# Patient Record
Sex: FEMALE | Race: WHITE | Employment: OTHER | URBAN - METROPOLITAN AREA
[De-identification: names, ages, dates, MRNs, and addresses within clinical notes are randomized per-mention and may not be internally consistent; named-entity substitution may affect disease eponyms.]

---

## 2017-02-06 ENCOUNTER — TELEPHONE (OUTPATIENT)
Dept: PHYSICAL THERAPY | Age: 72
End: 2017-02-06

## 2017-02-21 ENCOUNTER — OFFICE VISIT (OUTPATIENT)
Dept: INTERNAL MEDICINE CLINIC | Age: 72
End: 2017-02-21

## 2017-02-21 VITALS
HEART RATE: 68 BPM | HEIGHT: 62 IN | DIASTOLIC BLOOD PRESSURE: 64 MMHG | BODY MASS INDEX: 25.51 KG/M2 | SYSTOLIC BLOOD PRESSURE: 116 MMHG | WEIGHT: 138.6 LBS | RESPIRATION RATE: 14 BRPM

## 2017-02-21 DIAGNOSIS — E55.9 VITAMIN D DEFICIENCY: ICD-10-CM

## 2017-02-21 DIAGNOSIS — E78.2 MIXED HYPERLIPIDEMIA: ICD-10-CM

## 2017-02-21 DIAGNOSIS — N18.30 CHRONIC KIDNEY DISEASE, STAGE III (MODERATE) (HCC): ICD-10-CM

## 2017-02-21 DIAGNOSIS — R00.2 PALPITATIONS: ICD-10-CM

## 2017-02-21 DIAGNOSIS — M79.7 FIBROMYALGIA: ICD-10-CM

## 2017-02-21 DIAGNOSIS — I10 ESSENTIAL HYPERTENSION: ICD-10-CM

## 2017-02-21 DIAGNOSIS — I89.0 LYMPHEDEMA: ICD-10-CM

## 2017-02-21 DIAGNOSIS — I10 ESSENTIAL HYPERTENSION: Primary | ICD-10-CM

## 2017-02-21 LAB
A/G RATIO: 2.1 (ref 1.1–2.2)
ALBUMIN SERPL-MCNC: 4.4 G/DL (ref 3.4–5)
ALP BLD-CCNC: 86 U/L (ref 40–129)
ALT SERPL-CCNC: 19 U/L (ref 10–40)
ANION GAP SERPL CALCULATED.3IONS-SCNC: 16 MMOL/L (ref 3–16)
AST SERPL-CCNC: 23 U/L (ref 15–37)
BASOPHILS ABSOLUTE: 0.1 K/UL (ref 0–0.2)
BASOPHILS RELATIVE PERCENT: 0.7 %
BILIRUB SERPL-MCNC: 0.4 MG/DL (ref 0–1)
BUN BLDV-MCNC: 20 MG/DL (ref 7–20)
CALCIUM SERPL-MCNC: 9.2 MG/DL (ref 8.3–10.6)
CHLORIDE BLD-SCNC: 97 MMOL/L (ref 99–110)
CHOLESTEROL, TOTAL: 252 MG/DL (ref 0–199)
CO2: 28 MMOL/L (ref 21–32)
CREAT SERPL-MCNC: 1.5 MG/DL (ref 0.6–1.2)
EOSINOPHILS ABSOLUTE: 0.1 K/UL (ref 0–0.6)
EOSINOPHILS RELATIVE PERCENT: 1.1 %
GFR AFRICAN AMERICAN: 41
GFR NON-AFRICAN AMERICAN: 34
GLOBULIN: 2.1 G/DL
GLUCOSE BLD-MCNC: 137 MG/DL (ref 70–99)
HCT VFR BLD CALC: 41.4 % (ref 36–48)
HDLC SERPL-MCNC: 29 MG/DL (ref 40–60)
HEMOGLOBIN: 13.1 G/DL (ref 12–16)
LDL CHOLESTEROL CALCULATED: ABNORMAL MG/DL
LDL CHOLESTEROL DIRECT: 163 MG/DL
LYMPHOCYTES ABSOLUTE: 1.7 K/UL (ref 1–5.1)
LYMPHOCYTES RELATIVE PERCENT: 16.4 %
MCH RBC QN AUTO: 28.9 PG (ref 26–34)
MCHC RBC AUTO-ENTMCNC: 31.6 G/DL (ref 31–36)
MCV RBC AUTO: 91.5 FL (ref 80–100)
MONOCYTES ABSOLUTE: 0.7 K/UL (ref 0–1.3)
MONOCYTES RELATIVE PERCENT: 6.2 %
NEUTROPHILS ABSOLUTE: 8 K/UL (ref 1.7–7.7)
NEUTROPHILS RELATIVE PERCENT: 75.6 %
PDW BLD-RTO: 17.3 % (ref 12.4–15.4)
PLATELET # BLD: 200 K/UL (ref 135–450)
PMV BLD AUTO: 8.7 FL (ref 5–10.5)
POTASSIUM SERPL-SCNC: 4.3 MMOL/L (ref 3.5–5.1)
RBC # BLD: 4.52 M/UL (ref 4–5.2)
SODIUM BLD-SCNC: 141 MMOL/L (ref 136–145)
TOTAL PROTEIN: 6.5 G/DL (ref 6.4–8.2)
TRIGL SERPL-MCNC: 431 MG/DL (ref 0–150)
VLDLC SERPL CALC-MCNC: ABNORMAL MG/DL
WBC # BLD: 10.6 K/UL (ref 4–11)

## 2017-02-21 PROCEDURE — 99214 OFFICE O/P EST MOD 30 MIN: CPT | Performed by: INTERNAL MEDICINE

## 2017-02-21 PROCEDURE — 3288F FALL RISK ASSESSMENT DOCD: CPT | Performed by: INTERNAL MEDICINE

## 2017-02-22 LAB — VITAMIN D 25-HYDROXY: 63.9 NG/ML

## 2017-03-10 ENCOUNTER — TELEPHONE (OUTPATIENT)
Dept: INTERNAL MEDICINE CLINIC | Age: 72
End: 2017-03-10

## 2017-03-10 RX ORDER — CLOTRIMAZOLE AND BETAMETHASONE DIPROPIONATE 10; .64 MG/G; MG/G
CREAM TOPICAL
Qty: 15 G | Refills: 0 | Status: SHIPPED | OUTPATIENT
Start: 2017-03-10 | End: 2017-03-21

## 2017-06-12 ENCOUNTER — OFFICE VISIT (OUTPATIENT)
Dept: INTERNAL MEDICINE CLINIC | Age: 72
End: 2017-06-12

## 2017-06-12 VITALS
HEART RATE: 64 BPM | DIASTOLIC BLOOD PRESSURE: 80 MMHG | WEIGHT: 136.4 LBS | BODY MASS INDEX: 24.95 KG/M2 | RESPIRATION RATE: 16 BRPM | OXYGEN SATURATION: 98 % | SYSTOLIC BLOOD PRESSURE: 120 MMHG

## 2017-06-12 DIAGNOSIS — M75.81 ROTATOR CUFF TENDONITIS, RIGHT: ICD-10-CM

## 2017-06-12 DIAGNOSIS — M25.511 ACUTE PAIN OF RIGHT SHOULDER: Primary | ICD-10-CM

## 2017-06-12 PROCEDURE — G8510 SCR DEP NEG, NO PLAN REQD: HCPCS | Performed by: INTERNAL MEDICINE

## 2017-06-12 PROCEDURE — 99213 OFFICE O/P EST LOW 20 MIN: CPT | Performed by: INTERNAL MEDICINE

## 2017-06-12 PROCEDURE — 20610 DRAIN/INJ JOINT/BURSA W/O US: CPT | Performed by: INTERNAL MEDICINE

## 2017-06-12 RX ORDER — CLOBETASOL PROPIONATE 0.5 MG/G
CREAM TOPICAL
Qty: 60 G | Refills: 3 | Status: SHIPPED | OUTPATIENT
Start: 2017-06-12

## 2017-06-12 RX ORDER — METHYLPREDNISOLONE ACETATE 80 MG/ML
80 INJECTION, SUSPENSION INTRA-ARTICULAR; INTRALESIONAL; INTRAMUSCULAR; SOFT TISSUE ONCE
Status: COMPLETED | OUTPATIENT
Start: 2017-06-12 | End: 2017-06-12

## 2017-06-12 RX ADMIN — METHYLPREDNISOLONE ACETATE 80 MG: 80 INJECTION, SUSPENSION INTRA-ARTICULAR; INTRALESIONAL; INTRAMUSCULAR; SOFT TISSUE at 15:01

## 2017-06-12 ASSESSMENT — PATIENT HEALTH QUESTIONNAIRE - PHQ9
SUM OF ALL RESPONSES TO PHQ QUESTIONS 1-9: 0
SUM OF ALL RESPONSES TO PHQ9 QUESTIONS 1 & 2: 0
2. FEELING DOWN, DEPRESSED OR HOPELESS: 0
1. LITTLE INTEREST OR PLEASURE IN DOING THINGS: 0

## 2017-08-07 ENCOUNTER — OFFICE VISIT (OUTPATIENT)
Dept: INTERNAL MEDICINE CLINIC | Age: 72
End: 2017-08-07

## 2017-08-07 VITALS
DIASTOLIC BLOOD PRESSURE: 78 MMHG | BODY MASS INDEX: 24.84 KG/M2 | OXYGEN SATURATION: 98 % | HEART RATE: 80 BPM | WEIGHT: 135.8 LBS | RESPIRATION RATE: 16 BRPM | SYSTOLIC BLOOD PRESSURE: 120 MMHG

## 2017-08-07 DIAGNOSIS — R53.83 FATIGUE, UNSPECIFIED TYPE: ICD-10-CM

## 2017-08-07 DIAGNOSIS — M79.7 FIBROMYALGIA: ICD-10-CM

## 2017-08-07 DIAGNOSIS — M79.10 MYALGIA: Primary | ICD-10-CM

## 2017-08-07 LAB
A/G RATIO: 1.7 (ref 1.1–2.2)
ALBUMIN SERPL-MCNC: 4.2 G/DL (ref 3.4–5)
ALP BLD-CCNC: 104 U/L (ref 40–129)
ALT SERPL-CCNC: 12 U/L (ref 10–40)
ANION GAP SERPL CALCULATED.3IONS-SCNC: 14 MMOL/L (ref 3–16)
AST SERPL-CCNC: 14 U/L (ref 15–37)
BASOPHILS ABSOLUTE: 0.1 K/UL (ref 0–0.2)
BASOPHILS RELATIVE PERCENT: 0.8 %
BILIRUB SERPL-MCNC: 0.3 MG/DL (ref 0–1)
BUN BLDV-MCNC: 25 MG/DL (ref 7–20)
C-REACTIVE PROTEIN: 10.2 MG/L (ref 0–5.1)
CALCIUM SERPL-MCNC: 9.6 MG/DL (ref 8.3–10.6)
CHLORIDE BLD-SCNC: 97 MMOL/L (ref 99–110)
CO2: 31 MMOL/L (ref 21–32)
CREAT SERPL-MCNC: 1.6 MG/DL (ref 0.6–1.2)
EOSINOPHILS ABSOLUTE: 0.2 K/UL (ref 0–0.6)
EOSINOPHILS RELATIVE PERCENT: 1.4 %
GFR AFRICAN AMERICAN: 38
GFR NON-AFRICAN AMERICAN: 31
GLOBULIN: 2.5 G/DL
GLUCOSE BLD-MCNC: 115 MG/DL (ref 70–99)
HCT VFR BLD CALC: 42.2 % (ref 36–48)
HEMOGLOBIN: 13.6 G/DL (ref 12–16)
LYMPHOCYTES ABSOLUTE: 1.8 K/UL (ref 1–5.1)
LYMPHOCYTES RELATIVE PERCENT: 16.2 %
MCH RBC QN AUTO: 28.8 PG (ref 26–34)
MCHC RBC AUTO-ENTMCNC: 32.2 G/DL (ref 31–36)
MCV RBC AUTO: 89.6 FL (ref 80–100)
MONOCYTES ABSOLUTE: 0.6 K/UL (ref 0–1.3)
MONOCYTES RELATIVE PERCENT: 5.6 %
NEUTROPHILS ABSOLUTE: 8.5 K/UL (ref 1.7–7.7)
NEUTROPHILS RELATIVE PERCENT: 76 %
PDW BLD-RTO: 17.7 % (ref 12.4–15.4)
PLATELET # BLD: 238 K/UL (ref 135–450)
PMV BLD AUTO: 9 FL (ref 5–10.5)
POTASSIUM SERPL-SCNC: 4.7 MMOL/L (ref 3.5–5.1)
RBC # BLD: 4.71 M/UL (ref 4–5.2)
SODIUM BLD-SCNC: 142 MMOL/L (ref 136–145)
TOTAL CK: 49 U/L (ref 26–192)
TOTAL PROTEIN: 6.7 G/DL (ref 6.4–8.2)
WBC # BLD: 11.2 K/UL (ref 4–11)

## 2017-08-07 PROCEDURE — 99213 OFFICE O/P EST LOW 20 MIN: CPT | Performed by: INTERNAL MEDICINE

## 2017-08-07 PROCEDURE — 36415 COLL VENOUS BLD VENIPUNCTURE: CPT | Performed by: INTERNAL MEDICINE

## 2017-08-08 LAB
SEDIMENTATION RATE, ERYTHROCYTE: 28 MM/HR (ref 0–30)
TSH REFLEX: 3.52 UIU/ML (ref 0.27–4.2)

## 2017-08-22 ENCOUNTER — OFFICE VISIT (OUTPATIENT)
Dept: ORTHOPEDIC SURGERY | Age: 72
End: 2017-08-22

## 2017-08-22 VITALS — WEIGHT: 135 LBS | BODY MASS INDEX: 24.84 KG/M2 | HEIGHT: 62 IN | RESPIRATION RATE: 16 BRPM

## 2017-08-22 DIAGNOSIS — M75.121 COMPLETE TEAR OF RIGHT ROTATOR CUFF: Primary | ICD-10-CM

## 2017-08-22 DIAGNOSIS — R52 PAIN: ICD-10-CM

## 2017-08-22 PROCEDURE — 99204 OFFICE O/P NEW MOD 45 MIN: CPT | Performed by: ORTHOPAEDIC SURGERY

## 2017-08-22 ASSESSMENT — ENCOUNTER SYMPTOMS
RESPIRATORY NEGATIVE: 1
EYES NEGATIVE: 1
GASTROINTESTINAL NEGATIVE: 1

## 2017-09-13 RX ORDER — ATENOLOL 50 MG/1
25 TABLET ORAL DAILY
Qty: 15 TABLET | Refills: 0 | Status: SHIPPED | OUTPATIENT
Start: 2017-09-13 | End: 2017-10-24 | Stop reason: SDUPTHER

## 2017-09-13 RX ORDER — DIGOXIN 125 MCG
125 TABLET ORAL DAILY
Qty: 30 TABLET | Refills: 0 | Status: SHIPPED | OUTPATIENT
Start: 2017-09-13 | End: 2017-10-24 | Stop reason: SDUPTHER

## 2017-10-03 ENCOUNTER — OFFICE VISIT (OUTPATIENT)
Dept: ORTHOPEDIC SURGERY | Age: 72
End: 2017-10-03

## 2017-10-03 ENCOUNTER — TELEPHONE (OUTPATIENT)
Dept: ORTHOPEDIC SURGERY | Age: 72
End: 2017-10-03

## 2017-10-03 VITALS — BODY MASS INDEX: 24.84 KG/M2 | RESPIRATION RATE: 16 BRPM | WEIGHT: 135 LBS | HEIGHT: 62 IN

## 2017-10-03 DIAGNOSIS — M75.121 COMPLETE TEAR OF RIGHT ROTATOR CUFF: Primary | ICD-10-CM

## 2017-10-03 PROCEDURE — 99213 OFFICE O/P EST LOW 20 MIN: CPT | Performed by: ORTHOPAEDIC SURGERY

## 2017-10-03 RX ORDER — ALPRAZOLAM 0.5 MG/1
1.5 TABLET ORAL 3 TIMES DAILY
COMMUNITY
Start: 2017-09-19

## 2017-10-03 ASSESSMENT — ENCOUNTER SYMPTOMS
RESPIRATORY NEGATIVE: 1
GASTROINTESTINAL NEGATIVE: 1
EYES NEGATIVE: 1

## 2017-10-03 NOTE — PROGRESS NOTES
Scribe Authentication Statement  Sarwat Price, scribed portions of this documentation for and in the presence of Dr. Erik Mckeon on 10/3/17 at 1:56 PM.    Provider Jeannie Johnson M.D., personally performed the services described in this documentation and they were scribed in my presence by the above listed scribe. The documentation is both accurate and complete. Review of Systems   Constitutional: Negative. HENT: Negative. Eyes: Negative. Respiratory: Negative. Cardiovascular: Negative. Gastrointestinal: Negative. Genitourinary: Negative. Musculoskeletal: Negative. Skin: Negative. Neurological: Negative. Endo/Heme/Allergies: Negative. Psychiatric/Behavioral: Negative. HPI:  Ceci Vyas is a 75 yo female who is here to follow up on her right shoulder pain and MRI results. She is still having pain and decreased ROM. Review of previous history:    HPI:  Ceci Vyas is a 74y.o. year old female who complains of right shoulder (and occasional left) pain and crepitus sensation. Patient states she went to sleep a couple months ago and awoke with really bad shoulder pain and couldn't sleep. Pain seems to radiate down the arm to the elbow. Right shoulder pain > left. The Bilateral shoulder pain assessment is:   Intensity: 7/10   Location:        Global bilateral shoulders   Description:    aching    The symptoms started 2 months ago. Cause of injury was  No specific cause. Previous treatment for this episode has included Injection steroid right shoulder (06/12/17 Dr. Johnney Barthel) with mild improvement  And tylenol. The progression of symptoms, overall course: gradually worsening.      Prior to this episode, the history is: negative for prior surgery, trauma, arthritis or disorders    Referred by:  Dr. Oscar Patel for the evaluation of bilateral shoulder pain         Past Medical History:   Diagnosis Date    Anemia     Angioedema 4/7/2014    Anxiety 4/7/2014    APC (atrial premature contractions) 2/2012    Infrequent APC's-Dr Orestes Rios Arrhythmia 4/7/2014    Brain aneurysm      referred to [facility] Dr Stephani Ruiz    Chronic kidney disease     CKD (chronic kidney disease)     Dr Felecia Langford Coronary artery disease involving native coronary artery of native heart without angina pectoris 9/13/2016    Depression 4/7/2014    Depression with anxiety     Dr. Tank Smallwood managing xanax and cymbalta   [facility] Fibromyalgia     [facility]    GE reflux 2006    H/O cardiovascular stress test 2-3-12    2-3-12, EF 70 % normal study    H/O echocardiogram 2-3-12    2-3-12 EF 55%, normal test    Hemorrhoids     + colonoscopy 2011 Dr. Rosangela Jeffrey History of 24 hour EKG monitoring 2-7-12    48 hour holter, predonimant rhythm is SInus rhythum, Max  and min HR 64, infrequent PVC's and APC's.     Hx of cardiovascular stress test 6/12/2015    lexiscan-mild ischemia apical,EF70%    Hyperlipidemia     Hypertension     IBS (irritable bowel syndrome) 7/7/2013    Dr. Nichol Hackett, IBS diet, immodium prn (2013)    [facility] Internal hemorrhoids 2011    Dr. Lakisha Horta Interstitial cystitis     Dr. Bala Ngo with cath    MVP (mitral valve prolapse)     Palpitations     Following with  Dr. Valentin Epstein, and on digoxin    Pap smear for cervical cancer screening     Dr. Yasmin Rizo    [facility] Pulmonary nodule     9/14 CT chest 8mm nodule, no change, recheck 9/15    PVC (premature ventricular contraction) 2/2012    Infrequent PVC's-Dr. Trista Vincent    Right inguinal pain     referred from Dr. Redding to Dr. Malissa Riggins then PT ( no hernia)    Skin cancer of nose     Dr. Aimee Sandifer,  previously see Dr. Anup Lopez deficiency        Past Surgical History:   Procedure Laterality Date    ABDOMINAL EXPLORATION SURGERY  9/13    endometriosis    BLADDER SURGERY  2000, 2008    Dr. Bala Ngo- dilatation    Jonathan Vance, 1995    benign cysts    COLONOSCOPY  2011    Dr. Simeon Briones [facility] in [place];Grade 1 internal hemorrhoids    HYSTERECTOMY, TOTAL ABDOMINAL  1/14    endometriosis       Family History   Problem Relation Age of Onset    Mental Illness Son      age 32- on disability    Hypertension Mother     Diabetes Father     Heart Attack Father     Hypertension Father     Stroke Father     High Blood Pressure Father     Heart Surgery Brother     Hypertension Brother     Heart Disease Brother     Cancer Maternal Aunt     Cancer Maternal Uncle     Cancer Maternal Aunt        Social History     Social History    Marital status:      Spouse name: N/A    Number of children: N/A    Years of education: N/A     Social History Main Topics    Smoking status: Never Smoker    Smokeless tobacco: Never Used    Alcohol use No    Drug use: No    Sexual activity: No     Other Topics Concern    None     Social History Narrative        Exercise - none    Retired college counselor       Current Outpatient Prescriptions   Medication Sig Dispense Refill    ALPRAZolam (XANAX) 0.5 MG tablet       atenolol (TENORMIN) 50 MG tablet Take 0.5 tablets by mouth daily 15 tablet 0    digoxin (LANOXIN) 125 MCG tablet Take 1 tablet by mouth daily 30 tablet 0    L-Methylfolate-P97-M8-J7 (L-METHYL-MC) 6-1-50-5 MG TABS Take 1 tablet by mouth daily      clobetasol (TEMOVATE) 0.05 % cream Apply twice daily 60 g 3    triamterene-hydrochlorothiazide (MAXZIDE-25) 37.5-25 MG per tablet Take 1 tablet by mouth every other day 90 tablet 3    FOLIC ACID PO Take 1 tablet by mouth daily      digoxin (LANOXIN) 125 MCG tablet Take 1 tablet by mouth daily Alternate 1 and 1/2 tab 135 tablet 3    atenolol (TENORMIN) 50 MG tablet Take 0.5 tablets by mouth daily Dose decrease as of 4/15/2013 45 tablet 3    Cholecalciferol (VITAMIN D) 2000 UNITS CAPS capsule Take 2,000 Units by mouth daily 90 capsule 3    nortriptyline (PAMELOR) 10 MG/5ML solution Take 2 ml by mouth nightly (clarification as of 8/2/2012)- please give 30 day supply with 3 refills 1 Bottle 5    gabapentin (NEURONTIN) 250 MG/5ML solution Take 4 mL by mouth daily. (Patient taking differently: Take 3 mL by mouth daily.) 450 mL 3    vitamin B-12 (CYANOCOBALAMIN) 1000 MCG tablet Take 1,000 mcg by mouth daily.  Multiple Vitamin (MULTIVITAMIN PO) Take 1 tablet by mouth daily.  duloxetine (CYMBALTA) 60 MG capsule Take 1 capsule by mouth daily. Managed by Dr. Clara Almonte alprazolam (XANAX XR) 0.5 MG XR tablet Take 0.5 mg by mouth 4 times daily 1/2 tab (0.25mg)      ASPIRIN LOW DOSE PO Take 1 tablet by mouth daily. No current facility-administered medications for this visit. Allergies   Allergen Reactions    Zetia [Ezetimibe] Palpitations and Other (See Comments)     Heart palpitations    Statins Other (See Comments)     Muscles aches    Demerol Rash    Pcn [Penicillins] Rash    Sulfa Antibiotics Rash       Review of Systems:  See above      Physical Exam:   Resp 16  Ht 5' 2\" (1.575 m)  Wt 135 lb (61.2 kg)  BMI 24.69 kg/m2   She is in no acute distress, is awake, alert, oriented X 3, has appropriate mood and affect, and demonstrates normal gait and station. Right shoulder exam:  Skin:  Clear with no erythema, there is no significant joint effusion  Deformity:  none  Atrophy:  none  Tenderness:  none  Active ROM:   FE:170    IR side: T12           ER side: 30   ER abduction: 80   IR abduction:  +10  Passive ROM is the same as active  ROM in the left shoulder is the same  Strength: FE:5-/5     ER:5-/5 IR:5/5      Elbow flexion:  5/5  (Left shoulder with 5/5 strength)  Neer:  moderately positive  Delgado:  moderately positive  Yergason:  mildly positive      The cervical spine does not have tenderness to palpation and there is not a Spurling's sign.   Unless otherwise noted above there are no skin lesions, there is normal soft touch sensibility and 5/5 motor function in the C5-T1 distribution in the arm. There are bilateral +2 radial pulses and good capillary refill in the hands. Imaging review:  reviewed (2 views of the right shoulder, 2V of scapula) and show  Small osteophyte of the inferior glenoid, no fracture    reviewed  2 views of the left shoulder and 2 left scapula show no fracture, dislocation, swelling or degenerative changes noted                 Impression:   Right shoulder rotator cuff tear    Plan:  Natural history and expected course discussed. Questions answered.   Medical clearance  Full PAT  Surgery tentative for November 20th at [facility]  Return to the office after clearances received

## 2017-10-03 NOTE — MR AVS SNAPSHOT
L-Methylfolate-Z74-P8-Y3 (L-METHYL-MC) 6-1-50-5 MG TABS Take 1 tablet by mouth daily    clobetasol (TEMOVATE) 0.05 % cream Apply twice daily    triamterene-hydrochlorothiazide (MAXZIDE-25) 37.5-25 MG per tablet Take 1 tablet by mouth every other day    FOLIC ACID PO Take 1 tablet by mouth daily    atenolol (TENORMIN) 50 MG tablet Take 0.5 tablets by mouth daily Dose decrease as of 4/15/2013    Cholecalciferol (VITAMIN D) 2000 UNITS CAPS capsule Take 2,000 Units by mouth daily    nortriptyline (PAMELOR) 10 MG/5ML solution Take 2 ml by mouth nightly (clarification as of 8/2/2012)- please give 30 day supply with 3 refills    gabapentin (NEURONTIN) 250 MG/5ML solution Take 4 mL by mouth daily. vitamin B-12 (CYANOCOBALAMIN) 1000 MCG tablet Take 1,000 mcg by mouth daily. Multiple Vitamin (MULTIVITAMIN PO) Take 1 tablet by mouth daily. duloxetine (CYMBALTA) 60 MG capsule Take 1 capsule by mouth daily. Managed by Dr. Sho Stone    alprazolam (XANAX XR) 0.5 MG XR tablet Take 0.5 mg by mouth 4 times daily 1/2 tab (0.25mg)    ASPIRIN LOW DOSE PO Take 1 tablet by mouth daily.     digoxin (LANOXIN) 125 MCG tablet Take 1 tablet by mouth daily Alternate 1 and 1/2 tab      Allergies              Zetia [Ezetimibe] Palpitations, Other (See Comments)    Heart palpitations    Statins Other (See Comments)    Muscles aches    Demerol Rash    Pcn [Penicillins] Rash    Sulfa Antibiotics Rash         Additional Information        Basic Information     Date Of Birth Sex Race Ethnicity Preferred Language    1945 Female White Non-/Non  English      Problem List as of 10/3/2017  Date Reviewed: 8/18/2017                HTN (hypertension)    Palpitations    Fibromyalgia    Anemia    Depression with anxiety (Chronic)    Rash    Fatigue    Insomnia    Vitamin D deficiency    Lymphedema    Chronic kidney disease, stage III (moderate)    Essential hypertension Coronary artery disease involving native coronary artery of native heart without angina pectoris    Anxiety    Mixed hyperlipidemia    CKD (chronic kidney disease) (Chronic)    Arrhythmia    Depression    Anxiety    Interstitial cystitis    Angioedema    IBS (irritable bowel syndrome)      Immunizations as of 10/3/2017     Name Date    Influenza, High Dose 10/3/2016, 11/19/2015    Pneumococcal 13-valent Conjugate ([id number]) 11/19/2015    Pneumococcal Polysaccharide ([id number]) 12/20/2011      Preventive Care        Date Due    Tetanus Combination Vaccine (1 - Tdap) 7/1/1962    Zoster Vaccine 7/1/2003    Yearly Flu Vaccine (1) 9/1/2017    Mammograms are recommended every 2 years for low/average risk patients aged 48 - 69, and every year for high risk patients per updated national guidelines. However these guidelines can be individualized by your provider. 12/17/2017    Colonoscopy 12/7/2021    Cholesterol Screening 2/21/2022            MyChart Signup           Our records indicate that you have declined MyChart signup.

## 2017-10-03 NOTE — PATIENT INSTRUCTIONS
Plan:  Natural history and expected course discussed. Questions answered.   Medical clearance  Full PAT  Surgery tentative for November 20th at [facility]  Return to the office after clearances received

## 2017-10-19 NOTE — TELEPHONE ENCOUNTER
Patient has not been in the office in >1 year and requests refills of Dig and Atenolol. She has 4 surgeries scheduled and is unable to come in to the office. Advised that I will discuss with Dr. Mary Vaca 10/24/17 ad advise. She voiced understanding.

## 2017-10-25 RX ORDER — DIGOXIN 125 MCG
125 TABLET ORAL DAILY
Qty: 90 TABLET | Refills: 0 | Status: SHIPPED | OUTPATIENT
Start: 2017-10-25 | End: 2018-03-06 | Stop reason: SDUPTHER

## 2017-10-25 RX ORDER — ATENOLOL 50 MG/1
25 TABLET ORAL DAILY
Qty: 45 TABLET | Refills: 0 | Status: SHIPPED | OUTPATIENT
Start: 2017-10-25 | End: 2017-11-13

## 2017-10-31 NOTE — TELEPHONE ENCOUNTER
Informed patient of the date change for suregry Patient informed me that she has kidney disease as well and that her Nephrologist wants to be informed of any surgeries. So medical risk assessment form was sent to Dr. Corazon Dwyer for clearance.

## 2017-11-01 ENCOUNTER — HOSPITAL ENCOUNTER (OUTPATIENT)
Dept: OTHER | Age: 72
Discharge: HOME | End: 2017-11-01
Attending: INTERNAL MEDICINE | Admitting: INTERNAL MEDICINE

## 2017-11-02 ENCOUNTER — TELEPHONE (OUTPATIENT)
Dept: ORTHOPEDIC SURGERY | Age: 72
End: 2017-11-02

## 2017-11-09 ENCOUNTER — OFFICE VISIT (OUTPATIENT)
Dept: ORTHOPEDIC SURGERY | Age: 72
End: 2017-11-09

## 2017-11-09 ENCOUNTER — TELEPHONE (OUTPATIENT)
Dept: ORTHOPEDIC SURGERY | Age: 72
End: 2017-11-09

## 2017-11-09 VITALS — WEIGHT: 135 LBS | HEIGHT: 62 IN | RESPIRATION RATE: 16 BRPM | BODY MASS INDEX: 24.84 KG/M2

## 2017-11-09 DIAGNOSIS — M75.121 COMPLETE TEAR OF RIGHT ROTATOR CUFF: ICD-10-CM

## 2017-11-09 DIAGNOSIS — Z01.818 PREOP EXAMINATION: Primary | ICD-10-CM

## 2017-11-09 PROCEDURE — 99213 OFFICE O/P EST LOW 20 MIN: CPT | Performed by: ORTHOPAEDIC SURGERY

## 2017-11-09 RX ORDER — OFLOXACIN 3 MG/ML
SOLUTION/ DROPS OPHTHALMIC
COMMUNITY
Start: 2017-10-16 | End: 2017-11-13

## 2017-11-09 RX ORDER — PREDNISOLONE ACETATE 10 MG/ML
SUSPENSION/ DROPS OPHTHALMIC
COMMUNITY
Start: 2017-10-16 | End: 2017-11-13

## 2017-11-09 RX ORDER — KETOCONAZOLE 20 MG/G
CREAM TOPICAL
COMMUNITY
Start: 2017-10-18 | End: 2017-11-13

## 2017-11-09 RX ORDER — FLUCONAZOLE 200 MG/1
TABLET ORAL
COMMUNITY
Start: 2017-10-16 | End: 2017-11-13

## 2017-11-09 ASSESSMENT — ENCOUNTER SYMPTOMS
GASTROINTESTINAL NEGATIVE: 1
EYES NEGATIVE: 1
RESPIRATORY NEGATIVE: 1

## 2017-11-09 NOTE — PROGRESS NOTES
arthritis or disorders    Referred by:  Dr. Teddy Ordonez for the evaluation of bilateral shoulder pain         Past Medical History:   Diagnosis Date    Anemia     Angioedema 4/7/2014    Anxiety 4/7/2014    APC (atrial premature contractions) 2/2012    Infrequent APC's-Dr Cyrus Jones Arrhythmia 4/7/2014    Brain aneurysm      referred to [facility] Chronic kidney disease     CKD (chronic kidney disease)     Dr Geri Bocanegra Coronary artery disease involving native coronary artery of native heart without angina pectoris 9/13/2016    Depression 4/7/2014    Depression with anxiety     Dr. Dilip Wood managing xanax and cymbalta   [facility] Du[facility] [address]    GE reflux 2006    H/O cardiovascular stress test 2-3-12    2-3-12, EF 70 % normal study    H/O echocardiogram 2-3-12    2-3-12 EF 55%, normal test    Hemorrhoids     + colonoscopy 2011 Dr. Raul Sarmiento History of 24 hour EKG monitoring 2-7-12    48 hour holter, predonimant rhythm is SInus rhythum, Max  and min HR 64, infrequent PVC's and APC's.     Hx of cardiovascular stress test 6/12/2015    lexiscan-mild ischemia apical,EF70%    Hyperlipidemia     Hypertension     IBS (irritable bowel syndrome) 7/7/2013    Dr. Qi See, IBS diet, immodium prn (2013)    [facility] Internal hemorrhoids 2011    Dr. Malgorzata Cross Interstitial cystitis     Dr. Alex Cancino with cath    MVP (mitral valve prolapse)     Palpitations     Following with  Dr. Mata Ek, and on digoxin    Pap smear for cervical cancer screening     Dr. Bobbetta Ngo [facility] Pulmonary nodule     9/14 CT chest 8mm nodule, no change, recheck 9/15    PVC (premature ventricular contraction) 2/2012    Infrequent PVC's-Dr. Lou Rees    Right inguinal pain     referred from Dr. Nathalie Armenta to Dr. Carolina Workman then PT ( no hernia)    Skin cancer of nose     Dr. Renu Marques,  previously see Dr. Hugo gallo        Past Surgical History:   Procedure Laterality Date  ABDOMINAL EXPLORATION SURGERY  9/13    endometriosis    BLADDER SURGERY  2000, 2008    Dr. Leo Lockhart- dilatation    Karan Weaver    benign cysts   Candie Leary  2011    Dr. Stacey Garland - [facility] in [place];Grade 1 internal hemorrhoids    HYSTERECTOMY, TOTAL ABDOMINAL  1/14    endometriosis       Family History   Problem Relation Age of Onset    Mental Illness Son      age 32- on disability    Hypertension Mother     Diabetes Father     Heart Attack Father     Hypertension Father     Stroke Father     High Blood Pressure Father     Heart Surgery Brother     Hypertension Brother     Heart Disease Brother     Cancer Maternal Aunt     Cancer Maternal Uncle     Cancer Maternal Aunt        Social History     Social History    Marital status:      Spouse name: N/A    Number of children: N/A    Years of education: N/A     Social History Main Topics    Smoking status: Never Smoker    Smokeless tobacco: Never Used    Alcohol use No    Drug use: No    Sexual activity: No     Other Topics Concern    None     Social History Narrative        Exercise - none    Retired college counselor       Current Outpatient Prescriptions   Medication Sig Dispense Refill    fluconazole (DIFLUCAN) 200 MG tablet       ketoconazole (NIZORAL) 2 % cream       ofloxacin (OCUFLOX) 0.3 % solution       prednisoLONE acetate (PRED FORTE) 1 % ophthalmic suspension       atenolol (TENORMIN) 50 MG tablet Take 0.5 tablets by mouth daily 45 tablet 0    digoxin (LANOXIN) 125 MCG tablet Take 1 tablet by mouth daily 90 tablet 0    ALPRAZolam (XANAX) 0.5 MG tablet       L-Methylfolate-R47-A4-R4 (L-METHYL-MC) 6-1-50-5 MG TABS Take 1 tablet by mouth daily      clobetasol (TEMOVATE) 0.05 % cream Apply twice daily 60 g 3    triamterene-hydrochlorothiazide (MAXZIDE-25) 37.5-25 MG per tablet Take 1 tablet by mouth every other day 90 tablet 3    FOLIC ACID PO Take 1 tablet by mouth daily      digoxin Elbow flexion:  5/5  Neer:  moderately positive  Delgado:  moderately positive  Yergason:  mildly positive      The cervical spine does not have tenderness to palpation and there is not a Spurling's sign. Unless otherwise noted above there are no skin lesions, there is normal soft touch sensibility and 5/5 motor function in the C5-T1 distribution in the arm. There are bilateral +2 radial pulses and good capillary refill in the hands. Imaging review:  reviewed (2 views of the right shoulder, 2V of scapula) and show  Small osteophyte of the inferior glenoid, no fracture    reviewed  2 views of the left shoulder and 2 left scapula show no fracture, dislocation, swelling or degenerative changes noted                  Impression:   Right shoulder rotator cuff tear (high grade partial thickness)    Plan:  Natural history and expected course discussed. Questions answered. She would like to proceed with surgical treatment   Medical clearance reviewed  Clearance sent to Dr. Des Beckman  No med list given  Surgical date to change, patient wants to be admitted for pain control/social issues  Patient and surgery scheduler to work out a date    After discussing continued non-operative vs operative treatment, I recommended right shoulder arthroscopic surgery. She desires to proceed. The planned procedure/risks/benefits/alternatives were discussed. Plan is for right shoulder diagnostic arthroscopy then treatment per findings (most likely rotator cuff repair), partially open/open procedure if needed. The risks include, but are not limited to, damage to blood vessels and nerves, infection, persistent pain and/or symptoms, stiffness, blood clots, need for further surgery, anesthesia complications and other. She understands the above and desires to proceed and all questions were answered.

## 2017-11-09 NOTE — ANESTHESIA PRE-OP
endometriosis    BLADDER SURGERY  2000, 2008    Dr. Lindy Spence- brenda Ash    benign cysts   Rommel Perez  2011    Dr. Erinn Escalante - [facility] in [place];Grade 1 internal hemorrhoids    HYSTERECTOMY, TOTAL ABDOMINAL  1/14    endometriosis        Current Medications:   Not in a hospital admission. Allergies: Allergies   Allergen Reactions    Zetia [Ezetimibe] Palpitations and Other (See Comments)     Heart palpitations    Statins Other (See Comments)     Muscles aches    Demerol Rash    Pcn [Penicillins] Rash    Sulfa Antibiotics Rash             Social History:  Social History     Social History    Marital status:      Spouse name: N/A    Number of children: N/A    Years of education: N/A     Occupational History    Not on file. Social History Main Topics    Smoking status: Never Smoker    Smokeless tobacco: Never Used    Alcohol use No    Drug use: No    Sexual activity: No     Other Topics Concern    Not on file     Social History Narrative        Exercise - none    Retired college counselor        Recent Vitals:  Vitals:    11/13/17 1107   BP: 127/69   Pulse: 72   Resp: 16   Temp: 97.2 °F (36.2 °C)   SpO2: 99%     Wt Readings from Last 3 Encounters:   11/09/17 135 lb (61.2 kg)   10/03/17 135 lb (61.2 kg)   08/22/17 135 lb (61.2 kg)      Ht Readings from Last 3 Encounters:   11/09/17 5' 2\" (1.575 m)   10/03/17 5' 2\" (1.575 m)   08/22/17 5' 2\" (1.575 m)     There is no height or weight on file to calculate BMI.     Wt Readings from Last 3 Encounters:   11/09/17 135 lb (61.2 kg)   10/03/17 135 lb (61.2 kg)   08/22/17 135 lb (61.2 kg)       Present on Admission:  **None**       Anesthesia Alerts:  No      Malignant Hyperthermia:  no    History of Sleep Apnea:   No    REVIEW OF SYSTEMS:      EYES: wears glasses    HEENT: neg    RESPIRATORY: pulmonary nodule, 2014, monitored   Smoker: NO  Able to lie flat: yes     CARDIOVASCULAR: HTN,HLD, CAD, VHD, hx PVC and palpitations, placed on digoxin and beta blocker, resolved. [address] [facility] 2015 for + stress  The left main coronary artery has no significant disease. Left anterior descending artery has  Mild proximal disease. Circumflex artery has no significant disease. The right coronary artery is a dominant vessel with no significant disease  Left ventriculogram shows ejection fraction of 55%. Normal wall motion. Impression:  MILD  LAD  DISEASE    GASTROINTESTINAL:  Reflux, IBS, hemorrhoids. Followed by Dr. Caity Delaney. GENITOURINARY: CKD  Stage III. Elevated BUN and CR baseline 1.4-1.6. Followed by Dr James Parson. Interstitial cystitis, bladder dilatation 2000, 2008 per Dr. Junior Mon INTEGUMENT:neg    BREAST/GYN:  Exp lap lysis of adhesion, s/p hysterectomy, 2014. HEMATOLOGIC/LYMPHATIC: anemia, resolved. Vit D def. ENDOCRINE:  Neg     MUSCULOSKELETAL: right rotator tear    NEUROLOGICAL:  Fibromyalgia, stefan aneursym     BEHAVIOR/PSYCH:  Depression, anxiety. Alert and oriented x 4. Questions answered. Understanding verbalized    PHYSICAL EXAM:  Airway Exam:  Head/Neck movement: full  Thyromental Distance: >3 FB  History of difficult intubation:  None  Mallampati Classification:  Class II  Teeth: upper partial. Missing lower molars.        LUNGS:  No increased work of breathing, good air exchange, clear to auscultation bilaterally, no crackles or wheezing  CARDIOVASCULAR:  Normal apical impulse, regular rate and rhythm, normal S1 and S2, no S3 or S4, and no murmur noted    Testing Results:    EKG:  Sinus rhythm with marked sinus arrhythmia   Nonspecific T wave abnormality   LABS:      CBC  Lab Results   Component Value Date/Time    WBC 8.6 11/02/2017 10:06 AM    HGB 13.3 11/02/2017 10:06 AM    HCT 41.0 11/02/2017 10:06 AM     11/02/2017 10:06 AM     RENAL  Lab Results   Component Value Date/Time     11/13/2017 08:00 AM    K 4.0 11/13/2017 08:00 AM    CL 97 (L) 11/13/2017 08:00 AM    CO2 33 (H) 11/13/2017

## 2017-11-13 ENCOUNTER — TELEPHONE (OUTPATIENT)
Dept: CARDIOLOGY CLINIC | Age: 72
End: 2017-11-13

## 2017-11-13 ENCOUNTER — HOSPITAL ENCOUNTER (OUTPATIENT)
Dept: PREADMISSION TESTING | Age: 72
Discharge: HOME | End: 2017-11-13
Attending: ORTHOPAEDIC SURGERY | Admitting: ORTHOPAEDIC SURGERY

## 2017-11-13 VITALS
SYSTOLIC BLOOD PRESSURE: 127 MMHG | WEIGHT: 136.5 LBS | BODY MASS INDEX: 25.77 KG/M2 | HEIGHT: 61 IN | HEART RATE: 72 BPM | RESPIRATION RATE: 16 BRPM | OXYGEN SATURATION: 99 % | DIASTOLIC BLOOD PRESSURE: 69 MMHG | TEMPERATURE: 97.2 F

## 2017-11-13 LAB
ANION GAP SERPL CALCULATED.3IONS-SCNC: 11 MMOL/L (ref 4–16)
BUN BLDV-MCNC: 20 MG/DL (ref 6–23)
CALCIUM SERPL-MCNC: 9.4 MG/DL (ref 8.3–10.6)
CHLORIDE BLD-SCNC: 97 MMOL/L (ref 99–110)
CO2: 33 MMOL/L (ref 21–32)
CREAT SERPL-MCNC: 1.5 MG/DL (ref 0.6–1.1)
EKG ATRIAL RATE: 65 BPM
EKG DIAGNOSIS: NORMAL
EKG P AXIS: 52 DEGREES
EKG P-R INTERVAL: 152 MS
EKG Q-T INTERVAL: 382 MS
EKG QRS DURATION: 74 MS
EKG QTC CALCULATION (BAZETT): 397 MS
EKG R AXIS: 31 DEGREES
EKG T AXIS: -45 DEGREES
EKG VENTRICULAR RATE: 65 BPM
GFR AFRICAN AMERICAN: 41 ML/MIN/1.73M2
GFR NON-AFRICAN AMERICAN: 34 ML/MIN/1.73M2
GLUCOSE BLD-MCNC: 80 MG/DL (ref 70–140)
POTASSIUM SERPL-SCNC: 4 MMOL/L (ref 3.5–5.1)
SODIUM BLD-SCNC: 141 MMOL/L (ref 135–145)

## 2017-11-13 RX ORDER — ASPIRIN 81 MG/1
81 TABLET ORAL EVERY MORNING
COMMUNITY

## 2017-11-15 ENCOUNTER — TELEPHONE (OUTPATIENT)
Dept: ORTHOPEDIC SURGERY | Age: 72
End: 2017-11-15

## 2017-11-15 ENCOUNTER — OFFICE VISIT (OUTPATIENT)
Dept: CARDIOLOGY CLINIC | Age: 72
End: 2017-11-15

## 2017-11-15 VITALS
BODY MASS INDEX: 24.84 KG/M2 | DIASTOLIC BLOOD PRESSURE: 64 MMHG | HEART RATE: 76 BPM | WEIGHT: 135 LBS | HEIGHT: 62 IN | SYSTOLIC BLOOD PRESSURE: 118 MMHG

## 2017-11-15 DIAGNOSIS — Z01.818 PREOP EXAMINATION: ICD-10-CM

## 2017-11-15 DIAGNOSIS — M75.121 COMPLETE TEAR OF RIGHT ROTATOR CUFF: Primary | ICD-10-CM

## 2017-11-15 DIAGNOSIS — I10 ESSENTIAL HYPERTENSION: ICD-10-CM

## 2017-11-15 DIAGNOSIS — R00.2 PALPITATIONS: Primary | ICD-10-CM

## 2017-11-15 PROCEDURE — 99213 OFFICE O/P EST LOW 20 MIN: CPT | Performed by: INTERNAL MEDICINE

## 2017-11-15 NOTE — PROGRESS NOTES
every evening Managed by Dr. Jj Urban       No current facility-administered medications for this visit. Allergies: Demerol; Pcn [penicillins]; Statins; Sulfa antibiotics; Vicodin [hydrocodone-acetaminophen]; and Zetia [ezetimibe]  Past Medical History:   Diagnosis Date    Anemia     Angioedema 4/7/2014    APC (atrial premature contractions) 2/2012    Infrequent APC's-Dr Jose Woods Arrhythmia 4/7/2014    Brain aneurysm      [facility] to [facility] Dr Rogerio Pérez    CKD (chronic kidney disease)     Sees Dr. Gloria Pisano Depression with anxiety 2014    Dr. Jj Urban managing xanax and cymbalta    Difficulty swallowing     \"Large Pills\"   [address] [place]    GE reflux 2006    GERD (gastroesophageal reflux disease)     H/O cardiovascular stress test 2-3-12    2-3-12, EF 70 % normal study    H/O echocardiogram 2-3-12    2-3-12 EF 55%, normal test    Hemorrhoids     + colonoscopy 2011 Dr. Yobany Morejon History of 24 hour EKG monitoring 2-7-12    48 hour holter, predonimant rhythm is SInus rhythum, Max  and min HR 64, infrequent PVC's and APC's.     [place] (hard of hearing)     Bilateral Hearing Aids    Hx of cardiovascular stress test 6/12/2015    lexiscan-mild ischemia apical,EF70%    Hyperlipidemia     Hypertension     IBS (irritable bowel syndrome) 7/7/2013    Dr. Cira Morgan, IBS diet, immodium prn (2013)    [facility] Internal hemorrhoids 2011    Dr. Elie Durant Interstitial cystitis     Dr. Isidra Lemus with cath    MVP (mitral valve prolapse)     Palpitations     Following with  Dr. Peoples Im, and on digoxin    Pap smear for cervical cancer screening     Dr. Mikki Melchor    [facility] Pulmonary nodule     9/14 CT chest 8mm nodule, no change, recheck 9/15    PVC (premature ventricular contraction) 2/2012    Infrequent PVC's-Dr. Xin Ramírez    Right inguinal pain     referred from Dr. Cristina Hutson to Dr. Dorathy Epley then PT ( no hernia)    Shortness of breath on exertion     Skin cancer of nose In Past    Dr. Asiya Alfaro,  previously see Dr. Gomez Signs Teeth missing     Upper And Lower    Vitamin D deficiency     Wears glasses     Wears partial dentures     Upper     Past Surgical History:   Procedure Laterality Date    BLADDER SURGERY  2000, 2008    Dr. Rosendo Wright- dilatation    Debara Neighbor  2011    Dr. Ruth Manrique [facility] in [place];Grade 1 internal hemorrhoids    DENTAL SURGERY      Teeth Extracted In Past    ENDOSCOPY, COLON, DIAGNOSTIC  In Past    EYE SURGERY Left 11/2017    Cataract With Lens Implant    HYSTERECTOMY, TOTAL ABDOMINAL  1/14    SKIN CANCER EXCISION  In Past    Nose    TONSILLECTOMY  1960's     Family History   Problem Relation Age of Onset    Mental Illness Son     Arthritis Mother     Hearing Loss Mother     Heart Disease Mother      \"Aneurysm In [place]"    Stroke Father     Heart Attack Father     Heart Disease Father      Heart Attack    Diabetes Father     Other Brother      \"Colon Polyps\"    Heart Disease Brother      \"Heart Surgery\"     Social History   Substance Use Topics    Smoking status: Never Smoker    Smokeless tobacco: Never Used    Alcohol use No          Review of systems:  HEENT: Neg  Card:neg   GI;Neg  : Neg  Neuro: Neg  Psych: Neg  Derm: Neg  MS; Neg  All: Documented  Constitutional: Neg    Objective:      Physical Exam:  /64   Pulse 76   Ht 5' 1.5\" (1.562 m)   Wt 135 lb (61.2 kg)   BMI 25.09 kg/m²   Wt Readings from Last 3 Encounters:   11/15/17 135 lb (61.2 kg)   11/13/17 136 lb 8 oz (61.9 kg)   11/09/17 135 lb (61.2 kg)     Body mass index is 25.09 kg/m². GENERAL - Alert, oriented, pleasant, in no apparent distress. Head unremarkable  Eyes  Not injected conjunctiva  ENT  normal mucosa  Neck - Supple. No jugular venous distention noted. No carotid bruits. Cardiovascular  Normal S1 and S2 without obvious murmur or gallop.     Extremities - No cyanosis, clubbing, or significant

## 2017-11-15 NOTE — TELEPHONE ENCOUNTER
Patient called to report she found some one from [employer] who will stay with her as long as she needs after surgery. Pt states she is going to see Kristopher Agarwal today at 3pm for Cardiac clearance.

## 2017-11-17 ENCOUNTER — HOSPITAL ENCOUNTER (OUTPATIENT)
Dept: SURGERY | Age: 72
Discharge: HOME | End: 2017-11-17
Attending: ORTHOPAEDIC SURGERY | Admitting: ORTHOPAEDIC SURGERY

## 2017-11-17 ENCOUNTER — TELEPHONE (OUTPATIENT)
Dept: ORTHOPEDIC SURGERY | Age: 72
End: 2017-11-17

## 2017-11-17 VITALS
SYSTOLIC BLOOD PRESSURE: 109 MMHG | DIASTOLIC BLOOD PRESSURE: 55 MMHG | WEIGHT: 135.6 LBS | OXYGEN SATURATION: 99 % | HEIGHT: 62 IN | RESPIRATION RATE: 18 BRPM | TEMPERATURE: 97.5 F | BODY MASS INDEX: 24.95 KG/M2 | HEART RATE: 54 BPM

## 2017-11-17 PROCEDURE — 29824 SHO ARTHRS SRG DSTL CLAVICLC: CPT | Performed by: ORTHOPAEDIC SURGERY

## 2017-11-17 PROCEDURE — 29827 SHO ARTHRS SRG RT8TR CUF RPR: CPT | Performed by: ORTHOPAEDIC SURGERY

## 2017-11-17 PROCEDURE — 29826 SHO ARTHRS SRG DECOMPRESSION: CPT | Performed by: ORTHOPAEDIC SURGERY

## 2017-11-17 PROCEDURE — 29823 SHO ARTHRS SRG XTNSV DBRDMT: CPT | Performed by: ORTHOPAEDIC SURGERY

## 2017-11-17 RX ORDER — OXYCODONE HYDROCHLORIDE AND ACETAMINOPHEN 5; 325 MG/1; MG/1
1 TABLET ORAL
Status: ACTIVE | OUTPATIENT
Start: 2017-11-17 | End: 2017-11-17

## 2017-11-17 RX ORDER — ONDANSETRON 2 MG/ML
4 INJECTION INTRAMUSCULAR; INTRAVENOUS
Status: ACTIVE | OUTPATIENT
Start: 2017-11-17 | End: 2017-11-17

## 2017-11-17 RX ORDER — OXYCODONE HYDROCHLORIDE AND ACETAMINOPHEN 5; 325 MG/1; MG/1
1 TABLET ORAL EVERY 4 HOURS PRN
Qty: 35 TABLET | Refills: 0 | Status: ON HOLD | OUTPATIENT
Start: 2017-11-17 | End: 2017-11-22 | Stop reason: HOSPADM

## 2017-11-17 RX ORDER — SODIUM CHLORIDE 0.9 % (FLUSH) 0.9 %
10 SYRINGE (ML) INJECTION EVERY 12 HOURS SCHEDULED
Status: DISCONTINUED | OUTPATIENT
Start: 2017-11-17 | End: 2017-11-18 | Stop reason: HOSPADM

## 2017-11-17 RX ORDER — SODIUM CHLORIDE 0.9 % (FLUSH) 0.9 %
10 SYRINGE (ML) INJECTION PRN
Status: DISCONTINUED | OUTPATIENT
Start: 2017-11-17 | End: 2017-11-18 | Stop reason: HOSPADM

## 2017-11-17 RX ORDER — FENTANYL CITRATE 50 UG/ML
25 INJECTION, SOLUTION INTRAMUSCULAR; INTRAVENOUS EVERY 5 MIN PRN
Status: DISCONTINUED | OUTPATIENT
Start: 2017-11-17 | End: 2017-11-18 | Stop reason: HOSPADM

## 2017-11-17 RX ORDER — SODIUM CHLORIDE, SODIUM LACTATE, POTASSIUM CHLORIDE, CALCIUM CHLORIDE 600; 310; 30; 20 MG/100ML; MG/100ML; MG/100ML; MG/100ML
INJECTION, SOLUTION INTRAVENOUS CONTINUOUS
Status: DISCONTINUED | OUTPATIENT
Start: 2017-11-17 | End: 2017-11-18 | Stop reason: HOSPADM

## 2017-11-17 RX ORDER — ONDANSETRON 2 MG/ML
4 INJECTION INTRAMUSCULAR; INTRAVENOUS EVERY 6 HOURS PRN
Status: DISCONTINUED | OUTPATIENT
Start: 2017-11-17 | End: 2017-11-18 | Stop reason: HOSPADM

## 2017-11-17 RX ORDER — ONDANSETRON 4 MG/1
4 TABLET, FILM COATED ORAL EVERY 8 HOURS PRN
Qty: 5 TABLET | Refills: 0 | Status: ON HOLD | OUTPATIENT
Start: 2017-11-17 | End: 2017-11-22 | Stop reason: HOSPADM

## 2017-11-17 RX ORDER — OXYCODONE HYDROCHLORIDE AND ACETAMINOPHEN 5; 325 MG/1; MG/1
1 TABLET ORAL EVERY 4 HOURS PRN
Status: DISCONTINUED | OUTPATIENT
Start: 2017-11-17 | End: 2017-11-18 | Stop reason: HOSPADM

## 2017-11-17 RX ORDER — MORPHINE SULFATE 2 MG/ML
2 INJECTION, SOLUTION INTRAMUSCULAR; INTRAVENOUS EVERY 5 MIN PRN
Status: DISCONTINUED | OUTPATIENT
Start: 2017-11-17 | End: 2017-11-18 | Stop reason: HOSPADM

## 2017-11-17 RX ORDER — OXYCODONE HYDROCHLORIDE AND ACETAMINOPHEN 5; 325 MG/1; MG/1
2 TABLET ORAL EVERY 4 HOURS PRN
Status: DISCONTINUED | OUTPATIENT
Start: 2017-11-17 | End: 2017-11-18 | Stop reason: HOSPADM

## 2017-11-17 RX ORDER — DEXAMETHASONE SODIUM PHOSPHATE 4 MG/ML
4 INJECTION, SOLUTION INTRA-ARTICULAR; INTRALESIONAL; INTRAMUSCULAR; INTRAVENOUS; SOFT TISSUE
Status: ACTIVE | OUTPATIENT
Start: 2017-11-17 | End: 2017-11-17

## 2017-11-17 RX ORDER — LABETALOL HYDROCHLORIDE 5 MG/ML
5 INJECTION, SOLUTION INTRAVENOUS EVERY 10 MIN PRN
Status: DISCONTINUED | OUTPATIENT
Start: 2017-11-17 | End: 2017-11-18 | Stop reason: HOSPADM

## 2017-11-17 RX ADMIN — SODIUM CHLORIDE, SODIUM LACTATE, POTASSIUM CHLORIDE, CALCIUM CHLORIDE: 600; 310; 30; 20 INJECTION, SOLUTION INTRAVENOUS at 11:06

## 2017-11-17 ASSESSMENT — ENCOUNTER SYMPTOMS: SHORTNESS OF BREATH: 1

## 2017-11-17 ASSESSMENT — PAIN SCALES - GENERAL
PAINLEVEL_OUTOF10: 0

## 2017-11-17 ASSESSMENT — PAIN DESCRIPTION - PAIN TYPE: TYPE: ACUTE PAIN

## 2017-11-17 ASSESSMENT — PAIN - FUNCTIONAL ASSESSMENT: PAIN_FUNCTIONAL_ASSESSMENT: 0-10

## 2017-11-17 NOTE — PROGRESS NOTES
1501 Patient transferred from OR to [facility] via cart, she is resting quietly and denies needs at this time. Patient's dressing is dry and intact, ice machine placed on right shoulder, capillary refill is less than two seconds and radial pulse is palpable. 1505 Patient denies pain. 1515 Patient given ice chips. Patient has some movement in fingers in right hand. [facility]. 1600 Patient is sitting up drinking a shara mist and eating shayne crackers. 1645 Patient up to bathroom via wheelchair to void. 1700 Patient voided clear yellow urine. Helped patient dressed. [id number] Patient is complaining of being sob , lungs are clear and diminished,02 sat 100 on room air. Dressing is dry and intact vital signs are stable. 1720 Patient states \"she wasn't sob, but felt like she had a hard time taking a breath. Explained to patient due to nerve block that sometimes  You get that feeling. \"4836  Patient states she is feeling better. Patient's son is going to sta with her tonight. 1740 Patient discharged to home, her son will drive her. Patient and her son given home instructions, ice machine and patient's son took prescription for zofran and percocet.

## 2017-11-17 NOTE — BRIEF OP NOTE
Brief Postoperative Note    Valery Ventura  YOB: 1945  [id number]    Pre-operative Diagnosis: right shoulder rotator cuff tear, AC joint arthrosis    Post-operative Diagnosis: right shoulder rotator cuff tear, AC joint arthrosis, partial biceps tendon tear    Procedure: right shoulder arthroscopic rotator cuff repair, sub-acromial decompression, distal clavicle excision, extensive debridement (to include biceps tenotomy)    Anesthesia: General and Nerve Block    Surgeons/Assistants: Kiko    Estimated Blood Loss: less than 50     Complications: None    Specimens: Was Not Obtained    Findings: see op note    Electronically signed by Thea Cadet MD on 11/17/2017 at 3:01 PM

## 2017-11-17 NOTE — ANESTHESIA POST-OP
Anesthesia Post-op Note    Patient: Ping Newman  MRN: [id number]  YOB: 1945  Date of evaluation: 11/17/2017  Time:  3:18 PM     Procedure(s) Performed:     Last Vitals: BP (!) 118/94   Pulse 61   Temp 97.3 °F (36.3 °C) (Temporal)   Resp 16   Ht 5' 1.5\" (1.562 m)   Wt 135 lb 9.6 oz (61.5 kg)   SpO2 97%   BMI 25.21 kg/m²     [place]an Phase I:      Norman Phase II:      Anesthesia Post Evaluation    Final anesthesia type: general and regional  Patient location during evaluation: PACU  Patient participation: complete - patient participated  Level of consciousness: awake and alert  Nausea & Vomiting: no nausea and no vomiting  Complications: no  Cardiovascular status: hemodynamically stable  Respiratory status: acceptable  Hydration status: euvolemic        Vianey Dan MD  3:18 PM

## 2017-11-17 NOTE — TELEPHONE ENCOUNTER
[facility] cert CPT code 52488 for DOS 11/17/17.  Per automated system no pre cert is required Ref# [id number]

## 2017-11-17 NOTE — ANESTHESIA PRE-OP
Department of Anesthesiology  Preprocedure Note       Name:  Mortimer Many   Age:  74 y.o.  :  1945                                          MRN:  [id number]         Date:  2017      Surgeon:    Procedure:    Medications prior to admission:   Prior to Admission medications    Medication Sig Start Date End Date Taking? Authorizing Provider   aspirin 81 MG EC tablet Take 81 mg by mouth every morning Over The Counter    Historical Provider, MD   Cyanocobalamin (VITAMIN B-12 PO) Take by mouth every morning Over The Counter    Historical Provider, MD   digoxin (LANOXIN) 125 MCG tablet Take 1 tablet by mouth daily 10/25/17   Tiffanie Mills MD   ALPRAZolam [facility]) 0.5 MG tablet Take 0.5 mg by mouth 4 times daily  \"I Take Half Tablet 3 Times A Day, I Take 1.5 Tablets At Night\". 17   Historical Provider, MD   clobetasol (TEMOVATE) 0.05 % cream Apply twice daily 17   Johnny Tomlinson MD   triamterene-hydrochlorothiazide [facility]) 37.5-25 MG per tablet Take 1 tablet by mouth every other day 3/21/17   Maggie Damico MD   digoxin (LANOXIN) 125 MCG tablet Take 1 tablet by mouth daily Alternate 1 and  tab 9/28/16 11/15/17  Tiffanie Mills MD   atenolol (TENORMIN) 50 MG tablet Take 0.5 tablets by mouth daily Dose decrease as of 4/15/2013  Patient taking differently: Take 50 mg by mouth daily \"I Take Half Tablet Every Morning\" 16   Tiffanie Mills MD   Cholecalciferol (VITAMIN D) 2000 UNITS CAPS capsule Take 2,000 Units by mouth daily 16   Johnny Tomlinson MD   nortriptyline (PAMELOR) 10 MG/5ML solution Take 2 ml by mouth nightly (clarification as of 2012)- please give 30 day supply with 3 refills 4/15/13   Alverto Amaya MD   gabapentin (NEURONTIN) 250 MG/5ML solution Take 4 mL by mouth daily. Patient taking differently: nightly Take 3 mL by mouth daily.  12   Alverto Amaya MD   Multiple Vitamin (MULTIVITAMIN PO) Take 1 tablet by mouth every Muscle Aches And Pains\"    Sulfa Antibiotics Rash    Vicodin [Hydrocodone-Acetaminophen]      \"Dizziness\"    Zetia [Ezetimibe] Palpitations     \"Heart Palpitations\"       Problem List:    Patient Active Problem List   Diagnosis Code    Depression with anxiety F41.8    Vitamin D deficiency E55.9    Anemia D64.9    Insomnia G47.00    Fibromyalgia M79.7    Fatigue R53.83    Rash R21    HTN (hypertension) I10    Palpitations R00.2    IBS (irritable bowel syndrome) K58.9    Arrhythmia I49.9    Depression F32.9    Anxiety F41.9    Interstitial cystitis N30.10    Angioedema T78. 3XXA    Chest pain R07.9    Mixed hyperlipidemia E78.2    CKD (chronic kidney disease) N18.9    Chronic kidney disease, stage III (moderate) N18.3    Essential hypertension I10    Coronary artery disease involving native coronary artery of native heart without angina pectoris I25.10    Anxiety F41.9    Lymphedema I89.0       Past Medical History:        Diagnosis Date    Anemia     Angioedema 4/7/2014    APC (atrial premature contractions) 2/2012    Infrequent APC's-Dr Юлия Whyte    Arrhythmia 4/7/2014    Brain aneurysm      [facility]erred to [facility] Dr Tera Kline    CKD (chronic kidney disease)     Sees Dr. Grijalva Busing Depression with anxiety 2014    Dr. Thomos Rinne managing xanax and cymbalta    Difficulty swallowing     \"Large Pills\"   [address] [place]    GE reflux 2006    GERD (gastroesophageal reflux disease)     H/O cardiovascular stress test 2-3-12    2-3-12, EF 70 % normal study    H/O echocardiogram 2-3-12    2-3-12 EF 55%, normal test    Hemorrhoids     + colonoscopy 2011 Dr. Collins Ohs History of 24 hour EKG monitoring 2-7-12    48 hour holter, predonimant rhythm is SInus rhythum, Max  and min HR 64, infrequent PVC's and APC's.     [place] (hard of hearing)     Bilateral Hearing Aids    Hx of cardiovascular stress test 6/12/2015    lexiscan-mild

## 2017-11-17 NOTE — H&P
Sam Lies Depression with anxiety 2014    Dr. Prince Lackey managing xanax and cymbalta    Difficulty swallowing     \"Large Pills\"   [address] [place]    GE reflux 2006    GERD (gastroesophageal reflux disease)     H/O cardiovascular stress test 2-3-12    2-3-12, EF 70 % normal study    H/O echocardiogram 2-3-12    2-3-12 EF 55%, normal test    Hemorrhoids     + colonoscopy 2011 Dr. Jon Laughlin History of 24 hour EKG monitoring 2-7-12    48 hour holter, predonimant rhythm is SInus rhythum, Max  and min HR 64, infrequent PVC's and APC's.     [place] (hard of hearing)     Bilateral Hearing Aids    Hx of cardiovascular stress test 6/12/2015    lexiscan-mild ischemia apical,EF70%    Hyperlipidemia     Hypertension     IBS (irritable bowel syndrome) 7/7/2013    Dr. Sofi Hare, IBS diet, immodium prn (2013)    [facility] Internal hemorrhoids 2011    Dr. Jono Allison Interstitial cystitis     Dr. Heron Lance with cath    MVP (mitral valve prolapse)     Palpitations     Following with  Dr. Waqas Shannon, and on digoxin    Pap smear for cervical cancer screening     Dr. Daniela Cronin     Pulmonary nodule     9/14 CT chest 8mm nodule, no change, recheck 9/15    PVC (premature ventricular contraction) 2/2012    Infrequent PVC's-Dr. Marva Lance    Right inguinal pain     referred from Dr. Claudean Fries to Dr. Zoey Mcfarlane then PT ( no hernia)    Shortness of breath on exertion     Skin cancer of nose In Past    Dr. Catracho Jiang,  previously see Dr. Lenin Mckeon Teeth missing     Upper And Lower    Vitamin D deficiency     Wears glasses     Wears partial dentures     Upper       Past Surgical History:   Procedure Laterality Date    BLADDER SURGERY  2000, 2008    Dr. Heron Lance- dilatation    Breonna Fay  2011    Dr. Brando Santoro [facility] in [place];Grade 1 internal hemorrhoids    DENTAL SURGERY      Teeth Extracted In Past    ENDOSCOPY, COLON, DIAGNOSTIC  In Past    EYE SURGERY Left 11/2017    Cataract With Lens Implant    HYSTERECTOMY, TOTAL ABDOMINAL  1/14    SKIN CANCER EXCISION  In Past    Nose    TONSILLECTOMY  26's       Family History   Problem Relation Age of Onset    Mental Illness Son     Arthritis Mother     Hearing Loss Mother     Heart Disease Mother      \"Aneurysm In [place]"    Stroke Father     Heart Attack Father     Heart Disease Father      Heart Attack    Diabetes Father     Other Brother      \"Colon Polyps\"    Heart Disease Brother      \"Heart Surgery\"       Social History     Social History    Marital status:      Spouse name: N/A    Number of children: N/A    Years of education: N/A     Social History Main Topics    Smoking status: Never Smoker    Smokeless tobacco: Never Used    Alcohol use No    Drug use: No    Sexual activity: No     Other Topics Concern    Not on file     Social History Narrative        Exercise - none    Retired college counselor       Current Outpatient Prescriptions   Medication Sig Dispense Refill    aspirin 81 MG EC tablet Take 81 mg by mouth every morning Over The Counter      Cyanocobalamin (VITAMIN B-12 PO) Take by mouth every morning Over The Counter      digoxin (LANOXIN) 125 MCG tablet Take 1 tablet by mouth daily 90 tablet 0    ALPRAZolam (XANAX) 0.5 MG tablet Take 0.5 mg by mouth 4 times daily  \"I Take Half Tablet 3 Times A Day, I Take 1.5 Tablets At Night\".       clobetasol (TEMOVATE) 0.05 % cream Apply twice daily 60 g 3    triamterene-hydrochlorothiazide (MAXZIDE-25) 37.5-25 MG per tablet Take 1 tablet by mouth every other day 90 tablet 3    digoxin (LANOXIN) 125 MCG tablet Take 1 tablet by mouth daily Alternate 1 and 1/2 tab 135 tablet 3    atenolol (TENORMIN) 50 MG tablet Take 0.5 tablets by mouth daily Dose decrease as of 4/15/2013 (Patient taking differently: Take 50 mg by mouth daily \"I Take Half Tablet Every Morning\") 45 tablet 3    Cholecalciferol (VITAMIN D) 2000 UNITS CAPS review:  reviewed (2 views of the right shoulder, 2V of scapula) and show  Small osteophyte of the inferior glenoid, no fracture    reviewed  2 views of the left shoulder and 2 left scapula show no fracture, dislocation, swelling or degenerative changes noted                  Impression:   Right shoulder rotator cuff tear (high grade partial thickness)    Plan:  Natural history and expected course discussed. Questions answered. She would like to proceed with surgical treatment   Medical clearance reviewed  Clearance sent to Dr. Steph Valencia  No med list given  Surgical date to change, patient wants to be admitted for pain control/social issues  Patient and surgery scheduler to work out a date    After discussing continued non-operative vs operative treatment, I recommended right shoulder arthroscopic surgery. She desires to proceed. The planned procedure/risks/benefits/alternatives were discussed. Plan is for right shoulder diagnostic arthroscopy then treatment per findings (most likely rotator cuff repair), partially open/open procedure if needed. The risks include, but are not limited to, damage to blood vessels and nerves, infection, persistent pain and/or symptoms, stiffness, blood clots, need for further surgery, anesthesia complications and other. She understands the above and desires to proceed and all questions were answered.     Addendum on 16 Nov:  Patient did arrange for assistance at home, will proceed with outpatient surgery

## 2017-11-18 NOTE — OP NOTE
portal.    The rotator cuff was then visualized from the bursal side. It could be  seen that this was actually a full-thickness tear. The marking stitch was  removed. There was some frayed tissue at the insertion of the rotator  cuff. This was lightly debrided with the shaver and the bone was lightly  decorticated. The edge of the rotator cuff tendon tear was freshened up  with the shaver and then the anchor was placed. This was done with spinal  needle localization followed by a small stab incision followed by the punch  followed placing the anchor at the center of the insertion of the tear. This was a double-loaded anchor. Both pairs of suture were used and passed  with a scorpion and tied in horizontal mattress fashion with alternating  half hitches. All 4 suture tails were then loaded up onto a SwiveLock and  the  hole was punched followed by placing the SwiveLock after  tensioning the sutures just lateral to the medial anchor. This nicely brought  the edge of the cuff down for a double row repair and with internal and  external rotation of the arm, there was excellent apposition of the cuff to  bone and a left off. All the instruments were then removed. The portal  sites were closed with nylon suture, followed by Xeroform and a sterile  dressing. The arm was placed into a sling with a plan of placing this into  abduction pad sling in the recovery. She was extubated, sent to the  recovery room in good condition. No complications.     Handy Lazo MD    D: 11/17/2017 15:26:10       T: 11/17/2017 20:25:04     [id number]  Job#: [id number]     Doc#: [id number]

## 2017-11-19 PROBLEM — R41.82 ALTERED MENTAL STATUS: Status: ACTIVE | Noted: 2017-11-19

## 2017-11-20 ENCOUNTER — TELEPHONE (OUTPATIENT)
Dept: ORTHOPEDIC SURGERY | Age: 72
End: 2017-11-20

## 2017-11-20 ENCOUNTER — HOSPITAL ENCOUNTER (OUTPATIENT)
Dept: SURGERY | Age: 72
Discharge: HOME | End: 2017-11-17
Attending: ORTHOPAEDIC SURGERY | Admitting: ORTHOPAEDIC SURGERY

## 2017-11-20 NOTE — TELEPHONE ENCOUNTER
Patients son Called to let us know that ranjith is currently admitted in the hosp. They think she is having some type of reaction to the medication and anesthesia. Her son would like to know what  It is that she should do about post op PT. They are trying to get her into [facility] so she is not by herself.  Please review and advise

## 2017-11-28 LAB
ALBUMIN SERPL-MCNC: 3.9 GM/DL (ref 3.4–5)
ALP BLD-CCNC: 78 IU/L (ref 40–128)
ALT SERPL-CCNC: 17 U/L (ref 10–40)
ANION GAP SERPL CALCULATED.3IONS-SCNC: 14 MMOL/L (ref 4–16)
AST SERPL-CCNC: 14 IU/L (ref 15–37)
BILIRUB SERPL-MCNC: 0.3 MG/DL (ref 0–1)
BUN BLDV-MCNC: 26 MG/DL (ref 6–23)
CALCIUM SERPL-MCNC: 8.8 MG/DL (ref 8.3–10.6)
CHLORIDE BLD-SCNC: 100 MMOL/L (ref 99–110)
CO2: 30 MMOL/L (ref 21–32)
CREAT SERPL-MCNC: 1.6 MG/DL (ref 0.6–1.1)
ESTIMATED AVERAGE GLUCOSE: 131 MG/DL
GFR AFRICAN AMERICAN: 38 ML/MIN/1.73M2
GFR NON-AFRICAN AMERICAN: 32 ML/MIN/1.73M2
GLUCOSE BLD-MCNC: 120 MG/DL (ref 70–99)
HBA1C MFR BLD: 6.2 % (ref 4.2–6.3)
HCT VFR BLD CALC: 40.3 % (ref 37–47)
HEMOGLOBIN: 12.3 GM/DL (ref 12.5–16)
MCH RBC QN AUTO: 28.6 PG (ref 27–31)
MCHC RBC AUTO-ENTMCNC: 30.5 % (ref 32–36)
MCV RBC AUTO: 93.7 FL (ref 78–100)
PDW BLD-RTO: 15.7 % (ref 11.7–14.9)
PLATELET # BLD: 210 K/CU MM (ref 140–440)
PMV BLD AUTO: 11.5 FL (ref 7.5–11.1)
POTASSIUM SERPL-SCNC: 4.1 MMOL/L (ref 3.5–5.1)
RBC # BLD: 4.3 M/CU MM (ref 4.2–5.4)
SODIUM BLD-SCNC: 144 MMOL/L (ref 135–145)
TOTAL PROTEIN: 5.8 GM/DL (ref 6.4–8.2)
WBC # BLD: 10.6 K/CU MM (ref 4–10.5)

## 2017-12-01 ENCOUNTER — OFFICE VISIT (OUTPATIENT)
Dept: ORTHOPEDIC SURGERY | Age: 72
End: 2017-12-01

## 2017-12-01 VITALS — BODY MASS INDEX: 27.75 KG/M2 | HEIGHT: 61 IN | RESPIRATION RATE: 16 BRPM | WEIGHT: 147 LBS

## 2017-12-01 DIAGNOSIS — M75.121 COMPLETE TEAR OF RIGHT ROTATOR CUFF: Primary | ICD-10-CM

## 2017-12-01 PROCEDURE — 99024 POSTOP FOLLOW-UP VISIT: CPT | Performed by: PHYSICIAN ASSISTANT

## 2018-01-16 ENCOUNTER — TELEPHONE (OUTPATIENT)
Dept: ORTHOPEDIC SURGERY | Age: 73
End: 2018-01-16

## 2018-01-23 NOTE — TELEPHONE ENCOUNTER
Called oswaldo ames msg stating they should be doing RTC protocol asked if they had been and status of patient and sling, to call back with any questions or info needed

## 2018-01-25 ENCOUNTER — OFFICE VISIT (OUTPATIENT)
Dept: ORTHOPEDIC SURGERY | Age: 73
End: 2018-01-25

## 2018-01-25 VITALS — HEIGHT: 61 IN | WEIGHT: 147 LBS | BODY MASS INDEX: 27.75 KG/M2 | RESPIRATION RATE: 16 BRPM

## 2018-01-25 DIAGNOSIS — Z98.890 S/P ARTHROSCOPY OF SHOULDER: Primary | ICD-10-CM

## 2018-01-25 PROBLEM — R60.0 EDEMA OF BOTH LOWER LEGS DUE TO PERIPHERAL VENOUS INSUFFICIENCY: Status: ACTIVE | Noted: 2018-01-25

## 2018-01-25 PROBLEM — M75.101 RIGHT ROTATOR CUFF TEAR: Status: ACTIVE | Noted: 2017-01-01

## 2018-01-25 PROBLEM — F33.1 DEPRESSION, MAJOR, RECURRENT, MODERATE (HCC): Status: ACTIVE | Noted: 2018-01-25

## 2018-01-25 PROBLEM — I87.2 EDEMA OF BOTH LOWER LEGS DUE TO PERIPHERAL VENOUS INSUFFICIENCY: Status: ACTIVE | Noted: 2018-01-25

## 2018-01-25 PROBLEM — I67.1 BRAIN ANEURYSM: Status: ACTIVE | Noted: 2018-01-25

## 2018-01-25 PROCEDURE — 99024 POSTOP FOLLOW-UP VISIT: CPT | Performed by: ORTHOPAEDIC SURGERY

## 2018-01-25 NOTE — PROGRESS NOTES
Scribe Authentication Statement  Susie Renae, scribed portions of this documentation for and in the presence of Dr. Sharon Foote on 1/25/18 at 2:33 PM.    Provider Jah Ling M.D., personally performed the services described in this documentation and they were scribed in my presence by the above listed scribe. The documentation is both accurate and complete. Date of surgery:   November 17th, 2017       History:  Ms. Rodolfo Stokes is here in follow-up s/p her right shoulder surgery. PROCEDURES:  Right shoulder arthroscopic rotator cuff repair (1+1 anchors), subacromial  decompression, distal clavicle excision, and extensive debridement (to  include biceps tenotomy). She is doing rather well. She is having 0/10 pain, no other issues or problems, denies numbness/tingling in the arm. She is still wearing sling. The patient is still in therapy. Physical: She demonstrates appropriate mood and affect and is in no distress. right shoulder exam:  The incisions are well healed with no erythema. She has moderate swelling. The arm and forearm compartments are soft and non-tender. She is neurovascularly intact distally. Shoulder active range of motion is forward elevation to 180°, external rotation to 50°, and internal rotation to T10    Rotator cuff strength is 5 over 5    Impression: Status post above, Doing well postoperatively.         Plan:     Stop use of sling   Continue physical therapy  Ok to begin gentle strengthening  No heavy use of arm on your own until at least 4 months out from surgery  After 5 months post surgery ok for any activity   Follow up as needed

## 2018-01-25 NOTE — PATIENT INSTRUCTIONS
Stop use of sling   Continue physical therapy  Ok to begin gentle strengthening  No heavy use of arm on your own until at least 4 months out from surgery  After 5 months post surgery ok for any activity   Follow up as needed     SHOW THIS FORM TO YOUR THERAPIST

## 2018-02-22 LAB
ALBUMIN SERPL-MCNC: 4.7 G/DL
ALP BLD-CCNC: 100 U/L
ALT SERPL-CCNC: 13 U/L
ANION GAP SERPL CALCULATED.3IONS-SCNC: NORMAL MMOL/L
AST SERPL-CCNC: 18 U/L
BASOPHILS ABSOLUTE: NORMAL /ΜL
BASOPHILS RELATIVE PERCENT: NORMAL %
BILIRUB SERPL-MCNC: 0.4 MG/DL (ref 0.1–1.4)
BUN BLDV-MCNC: 24 MG/DL
CALCIUM SERPL-MCNC: 9.4 MG/DL
CHLORIDE BLD-SCNC: 98 MMOL/L
CHOLESTEROL, TOTAL: 262 MG/DL
CHOLESTEROL/HDL RATIO: ABNORMAL
CO2: 30 MMOL/L
CREAT SERPL-MCNC: 1.5 MG/DL
EOSINOPHILS ABSOLUTE: NORMAL /ΜL
EOSINOPHILS RELATIVE PERCENT: NORMAL %
GFR CALCULATED: NORMAL
GLUCOSE BLD-MCNC: 140 MG/DL
HCT VFR BLD CALC: 42 % (ref 36–46)
HDLC SERPL-MCNC: 31 MG/DL (ref 35–70)
HEMOGLOBIN: 13.8 G/DL (ref 12–16)
LDL CHOLESTEROL CALCULATED: ABNORMAL MG/DL (ref 0–160)
LYMPHOCYTES ABSOLUTE: NORMAL /ΜL
LYMPHOCYTES RELATIVE PERCENT: NORMAL %
MCH RBC QN AUTO: NORMAL PG
MCHC RBC AUTO-ENTMCNC: NORMAL G/DL
MCV RBC AUTO: NORMAL FL
MONOCYTES ABSOLUTE: NORMAL /ΜL
MONOCYTES RELATIVE PERCENT: NORMAL %
NEUTROPHILS ABSOLUTE: NORMAL /ΜL
NEUTROPHILS RELATIVE PERCENT: NORMAL %
PLATELET # BLD: 237 K/ΜL
PMV BLD AUTO: NORMAL FL
POTASSIUM SERPL-SCNC: 4.2 MMOL/L
RBC # BLD: 4.86 10^6/ΜL
SODIUM BLD-SCNC: 142 MMOL/L
TOTAL PROTEIN: 6.8
TRIGL SERPL-MCNC: 428 MG/DL
VLDLC SERPL CALC-MCNC: 4.7 MG/DL
WBC # BLD: 8.4 10^3/ML

## 2018-03-06 RX ORDER — DIGOXIN 125 MCG
125 TABLET ORAL DAILY
Qty: 90 TABLET | Refills: 3 | Status: SHIPPED | OUTPATIENT
Start: 2018-03-06 | End: 2019-04-22 | Stop reason: SDUPTHER

## 2018-03-06 RX ORDER — ATENOLOL 50 MG/1
25 TABLET ORAL DAILY
Qty: 45 TABLET | Refills: 3 | Status: SHIPPED | OUTPATIENT
Start: 2018-03-06 | End: 2019-01-29 | Stop reason: SDUPTHER

## 2018-03-09 NOTE — TELEPHONE ENCOUNTER
Called patients insurance to pre cert CPT code 11100 for DOS 11/17/17. Per automated sytem no pre cert is required.  Ref #[id number]
Normal rate, regular rhythm.  Heart sounds S1, S2.  No murmurs, rubs or gallops.

## 2018-03-16 ENCOUNTER — TELEPHONE (OUTPATIENT)
Dept: CARDIOLOGY CLINIC | Age: 73
End: 2018-03-16

## 2018-03-16 NOTE — TELEPHONE ENCOUNTER
Left message on voicemail for patient to return my call. Received labwork from PCP, Dr. Jas Montero, Dr. Mayra Alejandro would like to start patient on [facility]. Awaiting a return phone call.

## 2018-04-27 ENCOUNTER — HOSPITAL ENCOUNTER (OUTPATIENT)
Dept: OTHER | Age: 73
Discharge: HOME | End: 2018-04-27
Attending: SPECIALIST | Admitting: SPECIALIST

## 2018-04-27 LAB
BACTERIA: ABNORMAL /HPF
BILIRUBIN URINE: NEGATIVE MG/DL
BLOOD, URINE: NEGATIVE
CLARITY: CLEAR
COLOR: ABNORMAL
GLUCOSE, URINE: NEGATIVE MG/DL
KETONES, URINE: NEGATIVE MG/DL
LEUKOCYTE ESTERASE, URINE: ABNORMAL
MUCUS: ABNORMAL HPF
NITRITE URINE, QUANTITATIVE: NEGATIVE
PH, URINE: 5 (ref 5–8)
PROTEIN UA: NEGATIVE MG/DL
RBC URINE: 1 /HPF (ref 0–6)
SPECIFIC GRAVITY UA: 1 (ref 1–1.03)
SQUAMOUS EPITHELIAL: <1 /HPF
TRICHOMONAS: ABNORMAL /HPF
UROBILINOGEN, URINE: NORMAL MG/DL (ref 0.2–1)
WBC UA: 1 /HPF (ref 0–5)

## 2018-04-28 LAB
CULTURE: NORMAL
REPORT STATUS: NORMAL
REQUEST PROBLEM: NORMAL
SPECIMEN: NORMAL

## 2018-05-09 PROBLEM — N30.00 ACUTE CYSTITIS WITHOUT HEMATURIA: Status: ACTIVE | Noted: 2018-05-09

## 2018-05-16 ENCOUNTER — HOSPITAL ENCOUNTER (OUTPATIENT)
Dept: OTHER | Age: 73
Discharge: HOME | End: 2018-05-16
Attending: INTERNAL MEDICINE | Admitting: INTERNAL MEDICINE

## 2018-08-08 ENCOUNTER — HOSPITAL ENCOUNTER (OUTPATIENT)
Dept: OTHER | Age: 73
Discharge: HOME | End: 2018-08-08
Attending: OBSTETRICS & GYNECOLOGY | Admitting: OBSTETRICS & GYNECOLOGY

## 2018-08-12 LAB
CULTURE: NORMAL
GRAM SMEAR: NORMAL
Lab: NORMAL
REPORT STATUS: NORMAL
SPECIMEN: NORMAL

## 2018-12-19 ENCOUNTER — OFFICE VISIT (OUTPATIENT)
Dept: CARDIOLOGY CLINIC | Age: 73
End: 2018-12-19
Payer: MEDICARE

## 2018-12-19 VITALS
DIASTOLIC BLOOD PRESSURE: 66 MMHG | WEIGHT: 127 LBS | SYSTOLIC BLOOD PRESSURE: 102 MMHG | HEART RATE: 60 BPM | BODY MASS INDEX: 23.98 KG/M2 | HEIGHT: 61 IN

## 2018-12-19 DIAGNOSIS — I10 ESSENTIAL HYPERTENSION: Primary | ICD-10-CM

## 2018-12-19 DIAGNOSIS — I25.10 CORONARY ARTERY DISEASE INVOLVING NATIVE CORONARY ARTERY OF NATIVE HEART WITHOUT ANGINA PECTORIS: ICD-10-CM

## 2018-12-19 PROCEDURE — 99213 OFFICE O/P EST LOW 20 MIN: CPT | Performed by: INTERNAL MEDICINE

## 2018-12-19 NOTE — PROGRESS NOTES
No current facility-administered medications for this visit. Allergies: Demerol; Pcn [penicillins]; Statins; Sulfa antibiotics; Vicodin [hydrocodone-acetaminophen]; Zetia [ezetimibe]; Adhesive tape; Glycerin; Percocet [oxycodone-acetaminophen]; and Meperidine  Past Medical History:   Diagnosis Date    Anemia     Angioedema 4/7/2014    APC (atrial premature contractions) 2/2012    Infrequent APC's-Dr Duane Cava Arrhythmia 4/7/2014    Arthritis     Brain aneurysm      referred to [facility] Dr Cherelle Gutierrez    CKD (chronic kidney disease)     Sees Dr. Sylvester Amador- \"stage 3\"    Depression with anxiety 2014    Dr. Anthony Abdi managing xanax and cymbalta    Diabetes mellitus ([facility] [address]     \"pre- diabetic\"\"no medication yet\" per pt on 4/19/2018    Difficulty swallowing     \"Large Pills\"   [address]\"have fibromyalgia in all 18 trigger points and my hands an feet are swollen all the time- been to [facility] 4 times for this\"    GE reflux 2006    GERD (gastroesophageal reflux disease)     H/O cardiovascular stress test 2-3-12    2-3-12, EF 70 % normal study    H/O echocardiogram 2-3-12    2-3-12 EF 55%, normal test    Hemorrhoids     + colonoscopy 2011 Dr. Magalie Rivas History of 24 hour EKG monitoring 2-7-12    48 hour holter, predonimant rhythm is SInus rhythum, Max  and min HR 64, infrequent PVC's and APC's.     [place] (hard of hearing)     Bilateral Hearing Aids    Hx of cardiovascular stress test 6/12/2015    lexiscan-mild ischemia apical,EF70%    Hyperlipidemia     Hypertension     follows with Dr Yue Gurrola    IBS (irritable bowel syndrome) 7/7/2013    Dr. Alize Javier, IBS diet, immodium prn (2013)    [facility] Internal hemorrhoids 2011    Dr. Karolina Swain Interstitial cystitis     Dr. Arboleda Flatness with cath    MVP (mitral valve prolapse)     Palpitations     Following with  Dr. Leroy Mao, and on digoxin    Pap smear for cervical cancer screening     [facility]     9/14 CT chest 8mm nodule, no change, recheck 9/15    PVC (premature ventricular contraction) 2/2012    Infrequent PVC's-Dr. Katelynn Goins    Right inguinal pain     referred from Dr. Anne Reid to Dr. Kate Shafer then PT ( no hernia)    Shortness of breath on exertion     Skin cancer of nose In Past    Dr. Shanique Gilmore,  previously see Dr. Purnima Mcmillan Teeth missing     Upper And Lower    UTI (urinary tract infection) 03/2018    Vitamin D deficiency     Wears glasses     Wears partial dentures     Upper     Past Surgical History:   Procedure Laterality Date    BLADDER SURGERY  2000, 2008    Dr. Monte Cover- dilatation    Edy Farrell  2011    Dr. Rula Mireles [facility] [place];Grade 1 internal hemorrhoids    DENTAL SURGERY      Teeth Extracted In Past    ENDOSCOPY, COLON, DIAGNOSTIC  In Past    EYE SURGERY Left 11/2017    Cataract With Lens Implant    HYSTERECTOMY, TOTAL ABDOMINAL  1/14    \"took everything    SHOULDER ARTHROSCOPY Right 11/2017    rotator cuff repair, SAd, distal clavicle excision, extensive debridement    SKIN CANCER EXCISION  In Past    Nose    TONSILLECTOMY  1960's      As reviewed   Family History   Problem Relation Age of Onset    Mental Illness Son     Arthritis Mother     Hearing Loss Mother     Heart Disease Mother         \"Aneurysm In [place]"    Stroke Father     Heart Attack Father     Heart Disease Father         Heart Attack    Diabetes Father     Other Brother         \"Colon Polyps\"    Heart Disease Brother         \"Heart Surgery\"     Social History   Substance Use Topics    Smoking status: Never Smoker    Smokeless tobacco: Never Used    Alcohol use No      Review of Systems:    Constitutional: Negative for diaphoresis and fatigue  Psychological:Negative for anxiety or depression  HENT: Negative for headaches, nasal congestion, sinus pain or vertigo  Eyes: Negative for visual disturbance.    Endocrine: Negative for polydipsia/polyuria  Respiratory: Negative for shortness of breath  Cardiovascular: Negative for chest pain, dyspnea on exertion, claudication, edema, irregular heartbeat, murmur, palpitations or shortness of breath  Gastrointestinal: Negative for abdominal pain or heartburn  Genito-Urinary: Negative for urinary frequency/urgency  Musculoskeletal: Negative for muscle pain, muscular weakness, negative for pain in arm and leg or swelling in foot and leg  Neurological: Negative for dizziness, headaches, memory loss, numbness/tingling, visual changes, syncope  Dermatological: Negative for rash    Objective:  /66   Pulse 60   Ht 5' 1\" (1.549 m)   Wt 127 lb (57.6 kg)   BMI 24.00 kg/m²   Wt Readings from Last 3 Encounters:   12/19/18 127 lb (57.6 kg)   05/09/18 129 lb 6.4 oz (58.7 kg)   04/19/18 131 lb (59.4 kg)     Body mass index is 24 kg/m². GENERAL - Alert, oriented, pleasant, in no apparent distress. EYES: No jaundice, no conjunctival pallor. SKIN: It is warm & dry. No rashes. No Echhymosis    HEENT - No clinically significant abnormalities seen. Neck - Supple. No jugular venous distention noted. No carotid bruits. Cardiovascular - Normal S1 and S2 without obvious murmur or gallop. Extremities - No cyanosis, clubbing, or significant edema. Pulmonary - No respiratory distress. No wheezes or rales. Abdomen - No masses, tenderness, or organomegaly. Musculoskeletal - No significant edema. No joint deformities. No muscle wasting. Neurologic - Cranial nerves II through XII are grossly intact. There were no gross focal neurologic abnormalities.     Lab Review   Lab Results   Component Value Date    CKTOTAL 49 08/07/2017    CKMB 0.9 05/09/2011    TROPONINT <0.010 11/19/2017     BNP:    Lab Results   Component Value Date    BNP 37 05/09/2011     PT/INR:    Lab Results   Component Value Date    INR 0.96 07/06/2015     Lab Results   Component Value Date    LABA1C 6.2 11/28/2017    LABA1C Edema of both lower legs due to peripheral venous insufficiency I87.2, R60.9    Right rotator cuff tear M75. 101    Acute cystitis without hematuria N30.00       Assessment & Plan:    -  Hypertension: Patients blood pressure is normal. Patient is advised about low sodium diet. Present medical regimen will not be changed. -  LIPID MANAGEMENT:  Available lipid  lab data reviewed  and patient was given dietary advice. NCEP- ATP III guidelines reviewed with patient. -   Changes  in medicines made:  No                         - Dizziness Abelardo               [facility]

## 2019-01-29 ENCOUNTER — PROCEDURE VISIT (OUTPATIENT)
Dept: CARDIOLOGY CLINIC | Age: 74
End: 2019-01-29
Payer: MEDICARE

## 2019-01-29 DIAGNOSIS — I10 ESSENTIAL HYPERTENSION: ICD-10-CM

## 2019-01-29 DIAGNOSIS — I25.10 CORONARY ARTERY DISEASE INVOLVING NATIVE CORONARY ARTERY OF NATIVE HEART WITHOUT ANGINA PECTORIS: ICD-10-CM

## 2019-01-29 LAB
LV EF: 60 %
LVEF MODALITY: NORMAL

## 2019-01-29 PROCEDURE — A9500 TC99M SESTAMIBI: HCPCS | Performed by: INTERNAL MEDICINE

## 2019-01-29 PROCEDURE — 93015 CV STRESS TEST SUPVJ I&R: CPT | Performed by: INTERNAL MEDICINE

## 2019-01-29 PROCEDURE — 78452 HT MUSCLE IMAGE SPECT MULT: CPT | Performed by: INTERNAL MEDICINE

## 2019-01-29 RX ORDER — ATENOLOL 50 MG/1
25 TABLET ORAL DAILY
Qty: 45 TABLET | Refills: 3 | Status: SHIPPED | OUTPATIENT
Start: 2019-01-29 | End: 2020-03-13 | Stop reason: SDUPTHER

## 2019-01-30 ENCOUNTER — TELEPHONE (OUTPATIENT)
Dept: CARDIOLOGY CLINIC | Age: 74
End: 2019-01-30

## 2019-04-17 RX ORDER — DIGOXIN 125 MCG
125 TABLET ORAL DAILY
Qty: 90 TABLET | Refills: 3 | OUTPATIENT
Start: 2019-04-17

## 2019-04-19 ENCOUNTER — TELEPHONE (OUTPATIENT)
Dept: CARDIOLOGY CLINIC | Age: 74
End: 2019-04-19

## 2019-04-19 DIAGNOSIS — I25.10 CORONARY ARTERY DISEASE INVOLVING NATIVE CORONARY ARTERY OF NATIVE HEART WITHOUT ANGINA PECTORIS: Primary | ICD-10-CM

## 2019-04-19 NOTE — TELEPHONE ENCOUNTER
Patient received a message that she needs a ekg before we can fill her Digoxin  Her mother is passing away  she has not left her side for several days she is asking if we can call her refill for this medication in for her , doesn't want to leave her side

## 2019-04-19 NOTE — TELEPHONE ENCOUNTER
Spoke with patient, per Sally Coppola NP we will give 30 days rx, she will need dig level and OV in 3-4 weeks with EKG.  Patient agrees

## 2019-04-22 RX ORDER — DIGOXIN 125 MCG
125 TABLET ORAL DAILY
Qty: 90 TABLET | Refills: 3 | Status: SHIPPED | OUTPATIENT
Start: 2019-04-22 | End: 2019-04-23

## 2019-04-23 RX ORDER — DIGOXIN 125 MCG
125 TABLET ORAL DAILY
Qty: 30 TABLET | Refills: 0 | Status: SHIPPED | OUTPATIENT
Start: 2019-04-23 | End: 2019-05-21 | Stop reason: SDUPTHER

## 2019-05-08 ENCOUNTER — HOSPITAL ENCOUNTER (OUTPATIENT)
Age: 74
Discharge: HOME | End: 2019-05-08
Payer: MEDICARE

## 2019-05-08 LAB
DIGOXIN LEVEL: 0.9 NG/ML (ref 0.8–2)
DOSE AMOUNT: NORMAL
DOSE TIME: NORMAL

## 2019-05-08 PROCEDURE — 80162 ASSAY OF DIGOXIN TOTAL: CPT

## 2019-05-08 PROCEDURE — 36415 COLL VENOUS BLD VENIPUNCTURE: CPT

## 2019-05-21 RX ORDER — DIGOXIN 125 MCG
125 TABLET ORAL DAILY
Qty: 90 TABLET | Refills: 3 | Status: SHIPPED | OUTPATIENT
Start: 2019-05-21 | End: 2020-03-13 | Stop reason: SDUPTHER

## 2019-07-18 ENCOUNTER — NURSE ONLY (OUTPATIENT)
Dept: CARDIOLOGY CLINIC | Age: 74
End: 2019-07-18
Payer: MEDICARE

## 2019-07-18 ENCOUNTER — OFFICE VISIT (OUTPATIENT)
Dept: CARDIOLOGY CLINIC | Age: 74
End: 2019-07-18
Payer: MEDICARE

## 2019-07-18 VITALS
DIASTOLIC BLOOD PRESSURE: 62 MMHG | RESPIRATION RATE: 16 BRPM | WEIGHT: 127.4 LBS | SYSTOLIC BLOOD PRESSURE: 104 MMHG | HEART RATE: 60 BPM | BODY MASS INDEX: 24.07 KG/M2

## 2019-07-18 DIAGNOSIS — R00.2 HEART PALPITATIONS: Primary | ICD-10-CM

## 2019-07-18 DIAGNOSIS — I10 ESSENTIAL HYPERTENSION: ICD-10-CM

## 2019-07-18 DIAGNOSIS — R00.2 PALPITATIONS: Primary | ICD-10-CM

## 2019-07-18 DIAGNOSIS — R00.2 HEART PALPITATIONS: ICD-10-CM

## 2019-07-18 PROCEDURE — 99213 OFFICE O/P EST LOW 20 MIN: CPT | Performed by: NURSE PRACTITIONER

## 2019-07-18 PROCEDURE — 93000 ELECTROCARDIOGRAM COMPLETE: CPT | Performed by: NURSE PRACTITIONER

## 2019-07-18 ASSESSMENT — ENCOUNTER SYMPTOMS
SHORTNESS OF BREATH: 0
VOMITING: 0
CONSTIPATION: 0
ABDOMINAL DISTENTION: 0
BACK PAIN: 0
ABDOMINAL PAIN: 0
SINUS PRESSURE: 0
NAUSEA: 0
SINUS PAIN: 0
WHEEZING: 0
DIARRHEA: 0
EYE REDNESS: 0
SORE THROAT: 0
COUGH: 0
COLOR CHANGE: 0
EYE ITCHING: 0

## 2019-07-18 NOTE — PROGRESS NOTES
21 days e-cardio monitor placed.  # [id number]. Instructed patient on how to record the event and to call monitoring center at [phone] if any problems arise. Instructed patient to disconnect the lead wires from the electrodes before bathing or showering and reattach them afterwards. Instructed patient that the electrodes should be changed every 3 days or if they no longer adhere to the skin. Patient to mail package after the monitor has ended. Patient verbalized understanding.

## 2019-07-18 NOTE — PROGRESS NOTES
 Probiotic Product (PROBIOTIC-10 PO) Take 1 tablet by mouth      Folic Acid 5 MG CAPS Take 15 mg by mouth daily      acetaminophen (TYLENOL) 325 MG tablet Take 2 tablets by mouth every 6 hours as needed for Pain 120 tablet 0    aspirin 81 MG EC tablet Take 81 mg by mouth every morning Over The Counter      Cyanocobalamin (VITAMIN B-12 PO) Take by mouth every morning Over The Counter      ALPRAZolam (XANAX) 0.5 MG tablet Take 0.5 mg by mouth 4 times daily  \"I Take Half Tablet 3 Times A Day, I Take 1.5 Tablets At Night\".  clobetasol (TEMOVATE) 0.05 % cream Apply twice daily 60 g 3    Cholecalciferol (VITAMIN D) 2000 UNITS CAPS capsule Take 2,000 Units by mouth daily 90 capsule 3    nortriptyline (PAMELOR) 10 MG/5ML solution Take 2 ml by mouth nightly (clarification as of 8/2/2012)- please give 30 day supply with 3 refills 1 Bottle 5    gabapentin (NEURONTIN) 250 MG/5ML solution Take 4 mL by mouth daily. (Patient taking differently: Take 6 mL by mouth daily.) 450 mL 3    Multiple Vitamin (MULTIVITAMIN PO) Take 1 tablet by mouth every morning Over The Counter      duloxetine (CYMBALTA) 60 MG capsule Take 60 mg by mouth every evening Managed by Dr. Beba Andrade       No current facility-administered medications for this visit. Allergies: Demerol; Pcn [penicillins]; Statins; Sulfa antibiotics; Vicodin [hydrocodone-acetaminophen]; Zetia [ezetimibe];  Adhesive tape; Glycerin; Percocet [oxycodone-acetaminophen]; and Meperidine  Past Medical History:   Diagnosis Date    Anemia     Angioedema 4/7/2014    APC (atrial premature contractions) 2/2012    Infrequent APC's-Dr Hai Schmitt Arrhythmia 4/7/2014    Arthritis     Brain aneurysm      referred to [facility] [place] Dr Jeanette Johns    CKD (chronic kidney disease)     Sees Dr. Phyllis Robles- \"stage 3\"    Depression with anxiety 2014    Dr. Beba Andrade managing xanax and cymbalta    [facility]     \"pre- diabetic\"\"no medication yet\" per pt on Psychiatric/Behavioral: Negative for confusion. The patient is nervous/anxious. Objective:      Physical Exam:  /62   Pulse 60   Resp 16   Wt 127 lb 6.4 oz (57.8 kg)   BMI 24.07 kg/m²   Wt Readings from Last 3 Encounters:   07/18/19 127 lb 6.4 oz (57.8 kg)   07/02/19 127 lb (57.6 kg)   01/21/19 124 lb 3.2 oz (56.3 kg)     Body mass index is 24.07 kg/m². Physical Exam   Constitutional: She is oriented to person, place, and time. She appears well-developed and well-nourished. HENT:   Head: Normocephalic and atraumatic. Eyes: Pupils are equal, round, and reactive to light. Conjunctivae are normal. Right eye exhibits no discharge. Left eye exhibits no discharge. Neck: Neck supple. No JVD present. No thyromegaly present. Cardiovascular: Normal rate, regular rhythm, normal heart sounds and intact distal pulses. Exam reveals no gallop and no friction rub. No murmur heard. Pulmonary/Chest: Effort normal and breath sounds normal. No respiratory distress. She has no wheezes. She has no rales. Abdominal: Soft. Bowel sounds are normal. She exhibits no distension. There is no tenderness. Musculoskeletal: She exhibits no edema or deformity. Neurological: She is alert and oriented to person, place, and time. Skin: Skin is warm and dry.    Psychiatric: Thought content normal.       DATA:  Lab Results   Component Value Date    CKTOTAL 49 08/07/2017    CKMB 0.9 05/09/2011    TROPONINI <0.006 05/09/2011     BNP:    Lab Results   Component Value Date    BNP 37 05/09/2011     PT/INR:  No results found for: PTINR  Lab Results   Component Value Date    LABA1C 6.2 11/28/2017    LABA1C 6.2 07/11/2011     Lab Results   Component Value Date    CHOL 262 02/22/2018    TRIG 428 02/22/2018    HDL 31 (A) 02/22/2018    LDLCALC see below 02/21/2017    LDLDIRECT 163 (H) 02/21/2017     Lab Results   Component Value Date    ALT 13 02/22/2018    AST 18 02/22/2018     TSH:  No results found for:

## 2019-07-22 ENCOUNTER — TELEPHONE (OUTPATIENT)
Dept: CARDIOLOGY CLINIC | Age: 74
End: 2019-07-22

## 2019-07-22 NOTE — TELEPHONE ENCOUNTER
Patient had serious reaction to electrodes. She is currently not wearing monitor and waiting for delivery of sensitive electrodes.  Patient will try those but may have to terminate early

## 2019-07-30 ENCOUNTER — TELEPHONE (OUTPATIENT)
Dept: CARDIOLOGY CLINIC | Age: 74
End: 2019-07-30

## 2019-08-08 PROCEDURE — 93228 REMOTE 30 DAY ECG REV/REPORT: CPT | Performed by: INTERNAL MEDICINE

## 2019-08-09 ENCOUNTER — TELEPHONE (OUTPATIENT)
Dept: CARDIOLOGY CLINIC | Age: 74
End: 2019-08-09

## 2019-08-09 NOTE — TELEPHONE ENCOUNTER
----- Message from Gilmar Vanegas MD sent at 8/8/2019  5:36 PM EDT -----  Cardiac monitoring done from July 18, 2019 to July 30, 2019 to evaluate palpitations. Multiple rhythm strips are available for review and are all consistent with normal sinus rhythm. No ectopy is noted. Average heart rate was 64 bpm.  Lowest rate was 51 and the fastest rate was 93 bpm.  Patient did not report any activities or symptoms for correlation. Conclusion: Cardiac monitoring negative for any significant arrhythmias.

## 2019-08-20 ENCOUNTER — TELEPHONE (OUTPATIENT)
Dept: CARDIOLOGY CLINIC | Age: 74
End: 2019-08-20

## 2019-08-20 NOTE — TELEPHONE ENCOUNTER
Pt states she has been having palpitations and would like to come in and see the Dr. Donna Lizama an appointment for 8/23/19.

## 2020-01-21 PROBLEM — N18.30 CHRONIC KIDNEY DISEASE, STAGE III (MODERATE) (HCC): Status: ACTIVE | Noted: 2020-01-21

## 2020-02-19 ENCOUNTER — TELEPHONE (OUTPATIENT)
Dept: CARDIOLOGY CLINIC | Age: 75
End: 2020-02-19

## 2020-02-19 ENCOUNTER — APPOINTMENT (OUTPATIENT)
Dept: GENERAL RADIOLOGY | Age: 75
End: 2020-02-19
Payer: MEDICARE

## 2020-02-19 ENCOUNTER — HOSPITAL ENCOUNTER (OUTPATIENT)
Age: 75
Setting detail: OBSERVATION
Discharge: NURSING, REHAB OR LONG-TERM CARE | End: 2020-02-20
Attending: EMERGENCY MEDICINE | Admitting: INTERNAL MEDICINE
Payer: MEDICARE

## 2020-02-19 ENCOUNTER — APPOINTMENT (OUTPATIENT)
Dept: CT IMAGING | Age: 75
End: 2020-02-19
Payer: MEDICARE

## 2020-02-19 LAB
ALBUMIN SERPL-MCNC: 3.9 GM/DL (ref 3.4–5)
ALP BLD-CCNC: 86 IU/L (ref 40–129)
ALT SERPL-CCNC: 18 U/L (ref 10–40)
ANION GAP SERPL CALCULATED.3IONS-SCNC: 11 MMOL/L (ref 4–16)
AST SERPL-CCNC: 25 IU/L (ref 15–37)
BACTERIA: ABNORMAL /HPF
BASOPHILS ABSOLUTE: 0.1 K/CU MM
BASOPHILS RELATIVE PERCENT: 0.6 % (ref 0–1)
BILIRUB SERPL-MCNC: 0.2 MG/DL (ref 0–1)
BILIRUBIN URINE: NEGATIVE MG/DL
BLOOD, URINE: NEGATIVE
BUN BLDV-MCNC: 20 MG/DL (ref 6–23)
CALCIUM SERPL-MCNC: 9 MG/DL (ref 8.3–10.6)
CHLORIDE BLD-SCNC: 96 MMOL/L (ref 99–110)
CLARITY: ABNORMAL
CO2: 31 MMOL/L (ref 21–32)
COLOR: YELLOW
CREAT SERPL-MCNC: 1.5 MG/DL (ref 0.6–1.1)
DIFFERENTIAL TYPE: ABNORMAL
DIGOXIN LEVEL: 1.1 NG/ML (ref 0.8–2)
DOSE AMOUNT: NORMAL
DOSE TIME: NORMAL
EKG ATRIAL RATE: 67 BPM
EKG ATRIAL RATE: 67 BPM
EKG DIAGNOSIS: NORMAL
EKG DIAGNOSIS: NORMAL
EKG P AXIS: 53 DEGREES
EKG P AXIS: 53 DEGREES
EKG P-R INTERVAL: 134 MS
EKG P-R INTERVAL: 134 MS
EKG Q-T INTERVAL: 358 MS
EKG Q-T INTERVAL: 358 MS
EKG QRS DURATION: 68 MS
EKG QRS DURATION: 68 MS
EKG QTC CALCULATION (BAZETT): 378 MS
EKG QTC CALCULATION (BAZETT): 378 MS
EKG R AXIS: -9 DEGREES
EKG R AXIS: -9 DEGREES
EKG T AXIS: 20 DEGREES
EKG T AXIS: 20 DEGREES
EKG VENTRICULAR RATE: 67 BPM
EKG VENTRICULAR RATE: 67 BPM
EOSINOPHILS ABSOLUTE: 0 K/CU MM
EOSINOPHILS RELATIVE PERCENT: 0 % (ref 0–3)
GFR AFRICAN AMERICAN: 41 ML/MIN/1.73M2
GFR NON-AFRICAN AMERICAN: 34 ML/MIN/1.73M2
GLUCOSE BLD-MCNC: 116 MG/DL (ref 70–99)
GLUCOSE, URINE: NEGATIVE MG/DL
HCT VFR BLD CALC: 42.1 % (ref 37–47)
HEMOGLOBIN: 12.5 GM/DL (ref 12.5–16)
IMMATURE NEUTROPHIL %: 1.3 % (ref 0–0.43)
KETONES, URINE: NEGATIVE MG/DL
LEUKOCYTE ESTERASE, URINE: ABNORMAL
LYMPHOCYTES ABSOLUTE: 2.1 K/CU MM
LYMPHOCYTES RELATIVE PERCENT: 19.4 % (ref 24–44)
MCH RBC QN AUTO: 28 PG (ref 27–31)
MCHC RBC AUTO-ENTMCNC: 29.7 % (ref 32–36)
MCV RBC AUTO: 94.4 FL (ref 78–100)
MONOCYTES ABSOLUTE: 0.8 K/CU MM
MONOCYTES RELATIVE PERCENT: 7.1 % (ref 0–4)
MUCUS: ABNORMAL HPF
NITRITE URINE, QUANTITATIVE: NEGATIVE
NUCLEATED RBC %: 0 %
PDW BLD-RTO: 15.3 % (ref 11.7–14.9)
PH, URINE: 8 (ref 5–8)
PLATELET # BLD: 226 K/CU MM (ref 140–440)
PMV BLD AUTO: 10.4 FL (ref 7.5–11.1)
POTASSIUM SERPL-SCNC: 4.1 MMOL/L (ref 3.5–5.1)
PROTEIN UA: NEGATIVE MG/DL
RBC # BLD: 4.46 M/CU MM (ref 4.2–5.4)
RBC URINE: 7 /HPF (ref 0–6)
SEGMENTED NEUTROPHILS ABSOLUTE COUNT: 7.8 K/CU MM
SEGMENTED NEUTROPHILS RELATIVE PERCENT: 71.6 % (ref 36–66)
SODIUM BLD-SCNC: 138 MMOL/L (ref 135–145)
SPECIFIC GRAVITY UA: 1.02 (ref 1–1.03)
SQUAMOUS EPITHELIAL: <1 /HPF
TOTAL IMMATURE NEUTOROPHIL: 0.14 K/CU MM
TOTAL NUCLEATED RBC: 0 K/CU MM
TOTAL PROTEIN: 6.8 GM/DL (ref 6.4–8.2)
TRICHOMONAS: ABNORMAL /HPF
TROPONIN T: <0.01 NG/ML
UROBILINOGEN, URINE: NORMAL MG/DL (ref 0.2–1)
WBC # BLD: 10.9 K/CU MM (ref 4–10.5)
WBC UA: 3 /HPF (ref 0–5)
YEAST: ABNORMAL /HPF

## 2020-02-19 PROCEDURE — 96372 THER/PROPH/DIAG INJ SC/IM: CPT

## 2020-02-19 PROCEDURE — 2580000003 HC RX 258: Performed by: NURSE PRACTITIONER

## 2020-02-19 PROCEDURE — 36415 COLL VENOUS BLD VENIPUNCTURE: CPT

## 2020-02-19 PROCEDURE — G0378 HOSPITAL OBSERVATION PER HR: HCPCS

## 2020-02-19 PROCEDURE — 84484 ASSAY OF TROPONIN QUANT: CPT

## 2020-02-19 PROCEDURE — 93005 ELECTROCARDIOGRAM TRACING: CPT | Performed by: EMERGENCY MEDICINE

## 2020-02-19 PROCEDURE — 70450 CT HEAD/BRAIN W/O DYE: CPT

## 2020-02-19 PROCEDURE — 71046 X-RAY EXAM CHEST 2 VIEWS: CPT

## 2020-02-19 PROCEDURE — 80162 ASSAY OF DIGOXIN TOTAL: CPT

## 2020-02-19 PROCEDURE — 6370000000 HC RX 637 (ALT 250 FOR IP): Performed by: NURSE PRACTITIONER

## 2020-02-19 PROCEDURE — 6360000002 HC RX W HCPCS: Performed by: NURSE PRACTITIONER

## 2020-02-19 PROCEDURE — 93010 ELECTROCARDIOGRAM REPORT: CPT | Performed by: INTERNAL MEDICINE

## 2020-02-19 PROCEDURE — 81001 URINALYSIS AUTO W/SCOPE: CPT

## 2020-02-19 PROCEDURE — 80053 COMPREHEN METABOLIC PANEL: CPT

## 2020-02-19 PROCEDURE — 99285 EMERGENCY DEPT VISIT HI MDM: CPT

## 2020-02-19 PROCEDURE — 85025 COMPLETE CBC W/AUTO DIFF WBC: CPT

## 2020-02-19 RX ORDER — POLYETHYLENE GLYCOL 3350 17 G/17G
17 POWDER, FOR SOLUTION ORAL DAILY PRN
Status: DISCONTINUED | OUTPATIENT
Start: 2020-02-19 | End: 2020-02-20 | Stop reason: HOSPADM

## 2020-02-19 RX ORDER — SODIUM CHLORIDE 0.9 % (FLUSH) 0.9 %
10 SYRINGE (ML) INJECTION PRN
Status: DISCONTINUED | OUTPATIENT
Start: 2020-02-19 | End: 2020-02-20 | Stop reason: HOSPADM

## 2020-02-19 RX ORDER — ASPIRIN 81 MG/1
81 TABLET ORAL EVERY MORNING
Status: DISCONTINUED | OUTPATIENT
Start: 2020-02-20 | End: 2020-02-20 | Stop reason: HOSPADM

## 2020-02-19 RX ORDER — DOXEPIN HYDROCHLORIDE 10 MG/1
10 CAPSULE ORAL NIGHTLY
Status: DISCONTINUED | OUTPATIENT
Start: 2020-02-19 | End: 2020-02-20 | Stop reason: HOSPADM

## 2020-02-19 RX ORDER — ALPRAZOLAM 0.5 MG/1
0.25 TABLET ORAL NIGHTLY PRN
Status: ON HOLD | COMMUNITY
End: 2020-02-20 | Stop reason: HOSPADM

## 2020-02-19 RX ORDER — PROMETHAZINE HYDROCHLORIDE 25 MG/1
12.5 TABLET ORAL EVERY 6 HOURS PRN
Status: DISCONTINUED | OUTPATIENT
Start: 2020-02-19 | End: 2020-02-20 | Stop reason: HOSPADM

## 2020-02-19 RX ORDER — SODIUM CHLORIDE 0.9 % (FLUSH) 0.9 %
10 SYRINGE (ML) INJECTION EVERY 12 HOURS SCHEDULED
Status: DISCONTINUED | OUTPATIENT
Start: 2020-02-19 | End: 2020-02-20 | Stop reason: HOSPADM

## 2020-02-19 RX ORDER — ASPIRIN 81 MG/1
81 TABLET, CHEWABLE ORAL DAILY
Status: DISCONTINUED | OUTPATIENT
Start: 2020-02-20 | End: 2020-02-19 | Stop reason: SDUPTHER

## 2020-02-19 RX ORDER — NORTRIPTYLINE HYDROCHLORIDE 10 MG/1
10 CAPSULE ORAL 2 TIMES DAILY
Status: DISCONTINUED | OUTPATIENT
Start: 2020-02-19 | End: 2020-02-19 | Stop reason: RX

## 2020-02-19 RX ORDER — ATORVASTATIN CALCIUM 40 MG/1
40 TABLET, FILM COATED ORAL NIGHTLY
Status: DISCONTINUED | OUTPATIENT
Start: 2020-02-19 | End: 2020-02-20 | Stop reason: HOSPADM

## 2020-02-19 RX ORDER — ATENOLOL 25 MG/1
25 TABLET ORAL DAILY
Status: DISCONTINUED | OUTPATIENT
Start: 2020-02-19 | End: 2020-02-20 | Stop reason: HOSPADM

## 2020-02-19 RX ORDER — FOLIC ACID 5 MG
15 CAPSULE ORAL DAILY
Status: DISCONTINUED | OUTPATIENT
Start: 2020-02-19 | End: 2020-02-20 | Stop reason: HOSPADM

## 2020-02-19 RX ORDER — ACETAMINOPHEN 650 MG/1
650 SUPPOSITORY RECTAL EVERY 6 HOURS PRN
Status: DISCONTINUED | OUTPATIENT
Start: 2020-02-19 | End: 2020-02-20 | Stop reason: HOSPADM

## 2020-02-19 RX ORDER — ACETAMINOPHEN 325 MG/1
650 TABLET ORAL EVERY 6 HOURS PRN
Status: DISCONTINUED | OUTPATIENT
Start: 2020-02-19 | End: 2020-02-20 | Stop reason: HOSPADM

## 2020-02-19 RX ORDER — NORTRIPTYLINE HYDROCHLORIDE 10 MG/1
10 CAPSULE ORAL NIGHTLY
Status: DISCONTINUED | OUTPATIENT
Start: 2020-02-19 | End: 2020-02-19

## 2020-02-19 RX ORDER — TRIAMTERENE AND HYDROCHLOROTHIAZIDE 37.5; 25 MG/1; MG/1
1 TABLET ORAL EVERY OTHER DAY
Status: DISCONTINUED | OUTPATIENT
Start: 2020-02-19 | End: 2020-02-20 | Stop reason: HOSPADM

## 2020-02-19 RX ORDER — ALPRAZOLAM 0.5 MG/1
0.5 TABLET ORAL 4 TIMES DAILY
Status: DISCONTINUED | OUTPATIENT
Start: 2020-02-19 | End: 2020-02-20 | Stop reason: HOSPADM

## 2020-02-19 RX ORDER — DIGOXIN 125 MCG
125 TABLET ORAL EVERY OTHER DAY
Status: DISCONTINUED | OUTPATIENT
Start: 2020-02-19 | End: 2020-02-20 | Stop reason: HOSPADM

## 2020-02-19 RX ORDER — ONDANSETRON 2 MG/ML
4 INJECTION INTRAMUSCULAR; INTRAVENOUS EVERY 6 HOURS PRN
Status: DISCONTINUED | OUTPATIENT
Start: 2020-02-19 | End: 2020-02-20 | Stop reason: HOSPADM

## 2020-02-19 RX ORDER — ALPRAZOLAM 1 MG/1
1 TABLET ORAL NIGHTLY
Status: ON HOLD | COMMUNITY
End: 2020-02-20 | Stop reason: HOSPADM

## 2020-02-19 RX ORDER — ALPRAZOLAM 0.5 MG/1
0.25 TABLET ORAL DAILY
Status: ON HOLD | COMMUNITY
End: 2020-02-20 | Stop reason: HOSPADM

## 2020-02-19 RX ORDER — GABAPENTIN 250 MG/5ML
250 SOLUTION ORAL NIGHTLY
Status: DISCONTINUED | OUTPATIENT
Start: 2020-02-19 | End: 2020-02-20 | Stop reason: HOSPADM

## 2020-02-19 RX ADMIN — ALPRAZOLAM 0.5 MG: 0.5 TABLET ORAL at 21:52

## 2020-02-19 RX ADMIN — ENOXAPARIN SODIUM 30 MG: 30 INJECTION SUBCUTANEOUS at 21:52

## 2020-02-19 RX ADMIN — SODIUM CHLORIDE, PRESERVATIVE FREE 10 ML: 5 INJECTION INTRAVENOUS at 21:52

## 2020-02-19 RX ADMIN — GABAPENTIN 250 MG: 250 SOLUTION ORAL at 21:52

## 2020-02-19 ASSESSMENT — PAIN DESCRIPTION - PAIN TYPE
TYPE: ACUTE PAIN
TYPE: ACUTE PAIN

## 2020-02-19 ASSESSMENT — PAIN SCALES - GENERAL
PAINLEVEL_OUTOF10: 0
PAINLEVEL_OUTOF10: 8

## 2020-02-19 ASSESSMENT — PAIN DESCRIPTION - LOCATION
LOCATION: HEAD
LOCATION: HEAD

## 2020-02-19 NOTE — ED PROVIDER NOTES
As physician assistant-in-triage, I performed a medical screening history and physical exam on this patient. HISTORY OF PRESENT ILLNESS  Saintclair Mckusick is a 66 y.o. female who presents for fatigue, left-sided neck pain that began 3 days ago and decreased sensation of left side of face that began around 11:30 AM today. Patient reports decreased sensation is still slightly there but not as significant. Patient denies focal weakness, gait changes, slurred speech, facial droop. PHYSICAL EXAM  /70   Pulse 65   Temp 98.4 °F (36.9 °C) (Oral)   Resp 18   Ht 5' 1\" (1.549 m)   Wt 127 lb (57.6 kg)   SpO2 98%   BMI 24.00 kg/m²     On exam, the patient appears well-hydrated, well-nourished, and in no acute distress. Mucous membranes are moist. Speech is clear. Breathing is unlabored. Skin is dry. Mental status is normal. Moves all extremities, and is without facial droop. PLAN:  Immediately spoke with Dr. Parker Aquino who will see the patient now and proceed with work-up as he sees fit. EKG initiated in triage. Please see the full history, physical exam, and final disposition note by the other provider in the main emergency department. Comment: Please note this report has been produced using speech recognition software and may contain errors related to that system including errors in grammar, punctuation, and spelling, as well as words and phrases that may be inappropriate. If there are any questions or concerns please feel free to contact the dictating provider for clarification.         Jad Mclean PA-C  02/19/20 [id number]

## 2020-02-19 NOTE — ED NOTES
Report called to Ifeoma Alexandra on [place]. All questions answered.      Steph Musa RN  02/19/20 [id number]

## 2020-02-19 NOTE — H&P
History and Physical      Name:  Amos Mello /Age/Sex: 1945  (66 y.o. female)   MRN & CSN:  [id number] & [id number] Admission Date/Time: 2020  2:46 PM   Location:  ED26/ED-26 PCP: Marisol Vallecillo MD       Admitting Physicians : Dr. Claude Mills is a 66 y.o.  female  who presents with Headache (left posterior head pain started ) and Numbness (left facial numbness only 1130)    Assessment and Plan:   Chest pain  Initial troponin negative. EKG with no ischemic changes  -Trend troponin   -Continue ASA , BB, and Statins  -Check lipid panel  -Echo   -Consult cardiology    Left Facial paralysis  # Headache  CT head with no intracranial abnormalities  -MRI brain  -Neuro check    Essential HTN  -Continue Maxzide and Atenolol    CAD  -Continue ASA and BB    Depression/Anxiety  -Continue Pamelor, Xanax, and Cymbalta    CKD 3  Creat at baseline at 1.5  -Dose meds renally    Arrhythmia  -Continue Digoxin  -Check digoxin level        DVT-PPX: Lovenox  Diet: Cardiac      Medications:   Medications:    Infusions:   PRN Meds:   No current facility-administered medications for this encounter.      Current Outpatient Medications:     triamterene-hydrochlorothiazide (MAXZIDE-25) 37.5-25 MG per tablet, Take 1 tablet by mouth every other day, Disp: 45 tablet, Rfl: 3    digoxin (LANOXIN) 125 MCG tablet, Take 1 tablet by mouth daily (Patient taking differently: Take 125 mcg by mouth every other day 1 whole tablet every day 1/2 tablet on off day), Disp: 90 tablet, Rfl: 3    atenolol (TENORMIN) 50 MG tablet, Take 0.5 tablets by mouth daily Dose decrease as of 4/15/2013, Disp: 45 tablet, Rfl: 3    Probiotic Product (PROBIOTIC-10 PO), Take 1 tablet by mouth, Disp: , Rfl:     Folic Acid 5 MG CAPS, Take 15 mg by mouth daily, Disp: , Rfl:     acetaminophen (TYLENOL) 325 MG tablet, Take 2 tablets by mouth every 6 hours as needed for Pain, Disp: 120 tablet, Rfl: 0    aspirin 81 MG EC tablet, Take 81 mg by insecurity:     Worry: None     Inability: None    Transportation needs:     Medical: None     Non-medical: None   Tobacco Use    Smoking status: Never Smoker    Smokeless tobacco: Never Used   Substance and Sexual Activity    Alcohol use: No    Drug use: No    Sexual activity: Never   Lifestyle    Physical activity:     Days per week: None     Minutes per session: None    Stress: None   Relationships    Social connections:     Talks on phone: None     Gets together: None     Attends Sikhism service: None     Active member of club or organization: None     Attends meetings of clubs or organizations: None     Relationship status: None    Intimate partner violence:     Fear of current or ex partner: None     Emotionally abused: None     Physically abused: None     Forced sexual activity: None   Other Topics Concern    None   Social History Narrative        Exercise - none    Retired college counselor       Electronically signed by NILAY Strickland CNP on 2/19/2020 at 6:44 PM    Patient seen and examined individually by me along with nurse practitioner. I have individually reviewed all patient's labs, radiology and EKG. Patient presented with chest pain, initial work-up ruled out active ACS. She also presented with left-sided scalp and facial numbness, will rule out stroke, ordered MRI, initial CT scan was negative. She is getting admitted to [address] for observation. I agree with above documentation by nurse practitioner.     Electronically signed by Thom Espinoza MD on 2/19/2020 at 8:32 PM

## 2020-02-19 NOTE — ED TRIAGE NOTES
Pt arrived to the ER per Dr. Chavo Meier; pt called the office with complaints of numbness on left side of pain and pain to the back part of the head; pain has been going on since 2/16; pt states that the numbness started around 1130; pt was advised by Dr. Chavo Meier office to come get checked out;

## 2020-02-19 NOTE — ED PROVIDER NOTES
Diabetes mellitus ([facility] [address]     \"pre- diabetic\"\"no medication yet\" per pt on 4/19/2018    Difficulty swallowing     \"Large Pills\"   [address]\"have fibromyalgia in all 18 trigger points and my hands an feet are swollen all the time- been to [facility] 4 times for this\"    GE reflux 2006    GERD (gastroesophageal reflux disease)     H/O cardiovascular stress test 2-3-12    2-3-12, EF 70 % normal study    H/O echocardiogram 2-3-12    2-3-12 EF 55%, normal test    Hemorrhoids     + colonoscopy 2011 Dr. New Moser History of 24 hour EKG monitoring 2-7-12    48 hour holter, predonimant rhythm is SInus rhythum, Max  and min HR 64, infrequent PVC's and APC's.  [place] (hard of hearing)     Bilateral Hearing Aids    Hx of cardiovascular stress test 6/12/2015    lexiscan-mild ischemia apical,EF70%    Hx of cardiovascular stress test 01/30/2019    Normal Lexiscan nuclear scintigraphic study suggestive of normal myocardial perfusion. Gated images demonstrate normal left ventricular systolic function with EF of 60 %.     Hyperlipidemia     Hypertension     follows with Dr Anton Lawson    IBS (irritable bowel syndrome) 7/7/2013    Dr. Elizabeth Beckman, IBS diet, immodium prn (2013)    [facility] Internal hemorrhoids 2011    Dr. Noah Grier Interstitial cystitis     Dr. Cornell Langford with cath    MVP (mitral valve prolapse)     Palpitations     Following with  Dr. Sweta Estrella, and on digoxin    Pap smear for cervical cancer screening     Dr. Dilip Mccurdy    [facility] Pulmonary nodule     9/14 CT chest 8mm nodule, no change, recheck 9/15    PVC (premature ventricular contraction) 2/2012    Infrequent PVC's-Dr. Mark Wooten    Right inguinal pain     referred from Dr. Redding to Dr. Martha Terry then PT ( no hernia)    Shortness of breath on exertion     Skin cancer of nose In Past    Dr. Juan Pablo Eduardo,  previously see Dr. Funmi Roblero Teeth missing     Upper And Lower    UTI (urinary tract infection) 03/2018    Vitamin D deficiency     Wears glasses     Wears partial dentures     Upper     Past Surgical History:   Procedure Laterality Date    BLADDER SURGERY  2000, 2008    Dr. Frandy Walsh- dilatation    [place]  2011    Dr. Uriel Baker [facility] in [place];Grade 1 internal hemorrhoids    DENTAL SURGERY      Teeth Extracted In Past    ENDOSCOPY, COLON, DIAGNOSTIC  In Past    EYE SURGERY Left 11/2017    Cataract With Lens Implant    HYSTERECTOMY, TOTAL ABDOMINAL  1/14    \"took everything    SHOULDER ARTHROSCOPY Right 11/2017    rotator cuff repair, SAd, distal clavicle excision, extensive debridement    SKIN CANCER EXCISION  In Past    Nose    TONSILLECTOMY  24's     Family History   Problem Relation Age of Onset    Mental Illness Son    Imer Huber Arthritis Mother     Hearing Loss Mother     Heart Disease Mother         \"Aneurysm In [place]"    Stroke Father     Heart Attack Father     Heart Disease Father         Heart Attack    Diabetes Father     Other Brother         \"Colon Polyps\"    Heart Disease Brother         \"Heart Surgery\"     Social History     Socioeconomic History    Marital status:      Spouse name: Not on file    Number of children: Not on file    Years of education: Not on file    Highest education level: Not on file   Occupational History    Not on file   Social Needs    Financial resource strain: Not on file    Food insecurity:     Worry: Not on file     Inability: Not on file    Transportation needs:     Medical: Not on file     Non-medical: Not on file   Tobacco Use    Smoking status: Never Smoker    Smokeless tobacco: Never Used   Substance and Sexual Activity    Alcohol use: No    Drug use: No    Sexual activity: Never   Lifestyle    Physical activity:     Days per week: Not on file     Minutes per session: Not on file    Stress: Not on file   Relationships    Social connections:     Talks on phone: Not on file     Gets together: gabapentin (NEURONTIN) 250 MG/5ML solution Take 4 mL by mouth daily. (Patient taking differently: Take 6 mL by mouth daily.) 450 mL 3    Multiple Vitamin (MULTIVITAMIN PO) Take 1 tablet by mouth every morning Over The Counter      duloxetine (CYMBALTA) 60 MG capsule Take 60 mg by mouth every evening Managed by Dr. Brock Henry   Allergen Reactions    Demerol Rash    Pcn [Penicillins] Rash    Statins      \"Causes Muscle Aches And Pains\"    Sulfa Antibiotics Rash    Vicodin [Hydrocodone-Acetaminophen]      \"Dizziness\"    Zetia [Ezetimibe] Palpitations and Other (See Comments)     Heart palpitations  \"Heart Palpitations\"    Adhesive Tape Other (See Comments) and Rash     Muscles aches    Glycerin      \"tongue swells\"    Percocet [Oxycodone-Acetaminophen] Other (See Comments)     \"hallucination\"    Meperidine Rash    Prednisone Palpitations       Nursing Notes Reviewed    Physical Exam:  Triage VS:    ED Triage Vitals [02/19/20 1447]   Enc Vitals Group      /70      Pulse 65      Resp 18      Temp 98.4 °F (36.9 °C)      Temp Source Oral      SpO2 98 %      Weight 127 lb (57.6 kg)      Height 5' 1\" (1.549 m)      Head Circumference       Peak Flow       Pain Score       Pain Loc       Pain Edu? Excl. in [address]? My pulse ox interpretation is - normal    General appearance:  No acute distress. Skin:  Warm. Dry. Eye:  Extraocular movements intact. Ears, nose, mouth and throat:  Oral mucosa moist   Neck:  Trachea midline. Extremity:  No swelling. Normal ROM     Heart:  Regular rate and rhythm, normal S1 & S2, no extra heart sounds. Perfusion:  intact  Respiratory:  Lungs clear to auscultation bilaterally. Respirations nonlabored. Abdominal:  Normal bowel sounds. Soft. Nontender. Non distended. Neurological:  Alert and oriented times 3.   Moves all extremities equally, cranial nerves intact, pupils are equal round and reactive to light, no drift, test of skew negative    I have reviewed and interpreted all of the currently available lab results from this visit (if applicable):  Results for orders placed or performed during the hospital encounter of 02/19/20   Comprehensive Metabolic Panel   Result Value Ref Range    Sodium 138 135 - 145 MMOL/L    Potassium 4.1 3.5 - 5.1 MMOL/L    Chloride 96 (L) 99 - 110 mMol/L    CO2 31 21 - 32 MMOL/L    BUN 20 6 - 23 MG/DL    CREATININE 1.5 (H) 0.6 - 1.1 MG/DL    Glucose 116 (H) 70 - 99 MG/DL    Calcium 9.0 8.3 - 10.6 MG/DL    Alb 3.9 3.4 - 5.0 GM/DL    Total Protein 6.8 6.4 - 8.2 GM/DL    Total Bilirubin 0.2 0.0 - 1.0 MG/DL    ALT 18 10 - 40 U/L    AST 25 15 - 37 IU/L    Alkaline Phosphatase 86 40 - 129 IU/L    GFR Non- 34 (L) >60 mL/min/1.73m2    GFR  41 (L) >60 mL/min/1.73m2    Anion Gap 11 4 - 16   CBC Auto Differential   Result Value Ref Range    WBC 10.9 (H) 4.0 - 10.5 K/CU MM    RBC 4.46 4.2 - 5.4 M/CU MM    Hemoglobin 12.5 12.5 - 16.0 GM/DL    Hematocrit 42.1 37 - 47 %    MCV 94.4 78 - 100 FL    MCH 28.0 27 - 31 PG    MCHC 29.7 (L) 32.0 - 36.0 %    RDW 15.3 (H) 11.7 - 14.9 %    Platelets 753 075 - 780 K/CU MM    MPV 10.4 7.5 - 11.1 FL    Differential Type AUTOMATED DIFFERENTIAL     Segs Relative 71.6 (H) 36 - 66 %    Lymphocytes % 19.4 (L) 24 - 44 %    Monocytes % 7.1 (H) 0 - 4 %    Eosinophils % 0.0 0 - 3 %    Basophils % 0.6 0 - 1 %    Segs Absolute 7.8 K/CU MM    Lymphocytes Absolute 2.1 K/CU MM    Monocytes Absolute 0.8 K/CU MM    Eosinophils Absolute 0.0 K/CU MM    Basophils Absolute 0.1 K/CU MM    Nucleated RBC % 0.0 %    Total Nucleated RBC 0.0 K/CU MM    Total Immature Neutrophil 0.14 K/CU MM    Immature Neutrophil % 1.3 (H) 0 - 0.43 %   Troponin   Result Value Ref Range    Troponin T <0.010 <0.01 NG/ML   Urinalysis with Microscopic   Result Value Ref Range    Color, UA YELLOW YELLOW    Clarity, UA HAZY (A) CLEAR    Glucose, Urine NEGATIVE NEGATIVE MG/DL    Bilirubin Urine NEGATIVE NEGATIVE MG/DL    Ketones, Urine NEGATIVE NEGATIVE MG/DL    Specific Gravity, UA 1.017 1.001 - 1.035    Blood, Urine NEGATIVE NEGATIVE    pH, Urine 8.0 5.0 - 8.0    Protein, UA NEGATIVE NEGATIVE MG/DL    Urobilinogen, Urine NORMAL 0.2 - 1.0 MG/DL    Nitrite Urine, Quantitative NEGATIVE NEGATIVE    Leukocyte Esterase, Urine SMALL (A) NEGATIVE    RBC, UA 7 (H) 0 - 6 /HPF    WBC, UA 3 0 - 5 /HPF    Bacteria, UA OCCASIONAL (A) NEGATIVE /HPF    Yeast, UA RARE /HPF    Squam Epithel, UA <1 /HPF    Mucus, UA RARE (A) NEGATIVE HPF    Trichomonas, UA NONE SEEN NONE SEEN /HPF   EKG 12 Lead   Result Value Ref Range    Ventricular Rate 67 BPM    Atrial Rate 67 BPM    P-R Interval 134 ms    QRS Duration 68 ms    Q-T Interval 358 ms    QTc Calculation (Bazett) 378 ms    P Axis 53 degrees    R Axis -9 degrees    T Axis 20 degrees    Diagnosis       Normal sinus rhythm with sinus arrhythmia  Nonspecific T wave abnormality  Abnormal ECG  When compared with ECG of 19-NOV-2017 11:33,  premature atrial complexes are no longer present  QT has shortened     EKG 12 Lead   Result Value Ref Range    Ventricular Rate 67 BPM    Atrial Rate 67 BPM    P-R Interval 134 ms    QRS Duration 68 ms    Q-T Interval 358 ms    QTc Calculation (Bazett) 378 ms    P Axis 53 degrees    R Axis -9 degrees    T Axis 20 degrees    Diagnosis       Normal sinus rhythm with sinus arrhythmia  Nonspecific T wave abnormality  Abnormal ECG  When compared with ECG of 19-NOV-2017 11:33,  premature atrial complexes are no longer present  QT has shortened        Radiographs (if obtained):  Radiologist's Report Reviewed:  Ct Head Wo Contrast    Result Date: 2/19/2020  EXAMINATION: CT OF THE HEAD WITHOUT CONTRAST  2/19/2020 3:18 pm TECHNIQUE: CT of the head was performed without the administration of intravenous contrast. Dose modulation, iterative reconstruction, and/or weight based adjustment of the mA/kV was utilized to reduce the radiation dose to as low as reasonably achievable. COMPARISON: 11/19/2017 HISTORY: ORDERING SYSTEM PROVIDED HISTORY: headache, facial tingling TECHNOLOGIST PROVIDED HISTORY: Has a \"code stroke\" or \"stroke alert\" been called? ->No Reason for exam:->headache, facial tingling Reason for Exam: headache, facial tingling; dizzy Acuity: Acute Type of Exam: Initial Additional signs and symptoms: none Relevant Medical/Surgical History: none FINDINGS: BRAIN/VENTRICLES: There is no acute intracranial hemorrhage, mass effect or midline shift. No abnormal extra-axial fluid collection. The gray-white differentiation is maintained without evidence of an acute infarct. There is no evidence of hydrocephalus. ORBITS: The visualized portion of the orbits demonstrate no acute abnormality. SINUSES: The visualized paranasal sinuses and mastoid air cells demonstrate no acute abnormality. SOFT TISSUES/SKULL:  No acute abnormality of the visualized skull or soft tissues. No acute intracranial abnormality. EKG (if obtained): (All EKG's are interpreted by myself in the absence of a cardiologist)      MDM:  Patient here with multiple complaints, does not appear to be consistent with a central neurologic process, given her history work-up was initiated, CT head does not have acute findings she has a slight renal insufficiency slight leukocytosis no UTI EKG does not have ST elevation given combination of symptoms I am to bring her into the hospital, hospitalist notified    Clinical Impression:  1. Facial tingling    2. Chest pain, unspecified type    3. Acute nonintractable headache, unspecified headache type      Disposition referral (if applicable):  No follow-up provider specified.   Disposition medications (if applicable):  New Prescriptions    No medications on file     ED Provider Disposition Time  DISPOSITION Decision To Admit 02/19/2020 05:00:04 PM      Comment: Please note this report has been produced using speech recognition software and may contain errors

## 2020-02-20 ENCOUNTER — APPOINTMENT (OUTPATIENT)
Dept: NUCLEAR MEDICINE | Age: 75
End: 2020-02-20
Payer: MEDICARE

## 2020-02-20 ENCOUNTER — APPOINTMENT (OUTPATIENT)
Dept: MRI IMAGING | Age: 75
End: 2020-02-20
Payer: MEDICARE

## 2020-02-20 VITALS
DIASTOLIC BLOOD PRESSURE: 74 MMHG | TEMPERATURE: 98.1 F | RESPIRATION RATE: 16 BRPM | BODY MASS INDEX: 23.98 KG/M2 | SYSTOLIC BLOOD PRESSURE: 158 MMHG | HEIGHT: 61 IN | HEART RATE: 67 BPM | OXYGEN SATURATION: 100 % | WEIGHT: 127 LBS

## 2020-02-20 LAB
ANION GAP SERPL CALCULATED.3IONS-SCNC: 13 MMOL/L (ref 4–16)
BUN BLDV-MCNC: 20 MG/DL (ref 6–23)
CALCIUM SERPL-MCNC: 9.1 MG/DL (ref 8.3–10.6)
CHLORIDE BLD-SCNC: 98 MMOL/L (ref 99–110)
CHOLESTEROL: 234 MG/DL
CO2: 30 MMOL/L (ref 21–32)
CREAT SERPL-MCNC: 1.4 MG/DL (ref 0.6–1.1)
EKG ATRIAL RATE: 68 BPM
EKG DIAGNOSIS: NORMAL
EKG P AXIS: 66 DEGREES
EKG P-R INTERVAL: 142 MS
EKG Q-T INTERVAL: 416 MS
EKG QRS DURATION: 70 MS
EKG QTC CALCULATION (BAZETT): 442 MS
EKG R AXIS: 45 DEGREES
EKG T AXIS: 20 DEGREES
EKG VENTRICULAR RATE: 68 BPM
GFR AFRICAN AMERICAN: 44 ML/MIN/1.73M2
GFR NON-AFRICAN AMERICAN: 37 ML/MIN/1.73M2
GLUCOSE BLD-MCNC: 150 MG/DL (ref 70–99)
HCT VFR BLD CALC: 44 % (ref 37–47)
HDLC SERPL-MCNC: 32 MG/DL
HEMOGLOBIN: 13.2 GM/DL (ref 12.5–16)
LDL CHOLESTEROL DIRECT: 152 MG/DL
LV EF: 58 %
LV EF: 60 %
LVEF MODALITY: NORMAL
LVEF MODALITY: NORMAL
MCH RBC QN AUTO: 28.1 PG (ref 27–31)
MCHC RBC AUTO-ENTMCNC: 30 % (ref 32–36)
MCV RBC AUTO: 93.6 FL (ref 78–100)
PDW BLD-RTO: 15.1 % (ref 11.7–14.9)
PLATELET # BLD: 191 K/CU MM (ref 140–440)
PMV BLD AUTO: 10 FL (ref 7.5–11.1)
POTASSIUM SERPL-SCNC: 4.1 MMOL/L (ref 3.5–5.1)
RBC # BLD: 4.7 M/CU MM (ref 4.2–5.4)
SODIUM BLD-SCNC: 141 MMOL/L (ref 135–145)
TRIGL SERPL-MCNC: 446 MG/DL
TROPONIN T: <0.01 NG/ML
WBC # BLD: 10.5 K/CU MM (ref 4–10.5)

## 2020-02-20 PROCEDURE — 83721 ASSAY OF BLOOD LIPOPROTEIN: CPT

## 2020-02-20 PROCEDURE — G0378 HOSPITAL OBSERVATION PER HR: HCPCS

## 2020-02-20 PROCEDURE — 93005 ELECTROCARDIOGRAM TRACING: CPT | Performed by: NURSE PRACTITIONER

## 2020-02-20 PROCEDURE — 6370000000 HC RX 637 (ALT 250 FOR IP): Performed by: PHYSICIAN ASSISTANT

## 2020-02-20 PROCEDURE — 80061 LIPID PANEL: CPT

## 2020-02-20 PROCEDURE — 6360000002 HC RX W HCPCS: Performed by: INTERNAL MEDICINE

## 2020-02-20 PROCEDURE — 6370000000 HC RX 637 (ALT 250 FOR IP): Performed by: NURSE PRACTITIONER

## 2020-02-20 PROCEDURE — 85027 COMPLETE CBC AUTOMATED: CPT

## 2020-02-20 PROCEDURE — 93306 TTE W/DOPPLER COMPLETE: CPT

## 2020-02-20 PROCEDURE — 93010 ELECTROCARDIOGRAM REPORT: CPT | Performed by: INTERNAL MEDICINE

## 2020-02-20 PROCEDURE — 78452 HT MUSCLE IMAGE SPECT MULT: CPT

## 2020-02-20 PROCEDURE — 70551 MRI BRAIN STEM W/O DYE: CPT

## 2020-02-20 PROCEDURE — A9500 TC99M SESTAMIBI: HCPCS | Performed by: INTERNAL MEDICINE

## 2020-02-20 PROCEDURE — 94761 N-INVAS EAR/PLS OXIMETRY MLT: CPT

## 2020-02-20 PROCEDURE — 99213 OFFICE O/P EST LOW 20 MIN: CPT | Performed by: INTERNAL MEDICINE

## 2020-02-20 PROCEDURE — 80048 BASIC METABOLIC PNL TOTAL CA: CPT

## 2020-02-20 PROCEDURE — 93017 CV STRESS TEST TRACING ONLY: CPT

## 2020-02-20 PROCEDURE — 3430000000 HC RX DIAGNOSTIC RADIOPHARMACEUTICAL: Performed by: INTERNAL MEDICINE

## 2020-02-20 PROCEDURE — 36415 COLL VENOUS BLD VENIPUNCTURE: CPT

## 2020-02-20 RX ORDER — ALPRAZOLAM 0.25 MG/1
0.25 TABLET ORAL ONCE
Status: COMPLETED | OUTPATIENT
Start: 2020-02-20 | End: 2020-02-20

## 2020-02-20 RX ORDER — ALPRAZOLAM 0.25 MG/1
0.25 TABLET ORAL NIGHTLY PRN
Status: DISCONTINUED | OUTPATIENT
Start: 2020-02-20 | End: 2020-02-20

## 2020-02-20 RX ADMIN — Medication 10 MILLICURIE: at 07:00

## 2020-02-20 RX ADMIN — ATENOLOL 25 MG: 25 TABLET ORAL at 16:39

## 2020-02-20 RX ADMIN — Medication 30 MILLICURIE: at 12:00

## 2020-02-20 RX ADMIN — REGADENOSON 0.4 MG: 0.08 INJECTION, SOLUTION INTRAVENOUS at 11:00

## 2020-02-20 RX ADMIN — ALPRAZOLAM 0.5 MG: 0.5 TABLET ORAL at 16:39

## 2020-02-20 RX ADMIN — ALPRAZOLAM 0.25 MG: 0.25 TABLET ORAL at 05:09

## 2020-02-20 ASSESSMENT — PAIN SCALES - GENERAL: PAINLEVEL_OUTOF10: 0

## 2020-02-20 NOTE — DISCHARGE SUMMARY
Discharge Summary    Name:  Marquez Cavazos /Age/Sex: 1945  (66 y.o. female)   MRN & CSN:  [id number] & [id number] Admission Date/Time: 2020  2:46 PM   Attending:  Guy Morillo,* Discharging Physician: Jared Mercado MD     Hospital Course:   Marquez Cavazos is a 66 y.o.  female  who presents with chest pain. She had negative troponins. S he was seen by cardiology. She went for stress test which was negative. She will be discharged home at this time. MRI was ordered. No acute abnormalities noted. The patient expressed appropriate understanding of and agreement with the discharge recommendations, medications, and plan. Consults this admission:  [address]    Discharge Instruction:   Follow up appointments: none  Primary care physician:  within 2 weeks    Diet:  regular diet   Activity: activity as tolerated  Disposition: Discharged to:   [x]Home, [][facility], []SNF, []Acute Rehab, []Hospice   Condition on discharge: Stable    Discharge Medications:      [address] Medication Instructions MSB:[id number]    Printed on:20 0376   Medication Information                      acetaminophen (TYLENOL) 325 MG tablet  Take 2 tablets by mouth every 6 hours as needed for Pain             ALPRAZolam (XANAX) 0.5 MG tablet  Take 0.25 mg by mouth daily.               aspirin 81 MG EC tablet  Take 81 mg by mouth every morning Over The Counter             atenolol (TENORMIN) 50 MG tablet  Take 0.5 tablets by mouth daily Dose decrease as of 4/15/2013             Cholecalciferol (VITAMIN D) 2000 UNITS CAPS capsule  Take 2,000 Units by mouth daily             clobetasol (TEMOVATE) 0.05 % cream  Apply twice daily             Cyanocobalamin (VITAMIN B-12 PO)  Take by mouth every morning Over The Counter             digoxin (LANOXIN) 125 MCG tablet  Take 1 tablet by mouth daily             duloxetine (CYMBALTA) 60 MG capsule  Take 60 mg by mouth Daily with supper Managed alert, oriented x 4. Affect appropriate.     BMP/CBC  Recent Labs     02/19/20  1440 02/20/20  0537    141   K 4.1 4.1   CL 96* 98*   CO2 31 30   BUN 20 20   CREATININE 1.5* 1.4*   WBC 10.9* 10.5   HCT 42.1 44.0    191         Discharge Time of 35 minutes    Electronically signed by Etta Young MD on 2/20/2020 at 3:23 PM

## 2020-02-20 NOTE — PROGRESS NOTES
Medication History  [facility]    Patient Name: Gabby Andres 1945     Medication history has been completed by: Stacie Schulz CPhT    Source(s) of information: patient, insurance claims, retail pharmacy and [address] [place] [address]     Primary Care Physician: Deanna Garcias MD     Pharmacy: [address] as of 02/19/2020 - Review Complete 02/19/2020   Allergen Reaction Noted    Demerol Rash     Pcn [penicillins] Rash 12/20/2011    Statins      Sulfa antibiotics Rash     Vicodin [hydrocodone-acetaminophen]  11/13/2017    Zetia [ezetimibe] Palpitations and Other (See Comments) 07/25/2014    Adhesive tape Other (See Comments) and Rash 06/02/2009    Glycerin  04/19/2018    Percocet [oxycodone-acetaminophen] Other (See Comments) 04/19/2018    Meperidine Rash 12/20/2011    Prednisone Palpitations 01/21/2020        Prior to Admission medications    Medication Sig Start Date End Date Taking? Authorizing Provider   Gonzalo Vaz) 0.5 MG tablet Take 0.25 mg by mouth daily. Yes Historical Provider, MD Gonzalo Vaz) 0.5 MG tablet Take 0.25 mg by mouth nightly as needed for Sleep. Yes Historical Provider, MD Gonzalo Vaz) 1 MG tablet Take 1 mg by mouth nightly. Yes Historical Provider, MD   triamterene-hydrochlorothiazide (MAXZIDE-25) 37.5-25 MG per tablet Take 1 tablet by mouth every other day 1/21/20  Yes Alla Bowling MD   digoxin (LANOXIN) 125 MCG tablet Take 1 tablet by mouth daily  Patient taking differently: Take by mouth See Admin Instructions 02/19/2020 Patient states she alternates 62.5 mcg and 125 mcg every other day.  Today's dose 62.5 mcg 5/21/19  Yes José Miguel Taylor MD   atenolol (TENORMIN) 50 MG tablet Take 0.5 tablets by mouth daily Dose decrease as of 4/15/2013 1/29/19  Yes José Miguel Taylor MD   Probiotic Product (PROBIOTIC-10 PO) Take 1 tablet by mouth daily    Yes Historical Provider, MD   aspirin 81 MG EC tablet Take 81 mg by mouth every morning Over The Counter Alprazolam dosage clarified. Patient takes 0.25 mg 11 am, 0.25 b/t 3-4 pm and 0.25 mg  in the middle of the night if needed. 1 mg at HS  Digoxin dosage clarified as patient alternates b/t 62.5 mcg and 125 mcg every other day. Gabapentin soln clarified of 300 mg at HS  Nortriptyline soln clarified of 5 mg at HS  Maxzide therapy 1 tablet every other day, dose taken today, next dose  02/21/2020    Comments:  Medication list reviewed with patient and insurance claims verified with retail pharmacy. Patient very specific with medications and dosing regimen. Maxzide therapy 1 tablet every other day, dose taken today, next dose  02/21/2020  lprazolam dosage clarified. Patient takes 0.25 mg 11 am, 0.25 b/t 3-4 pm and 0.25 mg in the middle of the night if needed. 1 mg at HS  Digoxin dosage clarified as patient alternates b/t 62.5 mcg and 125 mcg every other day. Dose today 62.5 mcg, next dose 125 mcg on 02/20/2020.     To my knowledge the above medication history is accurate as of 2/19/2020 7:13 PM.   Kym Hale CPhT   2/19/2020 7:13 PM

## 2020-02-20 NOTE — PROGRESS NOTES
Patient is not in her room at this time to access her neuros. I will evaluate her once she returns .

## 2020-02-20 NOTE — CARE COORDINATION
Attempted to see patient for dc planning and she is off the unit at this time. 2:32 PM  Chart reviewed, in to see pt for dc planning. Introduced self and board updated. Pt was admitted for CP. States she lives at [address] [place] [address], is independent with all needs and will return at discharge. Explained she follows with PCP, has insurance with affordable RX co-pays and reliable transportation per herself or [place]. Discussed HHC and pt declined [address] need nor any other discharge needs. CM remains available if needs arise.

## 2020-02-20 NOTE — PLAN OF CARE
Problem: Pain:  Goal: Pain level will decrease  Description  Pain level will decrease  Outcome: Ongoing  Goal: Control of acute pain  Description  Control of acute pain  Outcome: Ongoing  Goal: Control of chronic pain  Description  Control of chronic pain  Outcome: Ongoing     Problem: HEMODYNAMIC STATUS  Goal: Patient has stable vital signs and fluid balance  Outcome: Ongoing     Problem: ACTIVITY INTOLERANCE/IMPAIRED MOBILITY  Goal: Mobility/activity is maintained at optimum level for patient  Outcome: Ongoing     Problem: COMMUNICATION IMPAIRMENT  Goal: Ability to express needs and understand communication  Outcome: Ongoing

## 2020-02-20 NOTE — CONSULTS
Name:  Cydney Kwok /Age/Sex: 1945  (66 y.o. female)   MRN & CSN:  [id number] & [id number] Admission Date/Time: 2020  2:46 PM   Location:  [facility] PCP: Bala Santana, [address] Day: 2          Referring physician:  Yuly Loera MD         Reason for consultation:  CP        Thanks for referral.    Information source: patient    CC;  CP      HPI:   Thank you for involving me in taking  care of Cydney Kwok who  is a 66 y. o.year  Old female  Presents with  Recurrent mid sternal moderate nonexertional  CP,  Initial dolan neg also c/o Lt sided headache as well. Past medical history:    has a past medical history of Anemia, Angioedema, APC (atrial premature contractions), Arrhythmia, Arthritis, Brain aneurysm, CKD (chronic kidney disease), Depression with anxiety, Diabetes mellitus ([address] Difficulty swallowing, Fibromyalgia, GE reflux, GERD (gastroesophageal reflux disease), H/O cardiovascular stress test, H/O echocardiogram, Hemorrhoids, History of 24 hour EKG monitoring, [place] (hard of hearing), Hx of cardiovascular stress test, Hx of cardiovascular stress test, Hyperlipidemia, Hypertension, IBS (irritable bowel syndrome), Internal hemorrhoids, Interstitial cystitis, MVP (mitral valve prolapse), Palpitations, Pap smear for cervical cancer screening, Pulmonary nodule, PVC (premature ventricular contraction), Right inguinal pain, Shortness of breath on exertion, Skin cancer of nose, Teeth missing, UTI (urinary tract infection), Vitamin D deficiency, Wears glasses, and Wears partial dentures. Past surgical history:   has a past surgical history that includes Bladder surgery (, ); Skin cancer excision (In Past); eye surgery (Left, 2017); Dental surgery; Colonoscopy (); Endoscopy, colon, diagnostic (In Past); Tonsillectomy (); Shoulder arthroscopy (Right, 2017);  Breast biopsy (Left, [address] [place] [address] Pkwy); and Hysterectomy, total abdominal (1/14). Social History:   reports that she has never smoked. She has never used smokeless tobacco. She reports that she does not drink alcohol or use drugs. Family history:  family history includes Arthritis in her mother; Diabetes in her father; Hearing Loss in her mother; Heart Attack in her father; Heart Disease in her brother, father, and mother; Mental Illness in her son; Other in her brother; Stroke in her father.     Allergies   Allergen Reactions    Demerol Rash    Pcn [Penicillins] Rash    Statins      \"Causes Muscle Aches And Pains\"    Sulfa Antibiotics Rash    Vicodin [Hydrocodone-Acetaminophen]      \"Dizziness\"    Zetia [Ezetimibe] Palpitations and Other (See Comments)     Heart palpitations  \"Heart Palpitations\"    Adhesive Tape Other (See Comments) and Rash     Muscles aches    Glycerin      \"tongue swells\"    Percocet [Oxycodone-Acetaminophen] Other (See Comments)     \"hallucination\"    Meperidine Rash    Prednisone Palpitations       acetaminophen (TYLENOL) tablet 650 mg, Q6H PRN  ALPRAZolam (XANAX) tablet 0.5 mg, 4x Daily  aspirin EC tablet 81 mg, QAM  atenolol (TENORMIN) tablet 25 mg, Daily  vitamin D CAPS 2,000 Units, Daily  digoxin (LANOXIN) tablet 125 mcg, Every Other Day  triamterene-hydrochlorothiazide (MAXZIDE-25) 37.5-25 MG per tablet 1 tablet, Every Other Day  gabapentin (NEURONTIN) 250 MG/5ML solution 714 mg, Nightly  Folic Acid CAPS 15 mg, Daily  sodium chloride flush 0.9 % injection 10 mL, 2 times per day  sodium chloride flush 0.9 % injection 10 mL, PRN  acetaminophen (TYLENOL) tablet 650 mg, Q6H PRN  acetaminophen (TYLENOL) suppository 650 mg, Q6H PRN  polyethylene glycol (GLYCOLAX) packet 17 g, Daily PRN  promethazine (PHENERGAN) tablet 12.5 mg, Q6H PRN  ondansetron (ZOFRAN) injection 4 mg, Q6H PRN  atorvastatin (LIPITOR) tablet 40 mg, Nightly  enoxaparin (LOVENOX) injection 30 mg, Daily  [Held by provider] doxepin (SINEQUAN) capsule 10 mg, Nightly      Current Facility-Administered Medications   Medication Dose Route Frequency Provider Last Rate Last Dose    acetaminophen (TYLENOL) tablet 650 mg  650 mg Oral Q6H PRN NILAY Strickland CNP        ALPRAZolam Dinorah Cruz) tablet 0.5 mg  0.5 mg Oral 4x Daily NILAY Strickland - CNP   0.5 mg at 02/19/20 2152    aspirin EC tablet 81 mg  81 mg Oral QAM NILAY Strickland CNP        atenolol (TENORMIN) tablet 25 mg  25 mg Oral Daily Fatou Valnetin, APRN - CNP        vitamin D CAPS 2,000 Units  2,000 Units Oral Daily NILAY Strickland CNP        digoxin (LANOXIN) tablet 125 mcg  125 mcg Oral Every Other Day NILAY Strickland CNP        triamterene-hydrochlorothiazide (MAXZIDE-25) 37.5-25 MG per tablet 1 tablet  1 tablet Oral Every Other Day NILAY Strickland CNP        gabapentin (NEURONTIN) 250 MG/5ML solution 250 mg  250 mg Oral Nightly NILAY Strickland - CNP   250 mg at 95/74/28 7951    Folic Acid CAPS 15 mg  15 mg Oral Daily NILAY Strickland CNP        sodium chloride flush 0.9 % injection 10 mL  10 mL Intravenous 2 times per day NILAY Strickland CNP   10 mL at 02/19/20 2152    sodium chloride flush 0.9 % injection 10 mL  10 mL Intravenous PRN NILAY Strickland CNP        acetaminophen (TYLENOL) tablet 650 mg  650 mg Oral Q6H PRN NILAY Strickland - KALIE        acetaminophen (TYLENOL) suppository 650 mg  650 mg Rectal Q6H PRN NILAY Strickland - KALIE        polyethylene glycol (GLYCOLAX) packet 17 g  17 g Oral Daily PRN NILAY Strickland CNP        promethazine (PHENERGAN) tablet 12.5 mg  12.5 mg Oral Q6H PRN NILAY Strickland - KALIE        ondansetron (ZOFRAN) injection 4 mg  4 mg Intravenous Q6H PRN NILAY Strickland CNP        atorvastatin (LIPITOR) tablet 40 mg  40 mg Oral Nightly NILAY Strickland CNP        enoxaparin (LOVENOX) injection 30 mg  30 mg Subcutaneous Daily NILAY Strickland CNP   30 mg at 02/19/20 4927   

## 2020-03-13 ENCOUNTER — OFFICE VISIT (OUTPATIENT)
Dept: CARDIOLOGY CLINIC | Age: 75
End: 2020-03-13
Payer: MEDICARE

## 2020-03-13 VITALS
DIASTOLIC BLOOD PRESSURE: 78 MMHG | SYSTOLIC BLOOD PRESSURE: 102 MMHG | BODY MASS INDEX: 25.07 KG/M2 | WEIGHT: 132.8 LBS | HEART RATE: 70 BPM | HEIGHT: 61 IN

## 2020-03-13 PROCEDURE — 99213 OFFICE O/P EST LOW 20 MIN: CPT | Performed by: INTERNAL MEDICINE

## 2020-03-13 RX ORDER — DIGOXIN 125 MCG
125 TABLET ORAL DAILY
Qty: 90 TABLET | Refills: 3 | Status: CANCELLED | OUTPATIENT
Start: 2020-03-13

## 2020-03-13 RX ORDER — DIGOXIN 125 MCG
125 TABLET ORAL DAILY
Qty: 90 TABLET | Refills: 3 | Status: SHIPPED | OUTPATIENT
Start: 2020-03-13 | End: 2021-06-07 | Stop reason: SDUPTHER

## 2020-03-13 RX ORDER — ATENOLOL 50 MG/1
25 TABLET ORAL DAILY
Qty: 45 TABLET | Refills: 3 | Status: SHIPPED | OUTPATIENT
Start: 2020-03-13 | End: 2020-12-23 | Stop reason: SDUPTHER

## 2020-03-13 NOTE — PROGRESS NOTES
CARDIOLOGY NOTE      3/13/2020    RE: Zulema Chauhan  (1945)                               TO:  Dr. Yasmin Sahu MD            Logan Anand is a 66 y.o. female who was seen today for management of  htn                 Here for fu on hosp visit                   HPI:   Patient is here for    - Hypertension,is  well controlled, pt is  compliant with medicines. - Hyperlipidimea, lipids are in acceptable range. Pt  is  compliant with medicines                    The patient does not have cardiac complaints  C/o pain in lt side of head , no CP, SOB.     Zulema Chauhan has the following history recorded in care path:  Patient Active Problem List    Diagnosis Date Noted    HTN (hypertension) 04/15/2013     Priority: High    Palpitations      Priority: High    Fibromyalgia 03/21/2012     Priority: High    Anemia 12/29/2011     Priority: High    Depression with anxiety 12/20/2011     Priority: High    Rash 04/15/2013     Priority: Medium    Fatigue 07/08/2012     Priority: Medium    Insomnia 01/31/2012     Priority: Medium    Vitamin D deficiency 12/29/2011     Priority: Medium    Lymphedema 02/21/2017     Priority: Low    CKD (chronic kidney disease) stage 3, GFR 30-59 ml/min ([facility]) 09/13/2016     Priority: Low    Benign essential hypertension 09/13/2016     Priority: Low    Coronary artery disease involving native coronary artery of native heart without angina pectoris 09/13/2016     Priority: Low    Anxiety 09/13/2016     Priority: Low    Mixed hyperlipidemia 11/19/2015     Priority: Low    CKD (chronic kidney disease) 11/19/2015     Priority: Low    Arrhythmia 04/07/2014     Priority: Low    Depression 04/07/2014     Priority: Low    Anxiety 04/07/2014     Priority: Low    Chronic interstitial cystitis 04/07/2014     Priority: Low    Angioedema 04/07/2014     Priority: Low    IBS (irritable bowel syndrome) 07/07/2013     Priority: Low    Chronic kidney disease, stage III (moderate) ([facility] [address] 01/21/2020    Acute cystitis without hematuria 05/09/2018    Brain aneurysm 01/25/2018    Depression, major, recurrent, moderate (HCC) 01/25/2018    Edema of both lower legs due to peripheral venous insufficiency 01/25/2018    Altered mental status 11/19/2017    Complete tear of right rotator cuff     Labral tear of long head of right biceps tendon     DJD of right AC (acromioclavicular) joint     Right rotator cuff tear 01/01/2017     Current Outpatient Medications   Medication Sig Dispense Refill    atenolol (TENORMIN) 50 MG tablet Take 0.5 tablets by mouth daily Dose decrease as of 4/15/2013 45 tablet 3    triamterene-hydrochlorothiazide (MAXZIDE-25) 37.5-25 MG per tablet Take 1 tablet by mouth every other day 45 tablet 3    digoxin (LANOXIN) 125 MCG tablet Take 1 tablet by mouth daily (Patient taking differently: Take by mouth See Admin Instructions 02/19/2020 Patient states she alternates 62.5 mcg and 125 mcg every other day. Today's dose 62.5 mcg) 90 tablet 3    Probiotic Product (PROBIOTIC-10 PO) Take 1 tablet by mouth daily       Folic Acid 5 MG CAPS Take 15 mg by mouth daily      acetaminophen (TYLENOL) 325 MG tablet Take 2 tablets by mouth every 6 hours as needed for Pain 120 tablet 0    aspirin 81 MG EC tablet Take 81 mg by mouth every morning Over The Counter      Cyanocobalamin (VITAMIN B-12 PO) Take by mouth every morning Over The Counter      ALPRAZolam (XANAX) 0.5 MG tablet Take 0.25 mg by mouth daily.        clobetasol (TEMOVATE) 0.05 % cream Apply twice daily 60 g 3    Cholecalciferol (VITAMIN D) 2000 UNITS CAPS capsule Take 2,000 Units by mouth daily 90 capsule 3    nortriptyline (PAMELOR) 10 MG/5ML solution Take 2 ml by mouth nightly (clarification as of 8/2/2012)- please give 30 day supply with 3 refills (Patient taking differently: Take 5 mg by mouth nightly Take 2 ml by mouth nightly (clarification as of 8/2/2012)- please give 30 day supply with 3 refills) 1 Bottle 5    gabapentin (NEURONTIN) 250 MG/5ML solution Take 4 mL by mouth daily. (Patient taking differently: Take 300 mg by mouth nightly. Take 6 mL by mouth daily.) 450 mL 3    Multiple Vitamin (MULTIVITAMIN PO) Take 1 tablet by mouth every morning Over The Counter      duloxetine (CYMBALTA) 60 MG capsule Take 60 mg by mouth Daily with supper Managed by Dr. Colten Shrestha       No current facility-administered medications for this visit. Allergies: Demerol; Pcn [penicillins]; Statins; Sulfa antibiotics; Vicodin [hydrocodone-acetaminophen]; Zetia [ezetimibe]; Adhesive tape; Glycerin; Percocet [oxycodone-acetaminophen]; Meperidine; and Prednisone  Past Medical History:   Diagnosis Date    Anemia     Angioedema 4/7/2014    APC (atrial premature contractions) 2/2012    Infrequent APC's-Dr Artemio Rivera Arrhythmia 4/7/2014    Arthritis     Brain aneurysm      referred to [facility] Dr Siena Lea    CKD (chronic kidney disease)     Sees Dr. Tequila Wilkerson- \"stage 3\"    Depression with anxiety 2014    Dr. Colten Shrestha managing xanax and cymbalta    Diabetes mellitus ([facility] [address]     \"pre- diabetic\"\"no medication yet\" per pt on 4/19/2018    Difficulty swallowing     \"Large Pills\"   [address]\"have fibromyalgia in all 18 trigger points and my hands an feet are swollen all the time- been to [facility] 4 times for this\"    GE reflux 2006    GERD (gastroesophageal reflux disease)     H/O cardiovascular stress test 2-3-12    2-3-12, EF 70 % normal study    H/O echocardiogram 2-3-12    2-3-12 EF 55%, normal test    Hemorrhoids     + colonoscopy 2011 Dr. Cezar Nava History of 24 hour EKG monitoring 2-7-12    48 hour holter, predonimant rhythm is SInus rhythum, Max  and min HR 64, infrequent PVC's and APC's.     [place] (hard of hearing)     Bilateral Hearing Aids    Hx of cardiovascular stress test 6/12/2015    lexiscan-mild ischemia apical,EF70%    Hx of \"Aneurysm In Heart\"    Stroke Father     Heart Attack Father     Heart Disease Father         Heart Attack    Diabetes Father     Other Brother         \"Colon Polyps\"    Heart Disease Brother         \"Heart Surgery\"     Social History     Tobacco Use    Smoking status: Never Smoker    Smokeless tobacco: Never Used   Substance Use Topics    Alcohol use: No      Review of Systems:    Constitutional: Negative for diaphoresis and fatigue  Psychological:Negative for anxiety or depression  HENT: Negative for headaches, nasal congestion, sinus pain or vertigo  Eyes: Negative for visual disturbance. Endocrine: Negative for polydipsia/polyuria  Respiratory: Negative for shortness of breath  Cardiovascular: Negative for chest pain, dyspnea on exertion, claudication, edema, irregular heartbeat, murmur, palpitations or shortness of breath  Gastrointestinal: Negative for abdominal pain or heartburn  Genito-Urinary: Negative for urinary frequency/urgency  Musculoskeletal: Negative for muscle pain, muscular weakness, negative for pain in arm and leg or swelling in foot and leg  Neurological: Negative for dizziness, headaches, memory loss, numbness/tingling, visual changes, syncope  Dermatological: Negative for rash    Objective:  /78   Pulse 70   Ht 5' 1\" (1.549 m)   Wt 132 lb 12.8 oz (60.2 kg)   BMI 25.09 kg/m²   Wt Readings from Last 3 Encounters:   03/13/20 132 lb 12.8 oz (60.2 kg)   02/19/20 127 lb (57.6 kg)   01/21/20 129 lb 6.4 oz (58.7 kg)     Body mass index is 25.09 kg/m². GENERAL - Alert, oriented, pleasant, in no apparent distress. EYES: No jaundice, no conjunctival pallor. SKIN: It is warm & dry. No rashes. No Echhymosis    HEENT - No clinically significant abnormalities seen. Neck - Supple. No jugular venous distention noted. No carotid bruits. Cardiovascular - Normal S1 and S2 without obvious murmur or gallop. Extremities - No cyanosis, clubbing, or significant edema.     Pulmonary - No respiratory distress. No wheezes or rales. Abdomen - No masses, tenderness, or organomegaly. Musculoskeletal - No significant edema. No joint deformities. No muscle wasting. Neurologic - Cranial nerves II through XII are grossly intact. There were no gross focal neurologic abnormalities. Lab Review   Lab Results   Component Value Date    CKTOTAL 49 08/07/2017    CKMB 0.9 05/09/2011    TROPONINT <0.010 02/19/2020     BNP:    Lab Results   Component Value Date    BNP 37 05/09/2011     PT/INR:    Lab Results   Component Value Date    INR 0.96 07/06/2015     Lab Results   Component Value Date    LABA1C 6.2 11/28/2017    LABA1C 6.2 07/11/2011     Lab Results   Component Value Date    WBC 10.5 02/20/2020    HCT 44.0 02/20/2020    MCV 93.6 02/20/2020     02/20/2020     Lab Results   Component Value Date    CHOL 234 (H) 02/20/2020    TRIG 446 (H) 02/20/2020    HDL 32 (L) 02/20/2020    LDLCALC see below 02/21/2017    LDLDIRECT 152 (H) 02/20/2020     Lab Results   Component Value Date    ALT 18 02/19/2020    AST 25 02/19/2020     BMP:    Lab Results   Component Value Date     02/20/2020    K 4.1 02/20/2020    CL 98 02/20/2020    CO2 30 02/20/2020    BUN 20 02/20/2020    CREATININE 1.4 02/20/2020     CMP:   Lab Results   Component Value Date     02/20/2020    K 4.1 02/20/2020    CL 98 02/20/2020    CO2 30 02/20/2020    BUN 20 02/20/2020    PROT 6.8 02/19/2020    PROT 6.8 10/23/2012     TSH:    Lab Results   Component Value Date    TSHHS 3.855 09/11/2013       Impression:    No diagnosis found.    Patient Active Problem List   Diagnosis Code    Depression with anxiety F41.8    Vitamin D deficiency E55.9    Anemia D64.9    Insomnia G47.00    Fibromyalgia M79.7    Fatigue R53.83    Rash R21    HTN (hypertension) I10    Palpitations R00.2    IBS (irritable bowel syndrome) K58.9    Arrhythmia I49.9    Depression F32.9    Anxiety F41.9    Chronic interstitial cystitis N30.10   

## 2020-04-01 ENCOUNTER — TELEPHONE (OUTPATIENT)
Dept: CARDIOLOGY CLINIC | Age: 75
End: 2020-04-01

## 2020-06-03 ENCOUNTER — TELEPHONE (OUTPATIENT)
Dept: CARDIOLOGY CLINIC | Age: 75
End: 2020-06-03

## 2020-06-08 ENCOUNTER — TELEPHONE (OUTPATIENT)
Dept: CARDIOLOGY CLINIC | Age: 75
End: 2020-06-08

## 2020-06-08 NOTE — TELEPHONE ENCOUNTER
Spoke with patient, she reports B/P today of 142/85. She has not taken her medication yet.  Will take and report later in the day

## 2020-06-15 ENCOUNTER — TELEPHONE (OUTPATIENT)
Dept: CARDIOLOGY CLINIC | Age: 75
End: 2020-06-15

## 2020-06-15 NOTE — TELEPHONE ENCOUNTER
Patient called stating that she spoke with Joss Reese regarding digoxin and was advised to call back if she has anymore issues, she is having issues. Please call her back at ph# [phone].

## 2020-07-17 ENCOUNTER — HOSPITAL ENCOUNTER (OUTPATIENT)
Age: 75
Setting detail: SPECIMEN
Discharge: HOME | End: 2020-07-17
Payer: MEDICARE

## 2020-07-17 LAB
ALBUMIN SERPL-MCNC: 4.1 GM/DL (ref 3.4–5)
ANION GAP SERPL CALCULATED.3IONS-SCNC: 11 MMOL/L (ref 4–16)
BUN BLDV-MCNC: 22 MG/DL (ref 6–23)
CALCIUM SERPL-MCNC: 9.1 MG/DL (ref 8.3–10.6)
CHLORIDE BLD-SCNC: 100 MMOL/L (ref 99–110)
CO2: 32 MMOL/L (ref 21–32)
CREAT SERPL-MCNC: 1.5 MG/DL (ref 0.6–1.1)
GFR AFRICAN AMERICAN: 41 ML/MIN/1.73M2
GFR NON-AFRICAN AMERICAN: 34 ML/MIN/1.73M2
GLUCOSE BLD-MCNC: 118 MG/DL (ref 70–99)
MAGNESIUM: 2.4 MG/DL (ref 1.8–2.4)
PHOSPHORUS: 4.1 MG/DL (ref 2.5–4.9)
POTASSIUM SERPL-SCNC: 4.3 MMOL/L (ref 3.5–5.1)
SODIUM BLD-SCNC: 143 MMOL/L (ref 135–145)

## 2020-07-17 PROCEDURE — 82040 ASSAY OF SERUM ALBUMIN: CPT

## 2020-07-17 PROCEDURE — 83735 ASSAY OF MAGNESIUM: CPT

## 2020-07-17 PROCEDURE — 80048 BASIC METABOLIC PNL TOTAL CA: CPT

## 2020-07-17 PROCEDURE — 36415 COLL VENOUS BLD VENIPUNCTURE: CPT

## 2020-07-17 PROCEDURE — 84100 ASSAY OF PHOSPHORUS: CPT

## 2020-08-13 ENCOUNTER — HOSPITAL ENCOUNTER (OUTPATIENT)
Age: 75
Setting detail: SPECIMEN
Discharge: HOME | End: 2020-08-13
Payer: MEDICARE

## 2020-08-13 PROCEDURE — U0002 COVID-19 LAB TEST NON-CDC: HCPCS

## 2020-08-21 ENCOUNTER — HOSPITAL ENCOUNTER (OUTPATIENT)
Age: 75
Setting detail: SPECIMEN
Discharge: HOME | End: 2020-08-21
Payer: MEDICARE

## 2020-08-21 LAB
ALBUMIN SERPL-MCNC: 4.2 GM/DL (ref 3.4–5)
ALP BLD-CCNC: 84 IU/L (ref 40–128)
ALT SERPL-CCNC: 13 U/L (ref 10–40)
ANION GAP SERPL CALCULATED.3IONS-SCNC: 9 MMOL/L (ref 4–16)
AST SERPL-CCNC: 18 IU/L (ref 15–37)
BILIRUB SERPL-MCNC: 0.4 MG/DL (ref 0–1)
BUN BLDV-MCNC: 26 MG/DL (ref 6–23)
CALCIUM SERPL-MCNC: 9 MG/DL (ref 8.3–10.6)
CHLORIDE BLD-SCNC: 100 MMOL/L (ref 99–110)
CHOLESTEROL: 197 MG/DL
CO2: 33 MMOL/L (ref 21–32)
CREAT SERPL-MCNC: 1.5 MG/DL (ref 0.6–1.1)
ESTIMATED AVERAGE GLUCOSE: 131 MG/DL
GFR AFRICAN AMERICAN: 41 ML/MIN/1.73M2
GFR NON-AFRICAN AMERICAN: 34 ML/MIN/1.73M2
GLUCOSE BLD-MCNC: 117 MG/DL (ref 70–99)
HBA1C MFR BLD: 6.2 % (ref 4.2–6.3)
HDLC SERPL-MCNC: 29 MG/DL
LDL CHOLESTEROL DIRECT: 125 MG/DL
POTASSIUM SERPL-SCNC: 4.1 MMOL/L (ref 3.5–5.1)
SODIUM BLD-SCNC: 142 MMOL/L (ref 135–145)
TOTAL PROTEIN: 6 GM/DL (ref 6.4–8.2)
TRIGL SERPL-MCNC: 308 MG/DL
TSH HIGH SENSITIVITY: 2.93 UIU/ML (ref 0.27–4.2)
VITAMIN D 25-HYDROXY: 51.76 NG/ML

## 2020-08-21 PROCEDURE — 82306 VITAMIN D 25 HYDROXY: CPT

## 2020-08-21 PROCEDURE — 84443 ASSAY THYROID STIM HORMONE: CPT

## 2020-08-21 PROCEDURE — 83036 HEMOGLOBIN GLYCOSYLATED A1C: CPT

## 2020-08-21 PROCEDURE — 80053 COMPREHEN METABOLIC PANEL: CPT

## 2020-08-21 PROCEDURE — 80061 LIPID PANEL: CPT

## 2020-08-21 PROCEDURE — 83721 ASSAY OF BLOOD LIPOPROTEIN: CPT

## 2020-08-21 PROCEDURE — 36415 COLL VENOUS BLD VENIPUNCTURE: CPT

## 2020-09-18 ENCOUNTER — HOSPITAL ENCOUNTER (OUTPATIENT)
Age: 75
Setting detail: SPECIMEN
Discharge: HOME | End: 2020-09-18
Payer: MEDICARE

## 2020-09-18 LAB
FERRITIN: 186 NG/ML (ref 15–150)
HCT VFR BLD CALC: 41.8 % (ref 37–47)
HEMOGLOBIN: 12.7 GM/DL (ref 12.5–16)
IRON: 47 UG/DL (ref 37–145)
MCH RBC QN AUTO: 28.4 PG (ref 27–31)
MCHC RBC AUTO-ENTMCNC: 30.4 % (ref 32–36)
MCV RBC AUTO: 93.5 FL (ref 78–100)
PDW BLD-RTO: 15.6 % (ref 11.7–14.9)
PLATELET # BLD: 193 K/CU MM (ref 140–440)
PMV BLD AUTO: 10.7 FL (ref 7.5–11.1)
RBC # BLD: 4.47 M/CU MM (ref 4.2–5.4)
WBC # BLD: 10 K/CU MM (ref 4–10.5)

## 2020-09-18 PROCEDURE — 83540 ASSAY OF IRON: CPT

## 2020-09-18 PROCEDURE — 85027 COMPLETE CBC AUTOMATED: CPT

## 2020-09-18 PROCEDURE — 36415 COLL VENOUS BLD VENIPUNCTURE: CPT

## 2020-09-18 PROCEDURE — 82728 ASSAY OF FERRITIN: CPT

## 2020-10-15 ENCOUNTER — TELEPHONE (OUTPATIENT)
Dept: CARDIOLOGY CLINIC | Age: 75
End: 2020-10-15

## 2020-10-15 PROBLEM — N18.4 CHRONIC KIDNEY DISEASE, STAGE IV (SEVERE) (HCC): Status: ACTIVE | Noted: 2020-10-15

## 2020-10-15 NOTE — TELEPHONE ENCOUNTER
Patient would like a call back from [facility] to discuss her Feb 2020 test results. Reason is that Dr. Monet Rivas told her that her test in Feb show that she has CHF and she wants to know why Dr. Yadiel Pittman has not brought this to her attention.

## 2020-10-19 ENCOUNTER — TELEPHONE (OUTPATIENT)
Dept: CARDIOLOGY CLINIC | Age: 75
End: 2020-10-19

## 2020-10-19 NOTE — TELEPHONE ENCOUNTER
Reviewed with Rafael Santos NP. Explained to patient diastolic dysfunction on last echo. Dr Noé Mcgowan is treating appropriately.

## 2020-10-28 ENCOUNTER — TELEPHONE (OUTPATIENT)
Dept: CARDIOLOGY CLINIC | Age: 75
End: 2020-10-28

## 2020-10-28 NOTE — TELEPHONE ENCOUNTER
Patient upset about the diagnosis of CHF  And was asked by one her other providers   If she is on potassium and if that is something   She can take OTC ,still upset about not knowing   She had CHF

## 2020-11-09 ENCOUNTER — HOSPITAL ENCOUNTER (OUTPATIENT)
Dept: GENERAL RADIOLOGY | Age: 75
Discharge: HOME | End: 2020-11-09
Payer: MEDICARE

## 2020-11-09 ENCOUNTER — OFFICE VISIT (OUTPATIENT)
Dept: CARDIOLOGY CLINIC | Age: 75
End: 2020-11-09
Payer: MEDICARE

## 2020-11-09 ENCOUNTER — HOSPITAL ENCOUNTER (OUTPATIENT)
Age: 75
Discharge: HOME | End: 2020-11-09
Payer: MEDICARE

## 2020-11-09 VITALS
WEIGHT: 132.2 LBS | SYSTOLIC BLOOD PRESSURE: 118 MMHG | HEART RATE: 80 BPM | HEIGHT: 61 IN | BODY MASS INDEX: 24.96 KG/M2 | DIASTOLIC BLOOD PRESSURE: 72 MMHG

## 2020-11-09 PROCEDURE — 71046 X-RAY EXAM CHEST 2 VIEWS: CPT

## 2020-11-09 PROCEDURE — 93000 ELECTROCARDIOGRAM COMPLETE: CPT | Performed by: INTERNAL MEDICINE

## 2020-11-09 PROCEDURE — 99214 OFFICE O/P EST MOD 30 MIN: CPT | Performed by: INTERNAL MEDICINE

## 2020-11-09 NOTE — PROGRESS NOTES
CARDIOLOGY NOTE      11/9/2020    RE: Deo Arteaga  (1945)                               TO:  Dr. Franklin Maravilla MD            Adam Billingsley is a 66 y.o. female who was seen today for management of  htn                                    HPI:                   The patient does not have cardiac complaints  Patient also seen  for    - Hypertension,is  well controlled, pt is  compliant with medicines  - Hyperlipidimea, importance of hyperlipidimea discussed with pt.        Deo Arteaga has the following history recorded in care path:  Patient Active Problem List    Diagnosis Date Noted    HTN (hypertension) 04/15/2013     Priority: High    Palpitations      Priority: High    Fibromyalgia 03/21/2012     Priority: High    Anemia 12/29/2011     Priority: High    Depression with anxiety 12/20/2011     Priority: High    Rash 04/15/2013     Priority: Medium    Fatigue 07/08/2012     Priority: Medium    Insomnia 01/31/2012     Priority: Medium    Vitamin D deficiency 12/29/2011     Priority: Medium    Lymphedema 02/21/2017     Priority: Low    CKD (chronic kidney disease) stage 3, GFR 30-59 ml/min 09/13/2016     Priority: Low    Benign essential hypertension 09/13/2016     Priority: Low    Coronary artery disease involving native coronary artery of native heart without angina pectoris 09/13/2016     Priority: Low    Anxiety 09/13/2016     Priority: Low    Mixed hyperlipidemia 11/19/2015     Priority: Low    CKD (chronic kidney disease) 11/19/2015     Priority: Low    Arrhythmia 04/07/2014     Priority: Low    Depression 04/07/2014     Priority: Low    Chronic interstitial cystitis 04/07/2014     Priority: Low    Angioedema 04/07/2014     Priority: Low    IBS (irritable bowel syndrome) 07/07/2013     Priority: Low    Chronic kidney disease, stage IV (severe) ([facility] [address] 10/15/2020    Chronic kidney disease, stage III (moderate) 01/21/2020    Acute cystitis without hematuria 05/09/2018    Brain aneurysm 01/25/2018    Depression, major, recurrent, moderate (HCC) 01/25/2018    Edema of both lower legs due to peripheral venous insufficiency 01/25/2018    Altered mental status 11/19/2017    Complete tear of right rotator cuff     Labral tear of long head of right biceps tendon     DJD of right AC (acromioclavicular) joint     Right rotator cuff tear 01/01/2017     Current Outpatient Medications   Medication Sig Dispense Refill    furosemide (LASIX) 20 MG tablet Take 1.5 tablets by mouth daily 45 tablet 3    furosemide (LASIX) 20 MG tablet Take 1 tablet by mouth daily as needed (as needed for swelling) (Patient taking differently: Take 20 mg by mouth 2 times daily as needed (as needed for swelling) ) 30 tablet 3    atenolol (TENORMIN) 50 MG tablet Take 0.5 tablets by mouth daily Dose decrease as of 4/15/2013 45 tablet 3    digoxin (LANOXIN) 125 MCG tablet Take 1 tablet by mouth daily (Patient taking differently: Take 125 mcg by mouth daily Pt takes one tablet one day alt with 1/2 tablet po qod) 90 tablet 3    triamterene-hydrochlorothiazide (MAXZIDE-25) 37.5-25 MG per tablet Take 1 tablet by mouth every other day 45 tablet 3    Probiotic Product (PROBIOTIC-10 PO) Take 1 tablet by mouth daily       Folic Acid 5 MG CAPS Take 15 mg by mouth daily      acetaminophen (TYLENOL) 325 MG tablet Take 2 tablets by mouth every 6 hours as needed for Pain 120 tablet 0    aspirin 81 MG EC tablet Take 81 mg by mouth every morning Over The Counter      ALPRAZolam (XANAX) 0.5 MG tablet Take 0.25 mg by mouth.  1/4 tablet tid and 1 mg po qhs      clobetasol (TEMOVATE) 0.05 % cream Apply twice daily 60 g 3    nortriptyline (PAMELOR) 10 MG/5ML solution Take 2 ml by mouth nightly (clarification as of 8/2/2012)- please give 30 day supply with 3 refills (Patient taking differently: Take 5 mg by mouth nightly Take 2.5 ml by mouth nightly (clarification as of 8/2/2012)- please give 30 day supply with 3 refills) 1 Bottle 5    gabapentin (NEURONTIN) 250 MG/5ML solution Take 4 mL by mouth daily. (Patient taking differently: Take 300 mg by mouth nightly. Take 5 mL by mouth daily.) 450 mL 3    Multiple Vitamin (MULTIVITAMIN PO) Take 1 tablet by mouth every morning Over The Counter      duloxetine (CYMBALTA) 60 MG capsule Take 60 mg by mouth Daily with supper Managed by Dr. Chyna Peacock      potassium chloride (KLOR-CON M) 20 MEQ extended release tablet Take 1 tablet by mouth daily (Patient not taking: Reported on 11/9/2020) 90 tablet 3     No current facility-administered medications for this visit. Allergies: Demerol; Pcn [penicillins]; Statins; Sulfa antibiotics; Vicodin [hydrocodone-acetaminophen]; Zetia [ezetimibe]; Adhesive tape; Glycerin; Percocet [oxycodone-acetaminophen];  Meperidine; and Prednisone  Past Medical History:   Diagnosis Date    Anemia     Angioedema 4/7/2014    APC (atrial premature contractions) 2/2012    Infrequent APC's-Dr Jaimee Mancilla Arrhythmia 4/7/2014    Arthritis     Brain aneurysm      referred to [facility] Dr Latia Avery    CKD (chronic kidney disease)     Sees Dr. Amy Ballard- \"stage 3\"    Depression with anxiety 2014    Dr. Chyna Peacock managing xanax and cymbalta    [facility]     \"pre- diabetic\"\"no medication yet\" per pt on 4/19/2018    Difficulty swallowing     \"Large Pills\"   [address]\"have fibromyalgia in all 18 trigger points and my hands an feet are swollen all the time- been to [facility] 4 times for this\"    GE reflux 2006    GERD (gastroesophageal reflux disease)     H/O cardiovascular stress test 2-3-12    2-3-12, EF 70 % normal study    H/O echocardiogram 2-3-12    2-3-12 EF 55%, normal test    Hemorrhoids     + colonoscopy 2011 Dr. Robert Hutton History of 24 hour EKG monitoring 2-7-12    48 hour holter, predonimant rhythm is SInus rhythum, Max  and min HR 64, infrequent As reviewed   Family History   Problem Relation Age of Onset    Mental Illness Son     Arthritis Mother     Hearing Loss Mother     Heart Disease Mother         \"Aneurysm In [place]"    Stroke Father     Heart Attack Father     Heart Disease Father         Heart Attack    Diabetes Father     Other Brother         \"Colon Polyps\"    Heart Disease Brother         \"Heart Surgery\"     Social History     Tobacco Use    Smoking status: Never Smoker    Smokeless tobacco: Never Used   Substance Use Topics    Alcohol use: No      Review of Systems:    Constitutional: Negative for diaphoresis and fatigue  Psychological:Negative for anxiety or depression  HENT: Negative for headaches, nasal congestion, sinus pain or vertigo  Eyes: Negative for visual disturbance.    Endocrine: Negative for polydipsia/polyuria  Respiratory: Negative for shortness of breath  Cardiovascular: Negative for chest pain, dyspnea on exertion, claudication, edema, irregular heartbeat, murmur, palpitations or shortness of breath  Gastrointestinal: Negative for abdominal pain or heartburn  Genito-Urinary: Negative for urinary frequency/urgency  Musculoskeletal: Negative for muscle pain, muscular weakness, negative for pain in arm and leg or swelling in foot and leg  Neurological: Negative for dizziness, headaches, memory loss, numbness/tingling, visual changes, syncope  Dermatological: Negative for rash    Objective:    Vitals:    11/09/20 0911   BP: 118/72   Pulse: 80   Weight: 132 lb 3.2 oz (60 kg)   Height: 5' 1\" (1.549 m)     /72   Pulse 80   Ht 5' 1\" (1.549 m)   Wt 132 lb 3.2 oz (60 kg)   BMI 24.98 kg/m²     Patient-Reported Vitals 7/21/2020   Patient-Reported Weight 125 lb   Patient-Reported Height -   Patient-Reported Systolic 333   Patient-Reported Diastolic 72   Patient-Reported Pulse 70        Wt Readings from Last 3 Encounters:   11/09/20 132 lb 3.2 oz (60 kg)   10/15/20 131 lb 12.8 oz (59.8 kg)   03/13/20 132 lb 12.8 oz (60.2 kg)     Body mass index is 24.98 kg/m². GENERAL - Alert, oriented, pleasant, in no apparent distress. EYES: No jaundice, no conjunctival pallor. SKIN: It is warm & dry. No rashes. No Echhymosis    HEENT - No clinically significant abnormalities seen. Neck - Supple. No jugular venous distention noted. No carotid bruits. Cardiovascular - Normal S1 and S2 without obvious murmur or gallop. Extremities - No cyanosis, clubbing, or significant edema. Pulmonary - No respiratory distress. No wheezes or rales. Abdomen - No masses, tenderness, or organomegaly. Musculoskeletal - No significant edema. No joint deformities. No muscle wasting. Neurologic - Cranial nerves II through XII are grossly intact. There were no gross focal neurologic abnormalities.     Lab Review   Lab Results   Component Value Date    CKTOTAL 49 08/07/2017    CKMB 0.9 05/09/2011    TROPONINT <0.010 02/19/2020     BNP:    Lab Results   Component Value Date    BNP 37 05/09/2011     PT/INR:    Lab Results   Component Value Date    INR 0.96 07/06/2015     Lab Results   Component Value Date    LABA1C 6.2 08/21/2020    LABA1C 6.2 11/28/2017     Lab Results   Component Value Date    WBC 10.0 09/18/2020    HCT 41.8 09/18/2020    MCV 93.5 09/18/2020     09/18/2020     Lab Results   Component Value Date    CHOL 197 08/21/2020    TRIG 308 (H) 08/21/2020    HDL 29 (L) 08/21/2020    LDLCALC see below 02/21/2017    LDLDIRECT 125 (H) 08/21/2020     Lab Results   Component Value Date    ALT 14 10/09/2020    AST 18 10/09/2020     BMP:    Lab Results   Component Value Date     10/09/2020    K 5.0 10/09/2020    CL 96 10/09/2020    CO2 37 10/09/2020    BUN 41 10/09/2020    CREATININE 1.9 10/09/2020     CMP:   Lab Results   Component Value Date     10/09/2020    K 5.0 10/09/2020    CL 96 10/09/2020    CO2 37 10/09/2020    BUN 41 10/09/2020    PROT 6.7 10/09/2020    PROT 6.8 10/23/2012     TSH:    Lab Results   Component Value Date [facility] 2.930 08/21/2020         Impression:    1. Essential hypertension       Patient Active Problem List   Diagnosis Code    Depression with anxiety F41.8    Vitamin D deficiency E55.9    Anemia D64.9    Insomnia G47.00    Fibromyalgia M79.7    Fatigue R53.83    Rash R21    HTN (hypertension) I10    Palpitations R00.2    IBS (irritable bowel syndrome) K58.9    Arrhythmia I49.9    Depression F32.9    Chronic interstitial cystitis N30.10    Angioedema T78. 3XXA    Mixed hyperlipidemia E78.2    CKD (chronic kidney disease) N18.9    CKD (chronic kidney disease) stage 3, GFR 30-59 ml/min N18.30    Benign essential hypertension I10    Coronary artery disease involving native coronary artery of native heart without angina pectoris I25.10    Anxiety F41.9    Lymphedema I89.0    Complete tear of right rotator cuff M75.121    Labral tear of long head of right biceps tendon S46.111A    DJD of right AC (acromioclavicular) joint M19.011    Altered mental status R41.82    Brain aneurysm I67.1    Depression, major, recurrent, moderate (HCC) F33.1    Edema of both lower legs due to peripheral venous insufficiency I87.2, R60.9    Right rotator cuff tear M75. 80    Acute cystitis without hematuria N30.00    Chronic kidney disease, stage III (moderate) N18.30    Chronic kidney disease, stage IV (severe) (HCC) N18.4       Assessment & Plan:    -  Hypertension: Patients blood pressure is normal. Patient is advised about low sodium diet. Present medical regimen will not be changed. - normal cardiac dolan earlier         - CKD stable      -  LIPID MANAGEMENT:  Importance of lipid levels discussed with patient   and patient was given dietary advice. NCEP- ATP III guidelines reviewed with patient. -   Changes  in medicines made:  No                         - has no CHF       - LE swelling  On comp socks  ? lymphedema        Leno Saucedo MD    [facility] [place]

## 2020-11-09 NOTE — LETTER
[address] [facility]  1945  [id number]    Have you had any Chest Pain - {Blank single:19197::\"Yes\",\"No\"}  If Yes DO EKG - How does it feel - {Blank single:12879::\"Tightness\",\"Heaviness\",\"Sharp Pain\",\"Dull Ache\"}   How long does the pain last - {Blank single:19197::\"seconds\",\"minutes\",\"hours\"}   How long have you been having the pain - {Blank single:19197::\"Day's\",\"Weeks\",\"Months\",\"Years\"}   Did you take a {Blank single:19197::\"Nitro\",\"Motrin\",\"Tums\",\"Pain Medications\"}   And did it relieve the pain - {Blank single:19197::\"Yes\",\"No\"}    Have you had any Shortness of Breath - {Blank single:19197::\"Yes\",\"No\"}  If Yes - When {Blank single:19197::\"on exertion\",\"at rest\",\"at rest and on exertion\",\"carrying something\"}    Have you had any dizziness - {Blank single:19197::\"Yes\",\"No\"}  If Yes DO ORTHOSTATIC BP - when do you feel dizzy {Blank single:19197::\"with position change\",\"walking\",\"laying down\",\"does the room spin\"}   How long does it last ***    Have you had any palpitations - {Blank single:19197::\"Yes\",\"No\"}  If Yes DO EKG - Do you feel your heart {Blank single:19197::\"racing\",\"pounding\",\"skipping\",\"fluttering\"}  How long does it last - {Blank single:19197::\"seconds\",\"minutes\",\"hours\"}     Do you have any edema - swelling in legs    If Yes - CHECK TO SEE IF THE EDEMA IS PITTING  How long have they been having edema - Years  If Yes - Have they worn compression stockings No    Do you have a surgery or procedure scheduled in the near future - No

## 2020-11-10 ENCOUNTER — TELEPHONE (OUTPATIENT)
Dept: CARDIOLOGY CLINIC | Age: 75
End: 2020-11-10

## 2020-11-10 NOTE — TELEPHONE ENCOUNTER
Patient called asking if she should have some blood work done to check her potassium levels, please return her call at ph# [phone].

## 2020-12-11 PROBLEM — R60.0 EDEMA LEG: Status: ACTIVE | Noted: 2020-12-11

## 2020-12-18 ENCOUNTER — HOSPITAL ENCOUNTER (OUTPATIENT)
Age: 75
Setting detail: SPECIMEN
Discharge: HOME | End: 2020-12-18
Payer: MEDICARE

## 2020-12-18 LAB
ALBUMIN SERPL-MCNC: 3.9 GM/DL (ref 3.4–5)
ANION GAP SERPL CALCULATED.3IONS-SCNC: 12 MMOL/L (ref 4–16)
BACTERIA: NEGATIVE /HPF
BILIRUBIN URINE: NEGATIVE MG/DL
BLOOD, URINE: NEGATIVE
BUN BLDV-MCNC: 27 MG/DL (ref 6–23)
CALCIUM SERPL-MCNC: 8.8 MG/DL (ref 8.3–10.6)
CHLORIDE BLD-SCNC: 99 MMOL/L (ref 99–110)
CLARITY: CLEAR
CO2: 26 MMOL/L (ref 21–32)
COLOR: ABNORMAL
CREAT SERPL-MCNC: 1.4 MG/DL (ref 0.6–1.1)
CREATININE URINE: 43.6 MG/DL (ref 28–217)
GFR AFRICAN AMERICAN: 44 ML/MIN/1.73M2
GFR NON-AFRICAN AMERICAN: 36 ML/MIN/1.73M2
GLUCOSE BLD-MCNC: 126 MG/DL (ref 70–99)
GLUCOSE, URINE: NEGATIVE MG/DL
KETONES, URINE: NEGATIVE MG/DL
LEUKOCYTE ESTERASE, URINE: ABNORMAL
NITRITE URINE, QUANTITATIVE: NEGATIVE
PH, URINE: 5 (ref 5–8)
PHOSPHORUS: 3.9 MG/DL (ref 2.5–4.9)
POTASSIUM SERPL-SCNC: 4.2 MMOL/L (ref 3.5–5.1)
PROT/CREAT RATIO, UR: 0.1
PROTEIN UA: NEGATIVE MG/DL
RBC URINE: 1 /HPF (ref 0–6)
SODIUM BLD-SCNC: 137 MMOL/L (ref 135–145)
SPECIFIC GRAVITY UA: 1.01 (ref 1–1.03)
SQUAMOUS EPITHELIAL: <1 /HPF
TRICHOMONAS: ABNORMAL /HPF
URINE TOTAL PROTEIN: 4 MG/DL
UROBILINOGEN, URINE: NORMAL MG/DL (ref 0.2–1)
VITAMIN D 25-HYDROXY: 42.25 NG/ML
WBC UA: 3 /HPF (ref 0–5)

## 2020-12-18 PROCEDURE — 82570 ASSAY OF URINE CREATININE: CPT

## 2020-12-18 PROCEDURE — 36415 COLL VENOUS BLD VENIPUNCTURE: CPT

## 2020-12-18 PROCEDURE — 84156 ASSAY OF PROTEIN URINE: CPT

## 2020-12-18 PROCEDURE — 82306 VITAMIN D 25 HYDROXY: CPT

## 2020-12-18 PROCEDURE — 80069 RENAL FUNCTION PANEL: CPT

## 2020-12-18 PROCEDURE — 81001 URINALYSIS AUTO W/SCOPE: CPT

## 2020-12-21 PROBLEM — N18.32 STAGE 3B CHRONIC KIDNEY DISEASE (HCC): Status: ACTIVE | Noted: 2020-01-21

## 2020-12-21 PROBLEM — I12.9 HYPERTENSIVE RENAL DISEASE: Status: ACTIVE | Noted: 2020-12-21

## 2020-12-24 RX ORDER — ATENOLOL 50 MG/1
25 TABLET ORAL DAILY
Qty: 15 TABLET | Refills: 5 | Status: SHIPPED | OUTPATIENT
Start: 2020-12-24 | End: 2021-08-20 | Stop reason: SDUPTHER

## 2021-01-08 ENCOUNTER — HOSPITAL ENCOUNTER (OUTPATIENT)
Age: 76
Setting detail: SPECIMEN
Discharge: HOME | End: 2021-01-08
Payer: MEDICARE

## 2021-01-08 PROCEDURE — 36415 COLL VENOUS BLD VENIPUNCTURE: CPT

## 2021-01-08 PROCEDURE — 84630 ASSAY OF ZINC: CPT

## 2021-01-08 PROCEDURE — 82943 ASSAY OF GLUCAGON: CPT

## 2021-01-14 LAB — GLUCAGON LVL: 131 NG/L

## 2021-01-15 ENCOUNTER — HOSPITAL ENCOUNTER (OUTPATIENT)
Age: 76
Setting detail: SPECIMEN
Discharge: HOME | End: 2021-01-15
Payer: MEDICARE

## 2021-01-15 LAB
ALBUMIN SERPL-MCNC: 3.8 GM/DL (ref 3.4–5)
ANION GAP SERPL CALCULATED.3IONS-SCNC: 9 MMOL/L (ref 4–16)
BUN BLDV-MCNC: 23 MG/DL (ref 6–23)
CALCIUM SERPL-MCNC: 9 MG/DL (ref 8.3–10.6)
CHLORIDE BLD-SCNC: 100 MMOL/L (ref 99–110)
CO2: 32 MMOL/L (ref 21–32)
CREAT SERPL-MCNC: 1.5 MG/DL (ref 0.6–1.1)
GFR AFRICAN AMERICAN: 41 ML/MIN/1.73M2
GFR NON-AFRICAN AMERICAN: 34 ML/MIN/1.73M2
GLUCOSE BLD-MCNC: 107 MG/DL (ref 70–99)
PHOSPHORUS: 3.8 MG/DL (ref 2.5–4.9)
POTASSIUM SERPL-SCNC: 4.4 MMOL/L (ref 3.5–5.1)
SODIUM BLD-SCNC: 141 MMOL/L (ref 135–145)

## 2021-01-15 PROCEDURE — 36415 COLL VENOUS BLD VENIPUNCTURE: CPT

## 2021-01-15 PROCEDURE — 84630 ASSAY OF ZINC: CPT

## 2021-01-15 PROCEDURE — 80069 RENAL FUNCTION PANEL: CPT

## 2021-01-18 LAB — ZINC: 69.7 UG/DL (ref 60–120)

## 2021-01-28 ENCOUNTER — TELEPHONE (OUTPATIENT)
Dept: CARDIOLOGY CLINIC | Age: 76
End: 2021-01-28

## 2021-01-28 NOTE — TELEPHONE ENCOUNTER
Pt called and states her bp is running high    1/25  Am  1/48/82 p 68   Pm 145/79  p 79  145/75 p 71    1/26  Am  137/85 p 66      Pm 148/80 p 73    1/27 am 139/80 p 65    1/28 am 143/85 p 69    covid injection 12/20/20    covid injection 1/20/21    Having neck and head pain also

## 2021-02-12 ENCOUNTER — HOSPITAL ENCOUNTER (OUTPATIENT)
Age: 76
Setting detail: SPECIMEN
Discharge: HOME | End: 2021-02-12
Payer: MEDICARE

## 2021-02-12 LAB
ANION GAP SERPL CALCULATED.3IONS-SCNC: 11 MMOL/L (ref 4–16)
BUN BLDV-MCNC: 25 MG/DL (ref 6–23)
CALCIUM SERPL-MCNC: 8.8 MG/DL (ref 8.3–10.6)
CHLORIDE BLD-SCNC: 99 MMOL/L (ref 99–110)
CO2: 31 MMOL/L (ref 21–32)
CREAT SERPL-MCNC: 1.5 MG/DL (ref 0.6–1.1)
GFR AFRICAN AMERICAN: 41 ML/MIN/1.73M2
GFR NON-AFRICAN AMERICAN: 34 ML/MIN/1.73M2
GLUCOSE BLD-MCNC: 106 MG/DL (ref 70–99)
POTASSIUM SERPL-SCNC: 4.6 MMOL/L (ref 3.5–5.1)
SODIUM BLD-SCNC: 141 MMOL/L (ref 135–145)

## 2021-02-12 PROCEDURE — 36415 COLL VENOUS BLD VENIPUNCTURE: CPT

## 2021-02-12 PROCEDURE — 80048 BASIC METABOLIC PNL TOTAL CA: CPT

## 2021-03-12 ENCOUNTER — HOSPITAL ENCOUNTER (OUTPATIENT)
Age: 76
Setting detail: SPECIMEN
Discharge: HOME | End: 2021-03-12
Payer: MEDICARE

## 2021-03-12 LAB
ANION GAP SERPL CALCULATED.3IONS-SCNC: 6 MMOL/L (ref 4–16)
BUN BLDV-MCNC: 24 MG/DL (ref 6–23)
CALCIUM SERPL-MCNC: 8.6 MG/DL (ref 8.3–10.6)
CHLORIDE BLD-SCNC: 100 MMOL/L (ref 99–110)
CO2: 35 MMOL/L (ref 21–32)
CREAT SERPL-MCNC: 1.5 MG/DL (ref 0.6–1.1)
GFR AFRICAN AMERICAN: 41 ML/MIN/1.73M2
GFR NON-AFRICAN AMERICAN: 34 ML/MIN/1.73M2
GLUCOSE BLD-MCNC: 109 MG/DL (ref 70–99)
POTASSIUM SERPL-SCNC: 3.8 MMOL/L (ref 3.5–5.1)
SODIUM BLD-SCNC: 141 MMOL/L (ref 135–145)

## 2021-03-12 PROCEDURE — 36415 COLL VENOUS BLD VENIPUNCTURE: CPT

## 2021-03-12 PROCEDURE — 80048 BASIC METABOLIC PNL TOTAL CA: CPT

## 2021-03-23 PROBLEM — N28.1 KIDNEY CYSTS: Status: ACTIVE | Noted: 2021-03-23

## 2021-04-05 RX ORDER — DIGOXIN 125 MCG
125 TABLET ORAL DAILY
Qty: 90 TABLET | Refills: 3 | OUTPATIENT
Start: 2021-04-05

## 2021-04-12 ENCOUNTER — TELEPHONE (OUTPATIENT)
Dept: CARDIOLOGY CLINIC | Age: 76
End: 2021-04-12

## 2021-04-12 NOTE — TELEPHONE ENCOUNTER
Per Marcella Blanco NP, may extend rx to cover patient till next OV 5/18. Verbal order to [address].  Patient notified

## 2021-04-12 NOTE — TELEPHONE ENCOUNTER
Patient called for a refill for her Digoxen   It was refused in Epic stated wanted   To discuss @ next appt .  She was   Asking if she is to stop , she will be out   Before her appt in May

## 2021-05-07 ENCOUNTER — HOSPITAL ENCOUNTER (OUTPATIENT)
Age: 76
Setting detail: SPECIMEN
Discharge: HOME | End: 2021-05-07
Payer: MEDICARE

## 2021-05-07 LAB
ANION GAP SERPL CALCULATED.3IONS-SCNC: 9 MMOL/L (ref 4–16)
BUN BLDV-MCNC: 22 MG/DL (ref 6–23)
CALCIUM SERPL-MCNC: 9.1 MG/DL (ref 8.3–10.6)
CHLORIDE BLD-SCNC: 101 MMOL/L (ref 99–110)
CO2: 31 MMOL/L (ref 21–32)
CREAT SERPL-MCNC: 1.5 MG/DL (ref 0.6–1.1)
ESTIMATED AVERAGE GLUCOSE: 128 MG/DL
GFR AFRICAN AMERICAN: 41 ML/MIN/1.73M2
GFR NON-AFRICAN AMERICAN: 34 ML/MIN/1.73M2
GLUCOSE BLD-MCNC: 127 MG/DL (ref 70–99)
HBA1C MFR BLD: 6.1 % (ref 4.2–6.3)
POTASSIUM SERPL-SCNC: 4.5 MMOL/L (ref 3.5–5.1)
SODIUM BLD-SCNC: 141 MMOL/L (ref 135–145)

## 2021-05-07 PROCEDURE — 83036 HEMOGLOBIN GLYCOSYLATED A1C: CPT

## 2021-05-07 PROCEDURE — 80048 BASIC METABOLIC PNL TOTAL CA: CPT

## 2021-05-07 PROCEDURE — 36415 COLL VENOUS BLD VENIPUNCTURE: CPT

## 2021-05-28 PROBLEM — R60.9 LIPEDEMA: Status: ACTIVE | Noted: 2021-05-28

## 2021-06-02 ENCOUNTER — OFFICE VISIT (OUTPATIENT)
Dept: CARDIOLOGY CLINIC | Age: 76
End: 2021-06-02
Payer: MEDICARE

## 2021-06-02 VITALS
SYSTOLIC BLOOD PRESSURE: 142 MMHG | DIASTOLIC BLOOD PRESSURE: 82 MMHG | WEIGHT: 125 LBS | HEART RATE: 70 BPM | BODY MASS INDEX: 23.6 KG/M2 | HEIGHT: 61 IN

## 2021-06-02 DIAGNOSIS — I10 ESSENTIAL HYPERTENSION: ICD-10-CM

## 2021-06-02 DIAGNOSIS — E78.5 HYPERLIPIDEMIA, UNSPECIFIED HYPERLIPIDEMIA TYPE: Primary | ICD-10-CM

## 2021-06-02 PROCEDURE — 99214 OFFICE O/P EST MOD 30 MIN: CPT | Performed by: INTERNAL MEDICINE

## 2021-06-02 NOTE — PATIENT INSTRUCTIONS
**It is YOUR responsibilty to bring medication bottles and/or updated medication list to [address] [place] [address]. This will allow us to better serve you and all your healthcare needs**    Please be informed that if you contact our office outside of normal business hours the physician on call cannot help with any scheduling or rescheduling issues, procedure instruction questions or any type of medication issue. We advise you for any urgent/emergency that you go to the nearest emergency room!     PLEASE CALL OUR OFFICE DURING NORMAL BUSINESS HOURS    Monday - Friday   8 am to 5 pm    [place]: [place] [address]: [phone]    [place]:  [phone]

## 2021-06-08 ENCOUNTER — TELEPHONE (OUTPATIENT)
Dept: CARDIOLOGY CLINIC | Age: 76
End: 2021-06-08

## 2021-06-08 RX ORDER — DIGOXIN 125 MCG
125 TABLET ORAL DAILY
Qty: 90 TABLET | Refills: 3 | Status: SHIPPED | OUTPATIENT
Start: 2021-06-08 | End: 2022-05-23 | Stop reason: SDUPTHER

## 2021-06-08 NOTE — TELEPHONE ENCOUNTER
Patient called she stated that she has been taking Digoxen   1 tablet one day 1/2 tablet every other day , she stated it is to much  On her body

## 2021-07-06 ENCOUNTER — APPOINTMENT (OUTPATIENT)
Dept: CT IMAGING | Age: 76
End: 2021-07-06
Payer: MEDICARE

## 2021-07-06 ENCOUNTER — HOSPITAL ENCOUNTER (EMERGENCY)
Age: 76
Discharge: HOME | End: 2021-07-06
Payer: MEDICARE

## 2021-07-06 ENCOUNTER — APPOINTMENT (OUTPATIENT)
Dept: GENERAL RADIOLOGY | Age: 76
End: 2021-07-06
Payer: MEDICARE

## 2021-07-06 VITALS
TEMPERATURE: 98.4 F | DIASTOLIC BLOOD PRESSURE: 133 MMHG | OXYGEN SATURATION: 100 % | SYSTOLIC BLOOD PRESSURE: 148 MMHG | HEART RATE: 64 BPM | RESPIRATION RATE: 16 BRPM

## 2021-07-06 DIAGNOSIS — M54.9 UPPER BACK PAIN ON LEFT SIDE: Primary | ICD-10-CM

## 2021-07-06 DIAGNOSIS — R91.1 PULMONARY NODULE, RIGHT: ICD-10-CM

## 2021-07-06 LAB
ALBUMIN SERPL-MCNC: 4.6 GM/DL (ref 3.4–5)
ALP BLD-CCNC: 80 IU/L (ref 40–129)
ALT SERPL-CCNC: 12 U/L (ref 10–40)
ANION GAP SERPL CALCULATED.3IONS-SCNC: 9 MMOL/L (ref 4–16)
AST SERPL-CCNC: 19 IU/L (ref 15–37)
BASOPHILS ABSOLUTE: 0 K/CU MM
BASOPHILS RELATIVE PERCENT: 0.3 % (ref 0–1)
BILIRUB SERPL-MCNC: 0.5 MG/DL (ref 0–1)
BUN BLDV-MCNC: 25 MG/DL (ref 6–23)
CALCIUM SERPL-MCNC: 9.3 MG/DL (ref 8.3–10.6)
CHLORIDE BLD-SCNC: 100 MMOL/L (ref 99–110)
CO2: 29 MMOL/L (ref 21–32)
CREAT SERPL-MCNC: 1.3 MG/DL (ref 0.6–1.1)
D DIMER: 373 NG/ML(DDU)
DIFFERENTIAL TYPE: ABNORMAL
DIGOXIN LEVEL: 1.2 NG/ML (ref 0.8–2)
DOSE AMOUNT: NORMAL
DOSE TIME: NORMAL
EKG ATRIAL RATE: 58 BPM
EKG DIAGNOSIS: NORMAL
EKG P AXIS: 66 DEGREES
EKG P-R INTERVAL: 144 MS
EKG Q-T INTERVAL: 424 MS
EKG QRS DURATION: 80 MS
EKG QTC CALCULATION (BAZETT): 416 MS
EKG R AXIS: -10 DEGREES
EKG T AXIS: 15 DEGREES
EKG VENTRICULAR RATE: 58 BPM
EOSINOPHILS ABSOLUTE: 0 K/CU MM
EOSINOPHILS RELATIVE PERCENT: 0 % (ref 0–3)
GFR AFRICAN AMERICAN: 48 ML/MIN/1.73M2
GFR NON-AFRICAN AMERICAN: 40 ML/MIN/1.73M2
GLUCOSE BLD-MCNC: 126 MG/DL (ref 70–99)
HCT VFR BLD CALC: 42.7 % (ref 37–47)
HEMOGLOBIN: 13.2 GM/DL (ref 12.5–16)
IMMATURE NEUTROPHIL %: 0.4 % (ref 0–0.43)
LYMPHOCYTES ABSOLUTE: 1.4 K/CU MM
LYMPHOCYTES RELATIVE PERCENT: 15.4 % (ref 24–44)
MCH RBC QN AUTO: 27.7 PG (ref 27–31)
MCHC RBC AUTO-ENTMCNC: 30.9 % (ref 32–36)
MCV RBC AUTO: 89.7 FL (ref 78–100)
MONOCYTES ABSOLUTE: 0.5 K/CU MM
MONOCYTES RELATIVE PERCENT: 5.2 % (ref 0–4)
NUCLEATED RBC %: 0 %
PDW BLD-RTO: 14.7 % (ref 11.7–14.9)
PLATELET # BLD: 221 K/CU MM (ref 140–440)
PMV BLD AUTO: 10.7 FL (ref 7.5–11.1)
POTASSIUM SERPL-SCNC: 4.5 MMOL/L (ref 3.5–5.1)
PRO-BNP: 709.1 PG/ML
RBC # BLD: 4.76 M/CU MM (ref 4.2–5.4)
SEGMENTED NEUTROPHILS ABSOLUTE COUNT: 7.3 K/CU MM
SEGMENTED NEUTROPHILS RELATIVE PERCENT: 78.7 % (ref 36–66)
SODIUM BLD-SCNC: 138 MMOL/L (ref 135–145)
TOTAL IMMATURE NEUTOROPHIL: 0.04 K/CU MM
TOTAL NUCLEATED RBC: 0 K/CU MM
TOTAL PROTEIN: 6.8 GM/DL (ref 6.4–8.2)
TROPONIN T: <0.01 NG/ML
TROPONIN T: <0.01 NG/ML
WBC # BLD: 9.3 K/CU MM (ref 4–10.5)

## 2021-07-06 PROCEDURE — 96360 HYDRATION IV INFUSION INIT: CPT

## 2021-07-06 PROCEDURE — 85025 COMPLETE CBC W/AUTO DIFF WBC: CPT

## 2021-07-06 PROCEDURE — 93010 ELECTROCARDIOGRAM REPORT: CPT | Performed by: INTERNAL MEDICINE

## 2021-07-06 PROCEDURE — 2580000003 HC RX 258: Performed by: PHYSICIAN ASSISTANT

## 2021-07-06 PROCEDURE — 71275 CT ANGIOGRAPHY CHEST: CPT

## 2021-07-06 PROCEDURE — 6370000000 HC RX 637 (ALT 250 FOR IP): Performed by: PHYSICIAN ASSISTANT

## 2021-07-06 PROCEDURE — 85379 FIBRIN DEGRADATION QUANT: CPT

## 2021-07-06 PROCEDURE — 80162 ASSAY OF DIGOXIN TOTAL: CPT

## 2021-07-06 PROCEDURE — 99283 EMERGENCY DEPT VISIT LOW MDM: CPT

## 2021-07-06 PROCEDURE — 6360000004 HC RX CONTRAST MEDICATION: Performed by: PHYSICIAN ASSISTANT

## 2021-07-06 PROCEDURE — 84484 ASSAY OF TROPONIN QUANT: CPT

## 2021-07-06 PROCEDURE — 73030 X-RAY EXAM OF SHOULDER: CPT

## 2021-07-06 PROCEDURE — 93005 ELECTROCARDIOGRAM TRACING: CPT | Performed by: EMERGENCY MEDICINE

## 2021-07-06 PROCEDURE — 83880 ASSAY OF NATRIURETIC PEPTIDE: CPT

## 2021-07-06 PROCEDURE — 80053 COMPREHEN METABOLIC PANEL: CPT

## 2021-07-06 PROCEDURE — 71045 X-RAY EXAM CHEST 1 VIEW: CPT

## 2021-07-06 RX ORDER — LIDOCAINE 50 MG/G
1 PATCH TOPICAL DAILY
Qty: 10 PATCH | Refills: 0 | Status: SHIPPED | OUTPATIENT
Start: 2021-07-06 | End: 2021-07-16

## 2021-07-06 RX ORDER — LIDOCAINE 4 G/G
1 PATCH TOPICAL DAILY
Status: DISCONTINUED | OUTPATIENT
Start: 2021-07-06 | End: 2021-07-06 | Stop reason: HOSPADM

## 2021-07-06 RX ORDER — 0.9 % SODIUM CHLORIDE 0.9 %
500 INTRAVENOUS SOLUTION INTRAVENOUS ONCE
Status: COMPLETED | OUTPATIENT
Start: 2021-07-06 | End: 2021-07-06

## 2021-07-06 RX ADMIN — SODIUM CHLORIDE 500 ML: 9 INJECTION, SOLUTION INTRAVENOUS at 12:30

## 2021-07-06 RX ADMIN — IOPAMIDOL 83 ML: 755 INJECTION, SOLUTION INTRAVENOUS at 13:49

## 2021-07-06 ASSESSMENT — PAIN SCALES - GENERAL: PAINLEVEL_OUTOF10: 8

## 2021-07-06 NOTE — ED TRIAGE NOTES
Pt presents to the ED via EMS for left shoulder pain. Pt states it has been going on for the past two days. Pt is alert and oriented. Rates pain 8/10.

## 2021-07-06 NOTE — ED PROVIDER NOTES
EMERGENCY DEPARTMENT ENCOUNTER      PCP: Aditya Naik MD    CHIEF COMPLAINT    Chief Complaint   Patient presents with    Shoulder Pain     left         Of note, this patient was not evaluated by attending physician, attending physician was available for consultation. HPI    Min Hankins is a 64 y.o. female who presents with a chief complaint of left upper back pain/left medial shoulder pain. States that she has had intermittent pain for the last 2 days but really felt it after running some errands last night/early this morning. She locates the pain to the medial component of the scapula. States that it pain with deep inspiration and some movement. Patient states that she has been using Tylenol without significant relief of her symptoms. Denies any significant chest pain shortness of breath, no palpitations, no lightheadedness or dizziness. No syncope or near syncope. Patient does admit that she has a history of fibromyalgia also has a history of chronic kidney disease as well as lipedema had history of kidney cysts. No history of blood clots. No hemoptysis, no recent illnesses. No cough or shortness of breath. No wheezing    REVIEW OF SYSTEMS    Constitutional:  Denies fever, chills, weight loss or weakness   HENT:  Denies sore throat or ear pain   Cardiovascular:  No chest pain. No palpitations, No syncope  Respiratory:  See HPI. GI:  Denies abdominal pain, nausea, vomiting, or diarrhea  :  Denies any urinary symptoms or vaginal symptoms. Musculoskeletal:  See HPI.   Skin:  Denies rash  Neurologic:  Denies headache, focal weakness or sensory changes   Endocrine:  Denies polyuria or polydypsia   Lymphatic:  Denies swollen glands     All other review of systems are negative  See HPI and nursing notes for additional information       PAST MEDICAL & SURGICAL HISTORY    Past Medical History:   Diagnosis Date    Anemia     Angioedema 4/7/2014    APC (atrial premature contractions) 2/2012 inguinal pain     referred from Dr. Matt Ruiz to Dr. Mariann Armas then PT ( no hernia)    Shortness of breath on exertion     Skin cancer of nose In Past    Dr. Edgar Carter,  previously see Dr. Santana Dee Teeth missing     Upper And Lower    UTI (urinary tract infection) 03/2018    Vitamin D deficiency     Wears glasses     Wears partial dentures     Upper     Past Surgical History:   Procedure Laterality Date    BLADDER SURGERY  2000, 2008    Dr. Nichole Davis- brenda [place]  2011    Dr. Sathya Munoz [facility] [place];Grade 1 internal hemorrhoids    DENTAL SURGERY      Teeth Extracted In Past    ENDOSCOPY, COLON, DIAGNOSTIC  In Past    EYE SURGERY Left 11/2017    Cataract With Lens Implant    HYSTERECTOMY, TOTAL ABDOMINAL  1/14    \"took everything    SHOULDER ARTHROSCOPY Right 11/2017    rotator cuff repair, SAd, distal clavicle excision, extensive debridement    SKIN CANCER EXCISION  In Past    Nose    TONSILLECTOMY  1960's       CURRENT MEDICATIONS    Current Outpatient Rx   Medication Sig Dispense Refill    lidocaine (LIDODERM) 5 % Place 1 patch onto the skin daily for 10 days 12 hours on, 12 hours off.  10 patch 0    digoxin (LANOXIN) 125 MCG tablet Take 1 tablet by mouth daily (Patient taking differently: Take 125 mcg by mouth daily Patient take 1 tab every other day and 1/2 tab on opposite days) 90 tablet 3    furosemide (LASIX) 40 MG tablet Take 1 tablet by mouth See Admin Instructions ONLY ON MWF 15 tablet 3    atenolol (TENORMIN) 50 MG tablet Take 0.5 tablets by mouth daily Dose decrease as of 4/15/2013 15 tablet 5    DULoxetine (CYMBALTA) 60 MG extended release capsule Take 60 mg by mouth daily      Probiotic Product (PROBIOTIC-10 PO) Take 1 tablet by mouth daily       folic acid (FOLVITE) 1 MG tablet Take 1 mg by mouth daily       acetaminophen (TYLENOL) 325 MG tablet Take 2 tablets by mouth every 6 hours as needed for Pain 120 tablet 0    aspirin 81 MG EC tablet Take 81 mg by mouth every morning Over The Counter      ALPRAZolam (XANAX) 0.5 MG tablet Take 1.5 mg by mouth 3 times daily.  clobetasol (TEMOVATE) 0.05 % cream Apply twice daily 60 g 3    nortriptyline (PAMELOR) 10 MG/5ML solution Take 2 ml by mouth nightly (clarification as of 8/2/2012)- please give 30 day supply with 3 refills (Patient taking differently: Take 5 mg by mouth nightly Take 2 ml by mouth nightly) 1 Bottle 5    gabapentin (NEURONTIN) 250 MG/5ML solution Take 4 mL by mouth daily. (Patient taking differently: Take 300 mg by mouth nightly.  Take 4 mL by mouth daily.) 450 mL 3    Multiple Vitamin (MULTIVITAMIN PO) Take 1 tablet by mouth every morning Over The Counter         ALLERGIES    Allergies   Allergen Reactions    Demerol Rash    Pcn [Penicillins] Rash    Statins      \"Causes Muscle Aches And Pains\"    Sulfa Antibiotics Rash    Vicodin [Hydrocodone-Acetaminophen]      \"Dizziness\"    Zetia [Ezetimibe] Palpitations and Other (See Comments)     Heart palpitations  \"Heart Palpitations\"    Adhesive Tape Other (See Comments) and Rash     Muscles aches    Glycerin      \"tongue swells\"    Percocet [Oxycodone-Acetaminophen] Other (See Comments)     \"hallucination\"    Meperidine Rash    Prednisone Palpitations       SOCIAL & FAMILY HISTORY    Social History     Socioeconomic History    Marital status:      Spouse name: None    Number of children: None    Years of education: None    Highest education level: None   Occupational History    None   Tobacco Use    Smoking status: Never Smoker    Smokeless tobacco: Never Used   Vaping Use    Vaping Use: Never used   Substance and Sexual Activity    Alcohol use: No    Drug use: No    Sexual activity: Never   Other Topics Concern    None   Social History Narrative        Exercise - none    Retired college counselor     Social Determinants of Health     Financial Resource Strain:     Difficulty of Paying Living Expenses:    Food Insecurity:     Worried About [facility] in the Last Year:     [address] [facility] [address] in the Last Year:    Transportation Needs:     Lack of Transportation (Medical):  Lack of Transportation (Non-Medical):    Physical Activity:     Days of Exercise per Week:     Minutes of Exercise per Session:    Stress:     Feeling of Stress :    Social Connections:     Frequency of Communication with Friends and Family:     Frequency of Social Gatherings with Friends and Family:     Attends Yaz[employer] Services:     Active Member of Clubs or Organizations:     Attends Club or Organization Meetings:     Marital Status:    Intimate Partner Violence:     Fear of Current or Ex-Partner:     Emotionally Abused:     Physically Abused:     Sexually Abused:      Family History   Problem Relation Age of Onset    Mental Illness Son     Arthritis Mother     Hearing Loss Mother     Heart Disease Mother         \"Aneurysm In [place]"    Stroke Father     Heart Attack Father     Heart Disease Father         Heart Attack    Diabetes Father     Other Brother         \"Colon Polyps\"    Heart Disease Brother         \"Heart Surgery\"       PHYSICAL EXAM    VITAL SIGNS: BP (!) 148/133   Pulse 64   Temp 98.4 °F (36.9 °C) (Oral)   Resp 16   SpO2 100%    Constitutional:  Well developed, well nourished, no acute distress   HENT:  Atraumatic, moist mucus membranes  Neck/Lymphatics: supple, no JVD, no swollen nodes  Respiratory:   Nonlabored breathing. Rate 16. Lungs CTAB, no retractions   Cardiovascular:  Rate regular, normal Rhythm,  no murmurs/rubs/gallops. No carotid bruits or murmurs heard in carotids. No JVD  GI:  Soft, nontender, normal bowel sounds  Musculoskeletal:    On inspection of the left upper back/scapular area there is no obvious defect or deformity, no discoloration or bruising, no open sores or wounds. No new rash.   Has mild reproducible tenderness into the medial component of the scapula. Worsened with movement and deep inspiration. BACK: There is not thoracic or lumbar midline tenderness to palpation or step-offs. Paraspinal tenderness to palpation is not present in the parathoracic, paralumbar region. There is bilateral pitting edema to the lower extremity, no discoloration, cyanosis, no developing erythema. No asymmetry. No cool or pale-appearing limb.   Distal cap refill and pulses intact bilateral upper and lower extremities  Bilateral upper and lower extremity ROM intact without pain or obvious deficit  Integument:  Skin is warm and dry, no petechiae   Neurologic:  Alert & oriented, no slurred speech  Psych: Pleasant affect, no hallucinations    EKG    See supervising physician's note for EKG interpretation    LABS:  Results for orders placed or performed during the hospital encounter of 07/06/21   CBC auto diff   Result Value Ref Range    WBC 9.3 4.0 - 10.5 K/CU MM    RBC 4.76 4.2 - 5.4 M/CU MM    Hemoglobin 13.2 12.5 - 16.0 GM/DL    Hematocrit 42.7 37 - 47 %    MCV 89.7 78 - 100 FL    MCH 27.7 27 - 31 PG    MCHC 30.9 (L) 32.0 - 36.0 %    RDW 14.7 11.7 - 14.9 %    Platelets 636 629 - 084 K/CU MM    MPV 10.7 7.5 - 11.1 FL    Differential Type AUTOMATED DIFFERENTIAL     Segs Relative 78.7 (H) 36 - 66 %    Lymphocytes % 15.4 (L) 24 - 44 %    Monocytes % 5.2 (H) 0 - 4 %    Eosinophils % 0.0 0 - 3 %    Basophils % 0.3 0 - 1 %    Segs Absolute 7.3 K/CU MM    Lymphocytes Absolute 1.4 K/CU MM    Monocytes Absolute 0.5 K/CU MM    Eosinophils Absolute 0.0 K/CU MM    Basophils Absolute 0.0 K/CU MM    Nucleated RBC % 0.0 %    Total Nucleated RBC 0.0 K/CU MM    Total Immature Neutrophil 0.04 K/CU MM    Immature Neutrophil % 0.4 0 - 0.43 %   CMP   Result Value Ref Range    Sodium 138 135 - 145 MMOL/L    Potassium 4.5 3.5 - 5.1 MMOL/L    Chloride 100 99 - 110 mMol/L    CO2 29 21 - 32 MMOL/L    BUN 25 (H) 6 - 23 MG/DL    CREATININE 1.3 (H) 0.6 - 1.1 MG/DL    Glucose 126 (H) 70 - 99 MG/DL    Calcium 9.3 8.3 - 10.6 MG/DL    Albumin 4.6 3.4 - 5.0 GM/DL    Total Protein 6.8 6.4 - 8.2 GM/DL    Total Bilirubin 0.5 0.0 - 1.0 MG/DL    ALT 12 10 - 40 U/L    AST 19 15 - 37 IU/L    Alkaline Phosphatase 80 40 - 129 IU/L    GFR Non- 40 (L) >60 mL/min/1.73m2    GFR  48 (L) >60 mL/min/1.73m2    Anion Gap 9 4 - 16   Troponin   Result Value Ref Range    Troponin T <0.010 <0.01 NG/ML   Brain Natriuretic Peptide   Result Value Ref Range    Pro-.1 (H) <300 PG/ML   D-dimer, Quantitative   Result Value Ref Range    D-Dimer, Quant 373 (H) <230 NG/mL(DDU)   Digoxin Level   Result Value Ref Range    Digoxin Lvl 1.2 0.8 - 2.0 ng/mL    DOSE AMOUNT DOSE AMT. GIVEN - UNKNOWN     DOSE TIME DOSE TIME GIVEN - UNKNOWN    Troponin   Result Value Ref Range    Troponin T <0.010 <0.01 NG/ML   EKG 12 Lead   Result Value Ref Range    Ventricular Rate 58 BPM    Atrial Rate 58 BPM    P-R Interval 144 ms    QRS Duration 80 ms    Q-T Interval 424 ms    QTc Calculation (Bazett) 416 ms    P Axis 66 degrees    R Axis -10 degrees    T Axis 15 degrees    Diagnosis       Sinus bradycardia  Septal infarct , age undetermined  Abnormal ECG  When compared with ECG of 20-FEB-2020 06:27,  Septal infarct is now present       RADIOLOGY/PROCEDURES    XR SHOULDER LEFT (MIN 2 VIEWS)    Result Date: 7/6/2021  EXAMINATION: THREE XRAY VIEWS OF THE LEFT SHOULDER 7/6/2021 6:21 am COMPARISON: 08/22/2017. HISTORY: ORDERING SYSTEM PROVIDED HISTORY: pain TECHNOLOGIST PROVIDED HISTORY: Reason for exam:->pain Reason for Exam: pain with deep inhalation Acuity: Acute Type of Exam: Initial FINDINGS: No acute osseous abnormality seen of the left shoulder. Minimal degenerative changes of the glenohumeral and acromioclavicular joints. There is no evidence of dislocation. The soft tissues demonstrate no acute abnormality. 1. No acute abnormality identified of the left shoulder. 2. Minimal degenerative changes.      XR CHEST PORTABLE    Result Date: 7/6/2021  EXAMINATION: ONE XRAY VIEW OF THE CHEST 7/6/2021 7:41 am COMPARISON: November 9, 2020 HISTORY: ORDERING SYSTEM PROVIDED HISTORY: left upper back pain with inspiration TECHNOLOGIST PROVIDED HISTORY: Reason for exam:->left upper back pain with inspiration Reason for Exam: left upper back pain with inspiration FINDINGS: Stable cardiomediastinal silhouette. There is no focal consolidation, pleural effusion, or pneumothorax. The osseous structures are stable. No acute process. CTA PULMONARY W CONTRAST    Result Date: 7/6/2021  EXAMINATION: CTA OF THE CHEST 7/6/2021 1:25 pm TECHNIQUE: CTA of the chest was performed after the administration of intravenous contrast.  Multiplanar reformatted images are provided for review. MIP images are provided for review. Dose modulation, iterative reconstruction, and/or weight based adjustment of the mA/kV was utilized to reduce the radiation dose to as low as reasonably achievable. COMPARISON: None. HISTORY: ORDERING SYSTEM PROVIDED HISTORY: Left upper lung pain, elevated d dimer TECHNOLOGIST PROVIDED HISTORY: Reason for exam:->Left upper lung pain, elevated d dimer Decision Support Exception - unselect if not a suspected or confirmed emergency medical condition->Emergency Medical Condition (MA) Reason for Exam: Left upper lung pain, elevated d dimer Acuity: Acute Type of Exam: Initial Additional signs and symptoms: Left Shoulder Pain FINDINGS: Pulmonary Arteries: Pulmonary arteries are adequately opacified for evaluation. No evidence of intraluminal filling defect to suggest pulmonary embolism. Main pulmonary artery is normal in caliber. Mediastinum: No evidence of mediastinal lymphadenopathy. The heart and pericardium demonstrate no acute abnormality. There is no acute abnormality of the thoracic aorta. Lungs/pleura: The central airways are clear. Bibasilar mild dependent changes. No consolidation or pulmonary edema.  No evidence of pleural effusion or pneumothorax. There is an 8 mm right lower lobe pulmonary nodule on image 64 series 4. Upper Abdomen: No focal adrenal nodules. There is an exophytic right upper pole renal cortical 14 mm cyst. Limited images of the upper abdomen demonstrate no acute abnormality. Soft Tissues/Bones: No acute bone or soft tissue abnormality. No evidence of pulmonary embolism. 8 mm right lower lobe subpleural pulmonary nodule. Please see follow-up recommendations below. RECOMMENDATIONS: 8 mm right solid pulmonary nodule. Recommend a non-contrast Chest CT at 6-12 months. If patient is high risk for malignancy, recommend an additional non-contrast Chest CT at 18-24 months; if patient is low risk for malignancy a non-contrast Chest CT at 18-24 months is optional. These guidelines do not apply to immunocompromised patients and patients with cancer. Follow up in patients with significant comorbidities as clinically warranted. For lung cancer screening, adhere to Lung-RADS guidelines. Reference: Radiology. 2017; 284(1):228-43. ED COURSE & MEDICAL DECISION MAKING      Patient presents as above. Emergent etiologies considered. Patient is presenting with left upper back pain that is worse with breathing, movement. She states developed over the last 2 days. Not getting better with conservative management. Locates it into the medial scapular area. Is reproducible on exam but also states it hurts when she is breathing. She denies any chest pain or shortness of breath, no palpitations, no lightheadedness or dizziness, no syncope or near syncope. Patient does admit that she has a cyst on her left breast that she is being evaluated for fibrocystic breast disease. Also tells me about her chronic kidney disease as she has been closely followed by her nephrologist.  Has seen Dr. Dania Magallon. Has difficulty controlling her blood pressure. Also has a component of fibromyalgia.   Overall she appears stable, she is hypertensive on initial evaluation, afebrile, pulse of 64 100% on room air. Secondary to her medical history, the pleuritic nature of her symptoms a broad work-up was initiated. Patient's EKG demonstrated no significant signs of ischemia, did have some nonspecific T wave inversions in the septal leads, see supervising physicians note. No arrhythmia. Patient's initial chest x-ray as well as left shoulder x-ray were acutely negative. Patient's kidney function around her baseline, had a GFR of 40. Patient had initial negative troponin. Did have an elevated D-dimer. Secondary to study we did obtain a CTA pulmonary study to rule out underlying pulmonary emboli and/or aortic thoracic dissection, the CTA was negative for acute pulmonary emboli, there was 8 mm right lower lobe subpleural pulmonary nodule, secondary to this finding we will provide cardiothoracic follow-up for protocol. Patient had a negative secondary troponin. We did provide fluids to the patient secondary to giving her IV dye and her kidney function. We did a obtain a Lidoderm patch. On reevaluation patient feeling better, no significant pain. At this point I have a low suspicion for underlying ACS, acute intrathoracic pathology. I believe her symptoms are most likely musculoskeletal or coinciding with her history of fibromyalgia. We will discharge with conservative management, will encouraged to follow-up with primary care, orthopedics. We will also have her follow-up with cardiothoracic surgery regarding the incidental finding of a pulmonary nodule. Patient agrees to return emergency department if symptoms worsen or any new symptoms develop. Vital signs and nursing notes reviewed during ED course. All pertinent Lab data and radiographic results reviewed with patient at bedside.   The patient and/or the family were informed of the results of any tests/labs/imaging, the treatment plan, and time was allotted to answer questions. Clinical  IMPRESSION    1. Upper back pain on left side    2. Pulmonary nodule, right               Comment: Please note this report has been produced using speech recognition software and may contain errors related to that system including errors in grammar, punctuation, and spelling, as well as words and phrases that may be inappropriate. If there are any questions or concerns please feel free to contact the dictating provider for clarification.                         Christina Naik, PA  07/06/21 [id number]

## 2021-07-06 NOTE — ED PROVIDER NOTES
EKG Interpretation    Interpreted by emergency department physician    Rhythm: normal sinus   Rate: normal  Axis: normal  Ectopy: none  Conduction: normal  ST Segments: normal  T Waves: normal  Q Waves: v1,v2    Clinical Impression: Normal sinus rhythm with Q waves in septal leads    MD Rick Hunter MD  07/06/21 [id number]

## 2021-07-17 PROBLEM — R91.1 PULMONARY NODULE: Status: ACTIVE | Noted: 2021-07-17

## 2021-07-28 ENCOUNTER — HOSPITAL ENCOUNTER (OUTPATIENT)
Dept: DIABETES SERVICES | Age: 76
Setting detail: THERAPIES SERIES
Discharge: HOME | End: 2021-07-28
Payer: MEDICARE

## 2021-07-28 PROCEDURE — 97802 MEDICAL NUTRITION INDIV IN: CPT

## 2021-07-29 ENCOUNTER — TELEPHONE (OUTPATIENT)
Dept: CARDIOLOGY CLINIC | Age: 76
End: 2021-07-29

## 2021-07-29 NOTE — TELEPHONE ENCOUNTER
Patient called she stated for over a week she has been   Woken with leg pain / spasms also her arms   Are aching as well

## 2021-07-30 NOTE — PROGRESS NOTES
Outpatient Medical Nutrition Therapy   07/28/2021 12:00-13:00    Reason for referral: pre-diabetes (R73.03), elevated glucose (R73.9)    Client History:    Pertinent medications:   Current Outpatient Medications   Medication Instructions    acetaminophen (TYLENOL) 650 mg, Oral, EVERY 6 HOURS PRN    ALPRAZolam (XANAX) 1.5 mg, Oral, 3 TIMES DAILY    aspirin 81 mg, Oral, EVERY MORNING, Over The Counter     atenolol (TENORMIN) 25 mg, Oral, DAILY, Dose decrease as of 4/15/2013    clobetasol (TEMOVATE) 0.05 % cream Apply twice daily    digoxin (LANOXIN) 125 mcg, Oral, DAILY    DULoxetine (CYMBALTA) 60 mg, Oral, DAILY    folic acid (FOLVITE) 1 mg, Oral, DAILY    furosemide (LASIX) 40 mg, Oral, SEE ADMIN INSTRUCTIONS, ONLY ON MWF    gabapentin (NEURONTIN) 250 MG/5ML solution Take 4 mL by mouth daily.  Multiple Vitamin (MULTIVITAMIN PO) 1 tablet, Oral, EVERY MORNING, Over The Counter    nortriptyline (PAMELOR) 10 MG/5ML solution Take 2 ml by mouth nightly (clarification as of 8/2/2012)- please give 30 day supply with 3 refills    Probiotic Product (PROBIOTIC-10 PO) 1 tablet, Oral, DAILY      Social hx: Lives alone in independent living apartment past 5 years. 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 Pertinent Medical hx: fibromyalgia, HLD, CKD    Activity habits: Reports no routine exercise for entire life. No activity limitations reported but hx heart murmur as a child and got used to not exercising. Food/nutrition habits: Eats 3 meals each day. Some meals provided at independent living dining room. Breakfast often 2 slices toast, smart balance spread and all fruit preserves. Drinks decaf coffee with sugar sub. May have 1/4-1/2 cup orange juice. Drinks tea with sugar sub. Eating less meat in the past weeks. Likes frozen yogurt but not much dairy otherwise. Eats sugar free cookies for some snacks. Biochemical data:  May 2021- HbA1c6.1%   2020 - Cr-1.3, glucose 126, Trig-308    Anthropometrics:    Ht:61\" Wt:121# (home scale)  IBW: 115#  % of IBW: 105          BMI: 22.9, normal/healthy    Weight hx: Stable weight over adult life, recent loss with changes down from 125#. Diet hx: prior MNT due to hx HLD     Estimated needs: 7532-1621 calories/day (25-30 alia/kg current weight)    Nutrition dx: nutrition-related knowledge deficit related to limited exposure to meal plan as evidenced by recent dx pre-DM, patient with desire to learn about meal plan    Nutrition Prescription:  Consistent meal pattern with balanced meals-CHO, protein included at meals, CHO 30-45 g/meal based on 1500 calories per day    Nutrition Interventions: discussion regarding current food choices/meal habits. Generally reasonable intake but some concern for limited protein. Reviewed sample menus with balanced meals. Wanted to know amount of daily calories needed in order to track intake. Plans to obtain food dictionary for nutrition data for food records. Encouraged not to continue with weight loss, needs maintenance or will be considered underweight for age.      Nutrition related goals: continue to eat 3 melas each day, monitor/track daily intake as needed, plate model for meal planning     Adherence/barriers to goals: no barriers    Primary learner: attended alone  Education materials provided: sample menus, general healthful diet handouts from Nutrition Care Manual     Method of education:   Explanation      Handouts   Teach back     Response to education:    Verbalized understanding             Rec/plan: patient to continue follow up with PCP   Additional follow up as needed/desires, contact number provided

## 2021-08-23 RX ORDER — ATENOLOL 50 MG/1
25 TABLET ORAL DAILY
Qty: 15 TABLET | Refills: 5 | Status: SHIPPED | OUTPATIENT
Start: 2021-08-23 | End: 2022-02-22 | Stop reason: SDUPTHER

## 2021-08-27 ENCOUNTER — HOSPITAL ENCOUNTER (OUTPATIENT)
Age: 76
Setting detail: SPECIMEN
Discharge: HOME | End: 2021-08-27
Payer: MEDICARE

## 2021-08-27 LAB
ALBUMIN SERPL-MCNC: 4 GM/DL (ref 3.4–5)
ALP BLD-CCNC: 84 IU/L (ref 40–128)
ALT SERPL-CCNC: 11 U/L (ref 10–40)
ANION GAP SERPL CALCULATED.3IONS-SCNC: 12 MMOL/L (ref 4–16)
AST SERPL-CCNC: 16 IU/L (ref 15–37)
BILIRUB SERPL-MCNC: 0.3 MG/DL (ref 0–1)
BUN BLDV-MCNC: 32 MG/DL (ref 6–23)
CALCIUM SERPL-MCNC: 9.2 MG/DL (ref 8.3–10.6)
CHLORIDE BLD-SCNC: 104 MMOL/L (ref 99–110)
CHOLESTEROL: 196 MG/DL
CO2: 28 MMOL/L (ref 21–32)
CREAT SERPL-MCNC: 1.5 MG/DL (ref 0.6–1.1)
ESTIMATED AVERAGE GLUCOSE: 126 MG/DL
GFR AFRICAN AMERICAN: 41 ML/MIN/1.73M2
GFR NON-AFRICAN AMERICAN: 34 ML/MIN/1.73M2
GLUCOSE BLD-MCNC: 138 MG/DL (ref 70–99)
HBA1C MFR BLD: 6 % (ref 4.2–6.3)
HDLC SERPL-MCNC: 32 MG/DL
LDL CHOLESTEROL DIRECT: 134 MG/DL
POTASSIUM SERPL-SCNC: 4 MMOL/L (ref 3.5–5.1)
SODIUM BLD-SCNC: 144 MMOL/L (ref 135–145)
TOTAL PROTEIN: 5.9 GM/DL (ref 6.4–8.2)
TRIGL SERPL-MCNC: 174 MG/DL
TSH HIGH SENSITIVITY: 2.37 UIU/ML (ref 0.27–4.2)
VITAMIN B-12: 512 PG/ML (ref 211–911)
VITAMIN D 25-HYDROXY: 48.47 NG/ML

## 2021-08-27 PROCEDURE — 80053 COMPREHEN METABOLIC PANEL: CPT

## 2021-08-27 PROCEDURE — 82607 VITAMIN B-12: CPT

## 2021-08-27 PROCEDURE — 36415 COLL VENOUS BLD VENIPUNCTURE: CPT

## 2021-08-27 PROCEDURE — 83036 HEMOGLOBIN GLYCOSYLATED A1C: CPT

## 2021-08-27 PROCEDURE — 82306 VITAMIN D 25 HYDROXY: CPT

## 2021-08-27 PROCEDURE — 83721 ASSAY OF BLOOD LIPOPROTEIN: CPT

## 2021-08-27 PROCEDURE — 84443 ASSAY THYROID STIM HORMONE: CPT

## 2021-08-27 PROCEDURE — 80061 LIPID PANEL: CPT

## 2021-09-24 ENCOUNTER — HOSPITAL ENCOUNTER (OUTPATIENT)
Age: 76
Setting detail: SPECIMEN
Discharge: HOME | End: 2021-09-24
Payer: MEDICARE

## 2021-09-24 LAB
ANION GAP SERPL CALCULATED.3IONS-SCNC: 10 MMOL/L (ref 4–16)
BUN BLDV-MCNC: 32 MG/DL (ref 6–23)
CALCIUM SERPL-MCNC: 9 MG/DL (ref 8.3–10.6)
CHLORIDE BLD-SCNC: 98 MMOL/L (ref 99–110)
CO2: 30 MMOL/L (ref 21–32)
CREAT SERPL-MCNC: 1.3 MG/DL (ref 0.6–1.1)
GFR AFRICAN AMERICAN: 48 ML/MIN/1.73M2
GFR NON-AFRICAN AMERICAN: 40 ML/MIN/1.73M2
GLUCOSE BLD-MCNC: 100 MG/DL (ref 70–99)
POTASSIUM SERPL-SCNC: 4.4 MMOL/L (ref 3.5–5.1)
SODIUM BLD-SCNC: 138 MMOL/L (ref 135–145)

## 2021-09-24 PROCEDURE — 36415 COLL VENOUS BLD VENIPUNCTURE: CPT

## 2021-09-24 PROCEDURE — 80048 BASIC METABOLIC PNL TOTAL CA: CPT

## 2022-02-05 ENCOUNTER — HOSPITAL ENCOUNTER (OUTPATIENT)
Age: 77
Setting detail: SPECIMEN
Discharge: HOME | End: 2022-02-05
Payer: MEDICARE

## 2022-02-05 LAB
ANION GAP SERPL CALCULATED.3IONS-SCNC: 8 MMOL/L (ref 4–16)
BUN BLDV-MCNC: 26 MG/DL (ref 6–23)
CALCIUM SERPL-MCNC: 8.9 MG/DL (ref 8.3–10.6)
CHLORIDE BLD-SCNC: 105 MMOL/L (ref 99–110)
CO2: 32 MMOL/L (ref 21–32)
CREAT SERPL-MCNC: 1.4 MG/DL (ref 0.6–1.1)
GFR AFRICAN AMERICAN: 44 ML/MIN/1.73M2
GFR NON-AFRICAN AMERICAN: 36 ML/MIN/1.73M2
GLUCOSE BLD-MCNC: 110 MG/DL (ref 70–99)
POTASSIUM SERPL-SCNC: 4.5 MMOL/L (ref 3.5–5.1)
SODIUM BLD-SCNC: 145 MMOL/L (ref 135–145)

## 2022-02-05 PROCEDURE — 36415 COLL VENOUS BLD VENIPUNCTURE: CPT

## 2022-02-05 PROCEDURE — 80048 BASIC METABOLIC PNL TOTAL CA: CPT

## 2022-02-22 RX ORDER — ATENOLOL 50 MG/1
25 TABLET ORAL DAILY
Qty: 45 TABLET | Refills: 1 | Status: SHIPPED | OUTPATIENT
Start: 2022-02-22 | End: 2022-05-23 | Stop reason: SDUPTHER

## 2022-02-28 ENCOUNTER — OFFICE VISIT (OUTPATIENT)
Dept: OBGYN | Age: 77
End: 2022-02-28
Payer: MEDICARE

## 2022-02-28 VITALS
HEIGHT: 62 IN | BODY MASS INDEX: 22.45 KG/M2 | WEIGHT: 122 LBS | SYSTOLIC BLOOD PRESSURE: 118 MMHG | DIASTOLIC BLOOD PRESSURE: 58 MMHG

## 2022-02-28 DIAGNOSIS — R10.2 PELVIC PAIN: ICD-10-CM

## 2022-02-28 DIAGNOSIS — Z01.419 WOMEN'S ANNUAL ROUTINE GYNECOLOGICAL EXAMINATION: Primary | ICD-10-CM

## 2022-02-28 PROCEDURE — 99387 INIT PM E/M NEW PAT 65+ YRS: CPT

## 2022-02-28 SDOH — ECONOMIC STABILITY: TRANSPORTATION INSECURITY
IN THE PAST 12 MONTHS, HAS THE LACK OF TRANSPORTATION KEPT YOU FROM MEDICAL APPOINTMENTS OR FROM GETTING MEDICATIONS?: NO

## 2022-02-28 SDOH — ECONOMIC STABILITY: TRANSPORTATION INSECURITY
IN THE PAST 12 MONTHS, HAS LACK OF TRANSPORTATION KEPT YOU FROM MEETINGS, WORK, OR FROM GETTING THINGS NEEDED FOR DAILY LIVING?: NO

## 2022-02-28 SDOH — ECONOMIC STABILITY: HOUSING INSECURITY
IN THE LAST 12 MONTHS, WAS THERE A TIME WHEN YOU DID NOT HAVE A STEADY PLACE TO SLEEP OR SLEPT IN A SHELTER (INCLUDING NOW)?: NO

## 2022-02-28 SDOH — ECONOMIC STABILITY: INCOME INSECURITY: IN THE LAST 12 MONTHS, WAS THERE A TIME WHEN YOU WERE NOT ABLE TO PAY THE MORTGAGE OR RENT ON TIME?: NO

## 2022-02-28 SDOH — ECONOMIC STABILITY: FOOD INSECURITY: WITHIN THE PAST 12 MONTHS, THE FOOD YOU BOUGHT JUST DIDN'T LAST AND YOU DIDN'T HAVE MONEY TO GET MORE.: NEVER TRUE

## 2022-02-28 SDOH — ECONOMIC STABILITY: FOOD INSECURITY: WITHIN THE PAST 12 MONTHS, YOU WORRIED THAT YOUR FOOD WOULD RUN OUT BEFORE YOU GOT MONEY TO BUY MORE.: NEVER TRUE

## 2022-02-28 ASSESSMENT — SOCIAL DETERMINANTS OF HEALTH (SDOH): HOW HARD IS IT FOR YOU TO PAY FOR THE VERY BASICS LIKE FOOD, HOUSING, MEDICAL CARE, AND HEATING?: NOT HARD AT ALL

## 2022-02-28 ASSESSMENT — ENCOUNTER SYMPTOMS
GASTROINTESTINAL NEGATIVE: 1
ABDOMINAL PAIN: 0
RESPIRATORY NEGATIVE: 1

## 2022-02-28 NOTE — PROGRESS NOTES
22    Dayanara Lemus  1945    Chief Complaint   Patient presents with   Abbykimi Gold Gynecologic Exam     pt here for annual, had hyster, hrt-none, pap-unsur-normal, mammo  @ [facility], dexa- @ , [facility], colonoscopy--.  Abdominal Pain     pt c/o abdominal pain x 1 month, dull, radiates into lower back, thighs and groin area and nausea. The patient is a 64 y.o. female,  who presents for her annual exam. She is menopausal.  She is not taking HRT. Katherine Tovar She reports additional symptoms of pelvic pain and lower abdominal pain that radiates up underneath her ribs and to her back. She has had a hysterectomy with removal of ovaries. (pt unsure if she had ovaries removed or not)    Pt states lower abdominal/pelvic pain started in early 2022. She denies sharp or cramp-like pain, states it feels more like a pressure or a heaviness. She says she also feels an ache in her upper thighs, beneath her ribcage, radiates to her back. She is closely following with urology, has a cystoscope scheduled in March. Pt reports known cysts on kidneys as well, has an upcoming ultrasound scheduled. She is also closely following with PCP. Pt states she was treated for a UTI with Macrobid recently, pain has still persisted. Denies fever, headache, urinary symptoms, vomiting. She is  sexually active. Pap smear history: Her last PAP smear was in unsure. Her results were normal.    Breast history: her most recent mammogram was in . The results were: Normal    Osteoporosis Status: her bone density scan was in . The results were normal bone density    Colonoscopy Status: she had a colonoscopy in 2019.   The results were normal.    Past Medical History:   Diagnosis Date    Abdominal pain     Anemia     Angioedema 2014    APC (atrial premature contractions) 2012    Infrequent APC's-Dr Bobo Flores Arrhythmia 2014    Arthritis     Brain aneurysm      [facility] Dr Dom Mullen Marita    CKD (chronic kidney disease)     Sees Dr. Aramis Barry- \"stage 3\"    Depression with anxiety 2014    Dr. Anna Parmar managing xanax and cymbalta    Diabetes mellitus ([facility] [address]     \"pre- diabetic\"\"no medication yet\" per pt on 4/19/2018    Difficulty swallowing     \"Large Pills\"   [address]\"have fibromyalgia in all 18 trigger points and my hands an feet are swollen all the time- been to [facility] 4 times for this\"    GE reflux 2006    GERD (gastroesophageal reflux disease)     Groin pain     H/O cardiovascular stress test 2-3-12    2-3-12, EF 70 % normal study    H/O echocardiogram 2-3-12    2-3-12 EF 55%, normal test    Hemorrhoids     + colonoscopy 2011 Dr. Vera Flaherty History of 24 hour EKG monitoring 2-7-12    48 hour holter, predonimant rhythm is SInus rhythum, Max  and min HR 64, infrequent PVC's and APC's.  [place] (hard of hearing)     Bilateral Hearing Aids    Hx of cardiovascular stress test 6/12/2015    lexiscan-mild ischemia apical,EF70%    Hx of cardiovascular stress test 01/30/2019    Normal Lexiscan nuclear scintigraphic study suggestive of normal myocardial perfusion. Gated images demonstrate normal left ventricular systolic function with EF of 60 %.     Hyperlipidemia     Hypertension     follows with Dr Madai Crowell    IBS (irritable bowel syndrome) 7/7/2013    Dr. Anitha Monson, IBS diet, immodium prn (2013)    [facility] Internal hemorrhoids 2011    Dr. Lis Otero Interstitial cystitis     Dr. Jami Hoffman with cath    Lower back pain     MVP (mitral valve prolapse)     Nausea     Palpitations     Following with  Dr. Ny Carbajal, and on digoxin    Pap smear for cervical cancer screening     Dr. Gayla Fermin    [facility] Pulmonary nodule     9/14 CT chest 8mm nodule, no change, recheck 9/15    PVC (premature ventricular contraction) 2/2012    Infrequent PVC's-Dr. Chela Celaya    Right inguinal pain     referred from Dr. Wheatley Postal to Dr. Alex Ashby then PT ( no hernia)    Shortness of breath on exertion     Skin cancer of nose In Past    Dr. Sofie Gallegos,  previously see Dr. Gina Wong Teeth missing     Upper And Lower    Thigh pain     UTI (urinary tract infection) 03/2018    Vitamin D deficiency     Wears glasses     Wears partial dentures     Upper       Past Surgical History:   Procedure Laterality Date    BLADDER SURGERY  2000, 2008    Dr. Darius Munson- dilatation    Cole Andrea  2011    Dr. Lacie Das - [facility] in [place];Grade 1 internal hemorrhoids    DENTAL SURGERY      Teeth Extracted In Past    ENDOSCOPY, COLON, DIAGNOSTIC  In Past    EYE SURGERY Left 11/2017    Cataract With Lens Implant    HYSTERECTOMY, TOTAL ABDOMINAL  1/14    \"took everything    SHOULDER ARTHROSCOPY Right 11/2017    rotator cuff repair, SAd, distal clavicle excision, extensive debridement    SKIN CANCER EXCISION  In Past    Nose    TONSILLECTOMY  26's       Family History   Problem Relation Age of Onset    Mental Illness Son    Fede Outhouse Arthritis Mother     Hearing Loss Mother     Heart Disease Mother         \"Aneurysm In [place]"    Stroke Father     Heart Attack Father     Heart Disease Father         Heart Attack    Diabetes Father     Other Brother         \"Colon Polyps\"    Heart Disease Brother         \"Heart Surgery\"       Social History     Tobacco Use    Smoking status: Never Smoker    Smokeless tobacco: Never Used   Vaping Use    Vaping Use: Never used   Substance Use Topics    Alcohol use: No    Drug use: No       Current Outpatient Medications   Medication Sig Dispense Refill    zinc oxide 13 % CREA Apply topically 2 times daily as needed for Rash 454 g 1    atenolol (TENORMIN) 50 MG tablet Take 0.5 tablets by mouth daily Dose decrease as of 4/15/2013 45 tablet 1    furosemide (LASIX) 40 MG tablet TAKE ONE (1) TABLET BY MOUTH AS DIRECTED ON MON-WED-FRI 50 tablet 3    digoxin (LANOXIN) 125 MCG tablet Take 1 tablet by mouth daily High Dose (Fluzone 65 yrs and older) 11/19/2015, 10/03/2016, 10/03/2017, 10/03/2020    Pneumococcal Conjugate 13-valent Lendell Syed) 11/19/2015, 10/03/2017    Pneumococcal Polysaccharide ([id number]) 12/20/2011       Review of Systems   Constitutional: Negative. Negative for fatigue and fever. Respiratory: Negative. Gastrointestinal: Negative. Negative for abdominal pain. Endocrine: Negative. Genitourinary: Positive for pelvic pain. Negative for dysuria, menstrual problem, vaginal bleeding, vaginal discharge and vaginal pain. Musculoskeletal: Negative. Skin: Negative. Neurological: Negative. Negative for dizziness and headaches. Psychiatric/Behavioral: Negative. BP (!) 118/58   Ht 5' 1.5\" (1.562 m)   Wt 122 lb (55.3 kg)   BMI 22.68 kg/m²     Physical Exam  Vitals and nursing note reviewed. Constitutional:       General: She is not in acute distress. Appearance: Normal appearance. She is normal weight. HENT:      Head: Normocephalic and atraumatic. Pulmonary:      Effort: Pulmonary effort is normal. No respiratory distress. Chest:   Breasts:      Right: Normal. No axillary adenopathy or supraclavicular adenopathy. Left: Normal. No axillary adenopathy or supraclavicular adenopathy. Abdominal:      Palpations: Abdomen is soft. Tenderness: There is no abdominal tenderness. Genitourinary:     General: Normal vulva. Exam position: Lithotomy position. Vagina: Normal.   Musculoskeletal:         General: Normal range of motion. Cervical back: Normal range of motion. Lymphadenopathy:      Upper Body:      Right upper body: No supraclavicular or axillary adenopathy. Left upper body: No supraclavicular or axillary adenopathy. Skin:     General: Skin is warm and dry. Findings: No rash. Neurological:      General: No focal deficit present. Mental Status: She is alert and oriented to person, place, and time.    Psychiatric: Mood and Affect: Mood normal.         Behavior: Behavior normal.         Thought Content: Thought content normal.         No results found for this visit on 02/28/22. Assessment and Plan   Diagnosis Orders   1. Women's annual routine gynecological examination  DEXA BONE DENSITY AXIAL SKELETON    ARLENE DIGITAL SCREEN W OR WO CAD BILATERAL    zinc oxide 13 % CREA   2. Pelvic pain  Vaginal Pathogens Probes *A    C.trachomatis N.gonorrhoeae DNA, Urine    Culture, Urine     Dexa, mammogram order. Swabs and urine culture today    Discussed ruling out any infectious cause of pain, yeast, BV from a gynecologic perspective. Also discussed potential value of an ultrasound eventually, although she has so many imaging appointments currently scheduled it might be best to wait at this time, particularly because of hysterectomy. Encouraged continued regular follow-up with PCP, urology, other specialists the pt regularly visits. All questions/concerns addressed. Return if symptoms worsen or fail to improve.     Yecenia Rudolph PA-C

## 2022-03-01 LAB
C. TRACHOMATIS DNA ,URINE: NEGATIVE
CANDIDA SPECIES, DNA PROBE: NORMAL
GARDNERELLA VAGINALIS, DNA PROBE: NORMAL
N. GONORRHOEAE DNA, URINE: NEGATIVE
TRICHOMONAS VAGINALIS DNA: NORMAL
URINE CULTURE, ROUTINE: NORMAL

## 2022-05-19 ENCOUNTER — OFFICE VISIT (OUTPATIENT)
Dept: OBGYN | Age: 77
End: 2022-05-19
Payer: MEDICARE

## 2022-05-19 VITALS
BODY MASS INDEX: 23.11 KG/M2 | DIASTOLIC BLOOD PRESSURE: 52 MMHG | SYSTOLIC BLOOD PRESSURE: 99 MMHG | WEIGHT: 122.4 LBS | HEIGHT: 61 IN

## 2022-05-19 DIAGNOSIS — N95.2 POST-MENOPAUSAL ATROPHIC VAGINITIS: ICD-10-CM

## 2022-05-19 DIAGNOSIS — N89.8 VAGINAL ITCHING: Primary | ICD-10-CM

## 2022-05-19 PROCEDURE — 99213 OFFICE O/P EST LOW 20 MIN: CPT

## 2022-05-19 ASSESSMENT — PATIENT HEALTH QUESTIONNAIRE - PHQ9
5. POOR APPETITE OR OVEREATING: 0
6. FEELING BAD ABOUT YOURSELF - OR THAT YOU ARE A FAILURE OR HAVE LET YOURSELF OR YOUR FAMILY DOWN: 0
SUM OF ALL RESPONSES TO PHQ QUESTIONS 1-9: 0
3. TROUBLE FALLING OR STAYING ASLEEP: 0
2. FEELING DOWN, DEPRESSED OR HOPELESS: 0
SUM OF ALL RESPONSES TO PHQ QUESTIONS 1-9: 0
7. TROUBLE CONCENTRATING ON THINGS, SUCH AS READING THE NEWSPAPER OR WATCHING TELEVISION: 0
10. IF YOU CHECKED OFF ANY PROBLEMS, HOW DIFFICULT HAVE THESE PROBLEMS MADE IT FOR YOU TO DO YOUR WORK, TAKE CARE OF THINGS AT HOME, OR GET ALONG WITH OTHER PEOPLE: 0
9. THOUGHTS THAT YOU WOULD BE BETTER OFF DEAD, OR OF HURTING YOURSELF: 0
SUM OF ALL RESPONSES TO PHQ QUESTIONS 1-9: 0
SUM OF ALL RESPONSES TO PHQ QUESTIONS 1-9: 0
4. FEELING TIRED OR HAVING LITTLE ENERGY: 0
8. MOVING OR SPEAKING SO SLOWLY THAT OTHER PEOPLE COULD HAVE NOTICED. OR THE OPPOSITE, BEING SO FIGETY OR RESTLESS THAT YOU HAVE BEEN MOVING AROUND A LOT MORE THAN USUAL: 0

## 2022-05-19 ASSESSMENT — ENCOUNTER SYMPTOMS
RESPIRATORY NEGATIVE: 1
GASTROINTESTINAL NEGATIVE: 1
ABDOMINAL PAIN: 0

## 2022-05-19 NOTE — PROGRESS NOTES
5/19/22    Imelda Aquino  1945    Chief Complaint   Patient presents with    Follow-up     pt here to f/u on vaginal discomfort, pt is currently using estradiol crram and states that it does not help in any aspect, pt is alos using a zinc compound cream along w/ over the counter bag balm, pt states she is still experincing vaginal dryness and discomfort. Imelda Aquino is a 64 y.o. female who presents today for evaluation of vaginal itching and discomfort on and off since February. Pt states she uses a zinc oxide compound cream from previous provider that has always been helpful for vaginal dryness, as well as bag balm. She recently had a bladder infection and was on two different courses of antibiotics throughout February/March, and was also given a topical estradiol agent for vaginal dryness at an appointment during that period. She states she began using it but did not notice sufficient difference in symptoms. She is unsure if this is also a contributor to vaginal discomfort. Denies vaginal bleeding, pelvic pain.      Past Medical History:   Diagnosis Date    Abdominal pain     Anemia     Angioedema 4/7/2014    APC (atrial premature contractions) 2/2012    Infrequent APC's-Dr Dhruv Kapoor Arrhythmia 4/7/2014    Arthritis     Brain aneurysm      referred to [facility] Dr Shwetha Zapata    CKD (chronic kidney disease)     Sees Dr. Apolinar Starr- \"stage 3\"    Depression with anxiety 2014    Dr. Sarah Gloria managing xanax and cymbalta    Diabetes mellitus ([facility] [address]     \"pre- diabetic\"\"no medication yet\" per pt on 4/19/2018    Difficulty swallowing     \"Large Pills\"   [address]\"have fibromyalgia in all 18 trigger points and my hands an feet are swollen all the time- been to [facility] 4 times for this\"    GE reflux 2006    GERD (gastroesophageal reflux disease)     Groin pain     H/O cardiovascular stress test 2-3-12    2-3-12, EF 70 % normal study    H/O echocardiogram 2-3-12    2-3-12 EF 55%, normal test    Hemorrhoids     + colonoscopy 2011 Dr. Fartun Dugan History of 24 hour EKG monitoring 2-7-12    48 hour holter, predonimant rhythm is SInus rhythum, Max  and min HR 64, infrequent PVC's and APC's.  [place] (hard of hearing)     Bilateral Hearing Aids    Hx of cardiovascular stress test 6/12/2015    lexiscan-mild ischemia apical,EF70%    Hx of cardiovascular stress test 01/30/2019    Normal Lexiscan nuclear scintigraphic study suggestive of normal myocardial perfusion. Gated images demonstrate normal left ventricular systolic function with EF of 60 %.     Hyperlipidemia     Hypertension     follows with Dr Nader Foley    IBS (irritable bowel syndrome) 7/7/2013    Dr. Priscila Kwong, IBS diet, immodium prn (2013)    [facility] Internal hemorrhoids 2011    Dr. Harsha Alexander Interstitial cystitis     Dr. Isaiah Quiñones with cath    Lower back pain     MVP (mitral valve prolapse)     Nausea     Palpitations     Following with  Dr. Gwen Trevino, and on digoxin    Pap smear for cervical cancer screening     Dr. Mariaelena North     Pulmonary nodule     9/14 CT chest 8mm nodule, no change, recheck 9/15    PVC (premature ventricular contraction) 2/2012    Infrequent PVC's-Dr. Ke Walters    Right inguinal pain     referred from Dr. Sameera Masters to Dr. Radhika Hopson then PT ( no hernia)    Shortness of breath on exertion     Skin cancer of nose In Past    Dr. Ren Young,  previously see Dr. Drew Polanco Teeth missing     Upper And Lower    Thigh pain     UTI (urinary tract infection) 03/2018    Vitamin D deficiency     Wears glasses     Wears partial dentures     Upper       Past Surgical History:   Procedure Laterality Date    BLADDER SURGERY  2000, 2008    Dr. Isaiah Quiñones- dilatation    Binnie Quant    Dr. Marcia Tipton - Digestive Specialist in [place];Grade 1 internal hemorrhoids    DENTAL SURGERY      Teeth Extracted In Past    ENDOSCOPY, COLON, DIAGNOSTIC  In Past  EYE SURGERY Left 11/2017    Cataract With Lens Implant    HYSTERECTOMY, TOTAL ABDOMINAL  1/14    \"took everything    SHOULDER ARTHROSCOPY Right 11/2017    rotator cuff repair, SAd, distal clavicle excision, extensive debridement    SKIN CANCER EXCISION  In Past    Nose    TONSILLECTOMY  1960's       Social History     Tobacco Use    Smoking status: Never Smoker    Smokeless tobacco: Never Used   Vaping Use    Vaping Use: Never used   Substance Use Topics    Alcohol use: No    Drug use: No       Family History   Problem Relation Age of Onset    Mental Illness Son    [facility] Arthritis Mother     Hearing Loss Mother     Heart Disease Mother         \"Aneurysm In [place]"    Stroke Father     Heart Attack Father     Heart Disease Father         Heart Attack    Diabetes Father     Other Brother         \"Colon Polyps\"    Heart Disease Brother         \"Heart Surgery\"       Current Outpatient Medications   Medication Sig Dispense Refill    zinc oxide 13 % CREA Apply topically 2 times daily as needed for Rash 454 g 1    atenolol (TENORMIN) 50 MG tablet Take 0.5 tablets by mouth daily Dose decrease as of 4/15/2013 45 tablet 1    furosemide (LASIX) 40 MG tablet TAKE ONE (1) TABLET BY MOUTH AS DIRECTED ON MON-WED-FRI 50 tablet 3    digoxin (LANOXIN) 125 MCG tablet Take 1 tablet by mouth daily (Patient taking differently: Take 125 mcg by mouth daily Patient take 1 tab every other day and 1/2 tab on opposite days) 90 tablet 3    DULoxetine (CYMBALTA) 60 MG extended release capsule Take 60 mg by mouth daily      Probiotic Product (PROBIOTIC-10 PO) Take 1 tablet by mouth daily       folic acid (FOLVITE) 1 MG tablet Take 1 mg by mouth daily       aspirin 81 MG EC tablet Take 81 mg by mouth every morning Over The Counter      ALPRAZolam (XANAX) 0.5 MG tablet Take 1.5 mg by mouth 3 times daily.        clobetasol (TEMOVATE) 0.05 % cream Apply twice daily 60 g 3    nortriptyline (PAMELOR) 10 MG/5ML solution Take 2 ml by mouth nightly (clarification as of 2012)- please give 30 day supply with 3 refills (Patient taking differently: Take 5 mg by mouth nightly Take 2 ml by mouth nightly) 1 Bottle 5    gabapentin (NEURONTIN) 250 MG/5ML solution Take 4 mL by mouth daily. (Patient taking differently: Take 300 mg by mouth nightly. Take 4 mL by mouth daily.) 450 mL 3    Multiple Vitamin (MULTIVITAMIN PO) Take 1 tablet by mouth every morning Over The Counter       No current facility-administered medications for this visit. Allergies   Allergen Reactions    Demerol Rash    Pcn [Penicillins] Rash    Statins      \"Causes Muscle Aches And Pains\"    Sulfa Antibiotics Rash    Vicodin [Hydrocodone-Acetaminophen]      \"Dizziness\"    Zetia [Ezetimibe] Palpitations and Other (See Comments)     Heart palpitations  \"Heart Palpitations\"    Adhesive Tape Other (See Comments) and Rash     Muscles aches    Glycerin      \"tongue swells\"    Percocet [Oxycodone-Acetaminophen] Other (See Comments)     \"hallucination\"    Meperidine Rash    Prednisone Palpitations           Immunization History   Administered Date(s) Administered    COVID-19, Pfizer Purple top, DILUTE for use, 12+ yrs, 30mcg/0.3mL dose 2020, 2021, 10/18/2021    Influenza Vaccine, unspecified formulation 10/16/2018    Influenza, High Dose (Fluzone 65 yrs and older) 2015, 10/03/2016, 10/03/2017, 10/03/2020    Pneumococcal Conjugate 13-valent ([id number]) 2015, 10/03/2017    Pneumococcal Polysaccharide ([id number]) 2011       Review of Systems   Constitutional: Negative. Negative for fatigue and fever. Respiratory: Negative. Gastrointestinal: Negative. Negative for abdominal pain. Endocrine: Negative. Genitourinary: Negative. Negative for dysuria, frequency, menstrual problem, vaginal bleeding, vaginal discharge and vaginal pain. Musculoskeletal: Negative. Skin: Negative. Neurological: Negative.   Negative for dizziness and headaches. Psychiatric/Behavioral: Negative. BP (!) 99/52   Ht 5' 1\" (1.549 m)   Wt 122 lb 6.4 oz (55.5 kg)   BMI 23.13 kg/m²     Physical Exam  Vitals and nursing note reviewed. Constitutional:       General: She is not in acute distress. Appearance: Normal appearance. She is normal weight. HENT:      Head: Normocephalic and atraumatic. Pulmonary:      Effort: Pulmonary effort is normal. No respiratory distress. Abdominal:      Palpations: Abdomen is soft. Tenderness: There is no abdominal tenderness. Genitourinary:     General: Normal vulva. Exam position: Lithotomy position. Comments: Shiny, atrophic skin  Musculoskeletal:         General: Normal range of motion. Cervical back: Normal range of motion. Skin:     General: Skin is warm and dry. Neurological:      General: No focal deficit present. Mental Status: She is alert and oriented to person, place, and time. Psychiatric:         Mood and Affect: Mood normal.         Speech: Speech normal.         Behavior: Behavior normal.         Thought Content: Thought content normal.         No results found for this visit on 05/19/22. ASSESSMENT AND PLAN   Diagnosis Orders   1. Vaginal itching  Vaginal Pathogens Probes *A   2. Post-menopausal atrophic vaginitis       bv panel    Explained bv vs yeast and common triggers, such as antibiotics and vaginal creams    Pt will have pharmacist contact our office with specific formulation of zinc oxide compound cream to adequately provide refills    No other concerns/complaints today, all questions/concerns answered    Return if symptoms worsen or fail to improve.     Shavon Rudd PA-C

## 2022-05-20 LAB
CANDIDA SPECIES, DNA PROBE: NORMAL
GARDNERELLA VAGINALIS, DNA PROBE: NORMAL
TRICHOMONAS VAGINALIS DNA: NORMAL

## 2022-05-23 ENCOUNTER — OFFICE VISIT (OUTPATIENT)
Dept: CARDIOLOGY CLINIC | Age: 77
End: 2022-05-23
Payer: MEDICARE

## 2022-05-23 VITALS
HEART RATE: 71 BPM | BODY MASS INDEX: 22.6 KG/M2 | HEIGHT: 62 IN | SYSTOLIC BLOOD PRESSURE: 100 MMHG | DIASTOLIC BLOOD PRESSURE: 60 MMHG | WEIGHT: 122.8 LBS

## 2022-05-23 DIAGNOSIS — I10 ESSENTIAL HYPERTENSION: Primary | ICD-10-CM

## 2022-05-23 PROCEDURE — 99213 OFFICE O/P EST LOW 20 MIN: CPT | Performed by: INTERNAL MEDICINE

## 2022-05-23 RX ORDER — ATENOLOL 50 MG/1
25 TABLET ORAL DAILY
Qty: 45 TABLET | Refills: 3 | Status: SHIPPED | OUTPATIENT
Start: 2022-05-23 | End: 2022-07-18 | Stop reason: SDUPTHER

## 2022-05-23 RX ORDER — DIGOXIN 125 MCG
125 TABLET ORAL DAILY
Qty: 60 TABLET | Refills: 0 | Status: SHIPPED | OUTPATIENT
Start: 2022-05-23 | End: 2022-07-18 | Stop reason: SDUPTHER

## 2022-05-23 NOTE — PROGRESS NOTES
CARDIOLOGY NOTE      2022    RE: Maryse Fung  (1945)                               TO:  Dr. Navi Abbott MD            Sathya Gay is a 64 y.o. female who was seen today for management of hypertension                                    HPI:                   Pt has h/o hypertension arrhythmia, hyperlipidemia, seen today for follow-up.  Pt has no cardiac complaints    Mayrse Fung has the following history recorded in care path:  Patient Active Problem List    Diagnosis Date Noted    Essential hypertension 04/15/2013    Palpitations     Fibromyalgia 2012    Anemia 2011    Depression with anxiety 2011    Rash 04/15/2013    Fatigue 2012    Insomnia 2012    Vitamin D deficiency 2011    Lymphedema 2017    CKD (chronic kidney disease) stage 3, GFR 30-59 ml/min ([facility]) 2016    Benign essential hypertension 2016    Coronary artery disease involving native coronary artery of native heart without angina pectoris 2016    Anxiety 2016    Mixed hyperlipidemia 2015    Chronic kidney disease-mineral and bone disorder 2015    Arrhythmia 2014    Depression 2014    Chronic interstitial cystitis 2014    Angioedema 2014    IBS (irritable bowel syndrome) 2013    Pulmonary nodule 2021    Lipedema 2021    Kidney cysts 2021    Hypertensive renal disease 2020    Edema leg 2020    Chronic kidney disease, stage IV (severe) ([address] 10/15/2020    Stage 3b chronic kidney disease 2020    Acute cystitis without hematuria 2018    Brain aneurysm 2018    Depression, major, recurrent, moderate ([address] 2018    Edema of both lower legs due to peripheral venous insufficiency 2018    Altered mental status 2017    Complete tear of right rotator cuff     Labral tear of long head of right biceps tendon     DJD of right AC (acromioclavicular) joint     Right rotator cuff tear 01/01/2017     Current Outpatient Medications   Medication Sig Dispense Refill    zinc oxide 13 % CREA Apply topically 2 times daily as needed for Rash 454 g 1    atenolol (TENORMIN) 50 MG tablet Take 0.5 tablets by mouth daily Dose decrease as of 4/15/2013 45 tablet 1    furosemide (LASIX) 40 MG tablet TAKE ONE (1) TABLET BY MOUTH AS DIRECTED ON MON-WED-FRI 50 tablet 3    digoxin (LANOXIN) 125 MCG tablet Take 1 tablet by mouth daily (Patient taking differently: Take 125 mcg by mouth daily Patient take 1 tab every other day and 1/2 tab on opposite days) 90 tablet 3    DULoxetine (CYMBALTA) 60 MG extended release capsule Take 60 mg by mouth daily      Probiotic Product (PROBIOTIC-10 PO) Take 1 tablet by mouth daily       folic acid (FOLVITE) 1 MG tablet Take 1 mg by mouth daily       aspirin 81 MG EC tablet Take 81 mg by mouth every morning Over The Counter      ALPRAZolam (XANAX) 0.5 MG tablet Take 1.5 mg by mouth 3 times daily.  clobetasol (TEMOVATE) 0.05 % cream Apply twice daily 60 g 3    nortriptyline (PAMELOR) 10 MG/5ML solution Take 2 ml by mouth nightly (clarification as of 8/2/2012)- please give 30 day supply with 3 refills (Patient taking differently: Take 5 mg by mouth nightly Take 2 ml by mouth nightly) 1 Bottle 5    gabapentin (NEURONTIN) 250 MG/5ML solution Take 4 mL by mouth daily. (Patient taking differently: Take 300 mg by mouth nightly. Take 4 mL by mouth daily.) 450 mL 3    Multiple Vitamin (MULTIVITAMIN PO) Take 1 tablet by mouth every morning Over The Counter       No current facility-administered medications for this visit.      Allergies: Demerol, Pcn [penicillins], Statins, Sulfa antibiotics, Vicodin [hydrocodone-acetaminophen], Zetia [ezetimibe], Adhesive tape, Glycerin, Percocet [oxycodone-acetaminophen], Meperidine, and Prednisone  Past Medical History:   Diagnosis Date    Abdominal pain     Anemia     Angioedema 4/7/2014    APC (atrial premature contractions) 2/2012    Infrequent APC's-Dr Lennox Mars    Arrhythmia 4/7/2014    Arthritis     [facility]      [facility]    CKD (chronic kidney disease)     Sees Dr. Livan Alexandra- \"stage 3\"    Depression with anxiety 2014    Dr. Rama Landry managing xanax and cymbalta    Diabetes mellitus ([address]     \"pre- diabetic\"\"no medication yet\" per pt on 4/19/2018    Difficulty swallowing     \"Large Pills\"   [address]\"have fibromyalgia in all 18 trigger points and my hands an feet are swollen all the time- [facility]\"    GE reflux 2006    GERD (gastroesophageal reflux disease)     Groin pain     H/O cardiovascular stress test 2-3-12    2-3-12, EF 70 % normal study    H/O echocardiogram 2-3-12    2-3-12 EF 55%, normal test    Hemorrhoids     + colonoscopy 2011 Dr. Rodri Briceno History of 24 hour EKG monitoring 2-7-12    48 hour holter, predonimant rhythm is SInus rhythum, Max  and min HR 64, infrequent PVC's and APC's.  [place] (hard of hearing)     Bilateral Hearing Aids    Hx of cardiovascular stress test 6/12/2015    lexiscan-mild ischemia apical,EF70%    Hx of cardiovascular stress test 01/30/2019    Normal Lexiscan nuclear scintigraphic study suggestive of normal myocardial perfusion. Gated images demonstrate normal left ventricular systolic function with EF of 60 %.     Hyperlipidemia     Hypertension     follows with Dr Mynor Chambers    IBS (irritable bowel syndrome) 7/7/2013    Dr. Aliya Harrison, IBS diet, immodium prn (2013)     Internal hemorrhoids 2011    Dr. Gavin Honeycutt Interstitial cystitis     Dr. Nelson Bourne with cath    Lower back pain     MVP (mitral valve prolapse)     Nausea     Palpitations     Following with  Dr. William Parrish, and on digoxin    Pap smear for cervical cancer screening     Dr. Hank Hernandez    [facility] Pulmonary nodule     9/14 CT chest 8mm nodule, no change, recheck 9/15    PVC (premature ventricular contraction) 2/2012    Infrequent PVC's-Dr. Chago Painter    Right inguinal pain     referred from Dr. Redding to Dr. Shelly Peña then PT ( no hernia)    Shortness of breath on exertion     Skin cancer of nose In Past    Dr. nOel Rodriguez,  previously see Dr. Antonio Gibbons Teeth missing     Upper And Lower    Thigh pain     UTI (urinary tract infection) 03/2018    Vitamin D deficiency     Wears glasses     Wears partial dentures     Upper     Past Surgical History:   Procedure Laterality Date    BLADDER SURGERY  2000, 2008    Dr. Main Jara- dilatation    [place] [facility]  2011    Dr. Kiana Best - [facility] in [place];Grade 1 internal hemorrhoids    DENTAL SURGERY      Teeth Extracted In Past    ENDOSCOPY, COLON, DIAGNOSTIC  In Past    EYE SURGERY Left 11/2017    Cataract With Lens Implant    HYSTERECTOMY, TOTAL ABDOMINAL  1/14    \"took everything    SHOULDER ARTHROSCOPY Right 11/2017    rotator cuff repair, SAd, distal clavicle excision, extensive debridement    SKIN CANCER EXCISION  In Past    Nose    TONSILLECTOMY  1960's      As reviewed   Family History   Problem Relation Age of Onset    Mental Illness Son    Franco Arthritis Mother     Hearing Loss Mother     Heart Disease Mother         \"Aneurysm In [place]"    Stroke Father     Heart Attack Father     Heart Disease Father         Heart Attack    Diabetes Father     Other Brother         \"Colon Polyps\"    Heart Disease Brother         \"Heart Surgery\"     Social History     Tobacco Use    Smoking status: Never Smoker    Smokeless tobacco: Never Used   Substance Use Topics    Alcohol use: No        Objective:    Vitals:    05/23/22 1306   BP: 100/60   Pulse: 71   Weight: 122 lb 12.8 oz (55.7 kg)   Height: 5' 1.5\" (1.562 m)     /60   Pulse 71   Ht 5' 1.5\" (1.562 m)   Wt 122 lb 12.8 oz (55.7 kg)   BMI 22.83 kg/m²     Patient-Reported Vitals 12/21/2020   Patient-Reported Weight 129lbs   Patient-Reported Height -   Patient-Reported Systolic -   Patient-Reported Diastolic -   Patient-Reported Pulse -        Wt Readings from Last 3 Encounters:   05/23/22 122 lb 12.8 oz (55.7 kg)   05/19/22 122 lb 6.4 oz (55.5 kg)   02/28/22 122 lb (55.3 kg)     Body mass index is 22.83 kg/m². GENERAL - Alert, oriented, pleasant, in no apparent distress. EYES: No jaundice, no conjunctival pallor. SKIN: It is warm & dry. No rashes. No Echhymosis    HEENT - No clinically significant abnormalities seen. Neck - Supple. No jugular venous distention noted. No carotid bruits. Cardiovascular - Normal S1 and S2 without obvious murmur or gallop. Extremities - No cyanosis, clubbing, or significant edema. Pulmonary - No respiratory distress. No wheezes or rales. Abdomen - No masses, tenderness, or organomegaly. Musculoskeletal - No significant edema. No joint deformities. No muscle wasting. Neurologic - Cranial nerves II through XII are grossly intact. There were no gross focal neurologic abnormalities.     Lab Review   Lab Results   Component Value Date    CKTOTAL 49 08/07/2017    CKMB 0.9 05/09/2011    TROPONINT <0.010 07/06/2021     BNP:    Lab Results   Component Value Date    BNP 37 05/09/2011     PT/INR:    Lab Results   Component Value Date    INR 0.96 07/06/2015     Lab Results   Component Value Date    LABA1C 6.0 08/27/2021    LABA1C 6.1 05/07/2021     Lab Results   Component Value Date    WBC 9.3 07/06/2021    HCT 42.7 07/06/2021    MCV 89.7 07/06/2021     07/06/2021     Lab Results   Component Value Date    CHOL 196 08/27/2021    TRIG 174 (H) 08/27/2021    HDL 32 (L) 08/27/2021    LDLCALC see below 02/21/2017    LDLDIRECT 134 (H) 08/27/2021     Lab Results   Component Value Date    ALT 11 08/27/2021    AST 16 08/27/2021     BMP:    Lab Results   Component Value Date     02/05/2022    K 4.5 02/05/2022     02/05/2022    CO2 32 02/05/2022    BUN 26 02/05/2022 CREATININE 1.4 02/05/2022     CMP:   Lab Results   Component Value Date     02/05/2022    K 4.5 02/05/2022     02/05/2022    CO2 32 02/05/2022    BUN 26 02/05/2022    PROT 5.9 08/27/2021    PROT 6.8 10/23/2012     TSH:    Lab Results   Component Value Date    TSHHS 2.370 08/27/2021           Assessment & Plan:    -  Hypertension: Patients blood pressure is normal. Patient is advised about low sodium diet. Present medical regimen will not be changed. On atenolol stable. complaint                  -  LIPID MANAGEMENT:  Importance of lipid levels discussed with patient   and patient was given dietary advice. NCEP- ATP III guidelines reviewed with patient. -   Changes  in medicines made: No     diet                           -Arrhythmias; on dig and BB           Brittney Officer  MD    [facility] [place]    Please note this report has been partially produced using speech recognition software and may contain errors related to that system including errors in grammar, punctuation, and spelling, as well as words and phrases that may be inappropriate. If there are any questions or concerns please feel free to contact the dictating provider for clarification.

## 2022-07-19 RX ORDER — DIGOXIN 125 MCG
125 TABLET ORAL DAILY
Qty: 90 TABLET | Refills: 3 | Status: SHIPPED | OUTPATIENT
Start: 2022-07-19

## 2022-07-19 RX ORDER — ATENOLOL 50 MG/1
25 TABLET ORAL DAILY
Qty: 45 TABLET | Refills: 3 | Status: SHIPPED | OUTPATIENT
Start: 2022-07-19

## 2022-09-20 ENCOUNTER — OFFICE VISIT (OUTPATIENT)
Dept: OBGYN | Age: 77
End: 2022-09-20
Payer: MEDICARE

## 2022-09-20 VITALS
HEIGHT: 61 IN | WEIGHT: 123 LBS | DIASTOLIC BLOOD PRESSURE: 66 MMHG | BODY MASS INDEX: 23.22 KG/M2 | SYSTOLIC BLOOD PRESSURE: 111 MMHG

## 2022-09-20 DIAGNOSIS — N63.42 SUBAREOLAR LUMP OF LEFT BREAST: ICD-10-CM

## 2022-09-20 DIAGNOSIS — N64.4 NIPPLE PAIN: ICD-10-CM

## 2022-09-20 DIAGNOSIS — Z12.31 ENCOUNTER FOR SCREENING MAMMOGRAM FOR MALIGNANT NEOPLASM OF BREAST: ICD-10-CM

## 2022-09-20 DIAGNOSIS — N95.2 ATROPHIC VAGINITIS: ICD-10-CM

## 2022-09-20 DIAGNOSIS — N64.4 MASTALGIA: Primary | ICD-10-CM

## 2022-09-20 PROCEDURE — 99214 OFFICE O/P EST MOD 30 MIN: CPT | Performed by: OBSTETRICS & GYNECOLOGY

## 2022-09-20 PROCEDURE — 1123F ACP DISCUSS/DSCN MKR DOCD: CPT | Performed by: OBSTETRICS & GYNECOLOGY

## 2022-09-20 RX ORDER — FLUCONAZOLE 100 MG/1
100 TABLET ORAL DAILY
Qty: 4 TABLET | Refills: 0 | Status: SHIPPED | OUTPATIENT
Start: 2022-09-20 | End: 2022-09-24

## 2022-09-20 NOTE — PROGRESS NOTES
9/20/22    Svetlana Murphy  1945    Chief Complaint   Patient presents with    Breast Problem     Pt c/o a burning/stinging sensation on nipples. Pt states it has lasted for 1 wk and the last few days it has gotten worse. Pt states around the nipple is tender. Pt states if Doc feels necessary for mammogram, she requests STAT order for today. Hx on mother's side of Breast Cancer. Pt would also like to discuss estradiol medication concerns- .2mg  @ [facility] prescribed by Dr. Geoff Bradley is working. Pt states she would like to go back to that. Currently using . 1mg and c/o itching. Svetlana Murphy is a 76 y.o. female who presents today for evaluation of bilateral nipple pain and itching. Past Medical History:   Diagnosis Date    Abdominal pain     Anemia     Angioedema 4/7/2014    APC (atrial premature contractions) 2/2012    Infrequent APC's-Dr Bravo    Arrhythmia 4/7/2014    Arthritis     Brain aneurysm      referred to [facility] Dr Radha Luna    CKD (chronic kidney disease)     Sees Dr. Tatiana Perez- \"stage 3\"    Depression with anxiety 2014    Dr. Benjy Watson managing xanax and cymbalta    Diabetes mellitus ([facility]     \"pre- diabetic\"\"no medication yet\" per pt on 4/19/2018    Difficulty swallowing     \"Large Pills\"    [address] [facility]\"have fibromyalgia in all 18 trigger points and my hands an feet are swollen all the time- been to [facility] 4 times for this\"    GE reflux 2006    GERD (gastroesophageal reflux disease)     Groin pain     H/O cardiovascular stress test 2-3-12    2-3-12, EF 70 % normal study    H/O echocardiogram 2-3-12    2-3-12 EF 55%, normal test    Hemorrhoids     + colonoscopy 2011 Dr. Katherine Bond    History of 24 hour EKG monitoring 2-7-12    48 hour holter, predonimant rhythm is SInus rhythum, Max  and min HR 64, infrequent PVC's and APC's.     [place] (hard of hearing)     Bilateral Hearing Aids    Hx of cardiovascular stress test 6/12/2015    lexiscan-mild ischemia apical,EF70%    Hx of cardiovascular stress test 01/30/2019    Normal Lexiscan nuclear scintigraphic study suggestive of normal myocardial perfusion. Gated images demonstrate normal left ventricular systolic function with EF of 60 %.     Hyperlipidemia     Hypertension     follows with Dr Shea Aguilera    IBS (irritable bowel syndrome) 7/7/2013    Dr. Cristiana Solitario, IBS diet, immodium prn (2013)     Internal hemorrhoids 2011    Dr. Jose C Funez    Interstitial cystitis     Dr. Bhupendra Forrest with cath    Lower back pain     MVP (mitral valve prolapse)     Nausea     Palpitations     Following with  Dr. Erlinda Whitaker, and on digoxin    Pap smear for cervical cancer screening     Dr. Sandra Garrett     Pulmonary nodule     9/14 CT chest 8mm nodule, no change, recheck 9/15    PVC (premature ventricular contraction) 2/2012    Infrequent PVC's-Dr. Wendy Orozco    Right inguinal pain     referred from Dr. Hiwot Talbot to Dr. Jude Titus then PT ( no hernia)    Shortness of breath on exertion     Skin cancer of nose In Past    Dr. Benny Kennedy,  previously see Dr. Carey Brown    Teeth missing     Upper And Lower    Thigh pain     UTI (urinary tract infection) 03/2018    Vitamin D deficiency     Wears glasses     Wears partial dentures     Upper       Past Surgical History:   Procedure Laterality Date    BLADDER SURGERY  2000, 2008    Dr. Bhupendra Forrest- dilatation Eulice Cluck Dr. Gertrude Saint - [facility]ist in [place];Grade 1 internal hemorrhoids    DENTAL SURGERY      Teeth Extracted In Past    ENDOSCOPY, COLON, DIAGNOSTIC  In Past    EYE SURGERY Left 11/2017    Cataract With Lens Implant    HYSTERECTOMY, TOTAL ABDOMINAL (CERVIX REMOVED)  1/14    \"took everything    SHOULDER ARTHROSCOPY Right 11/2017    rotator cuff repair, SAd, distal clavicle excision, extensive debridement    SKIN CANCER EXCISION  In Past    Nose    TONSILLECTOMY  1960's       Social History     Tobacco Use    Smoking status: Never Smokeless tobacco: Never   Vaping Use    Vaping Use: Never used   Substance Use Topics    Alcohol use: No    Drug use: No       Family History   Problem Relation Age of Onset    Mental Illness Son     Arthritis Mother     Hearing Loss Mother     Heart Disease Mother         Citlaly Samson In [place]"    Stroke Father     Heart Attack Father     Heart Disease Father         Heart Attack    Diabetes Father     Other Brother         \"Colon Polyps\"    Heart Disease Brother         \"Heart Surgery\"       Current Outpatient Medications   Medication Sig Dispense Refill    fluconazole (DIFLUCAN) 100 MG tablet Take 1 tablet by mouth daily for 4 days 4 tablet 0    estradiol (ESTRACE) 0.1 MG/GM vaginal cream       furosemide (LASIX) 40 MG tablet TAKE ONE (1) TABLET BY MOUTH AS DIRECTED ON MON-WED-FRI 45 tablet 3    digoxin (LANOXIN) 125 MCG tablet Take 1 tablet by mouth in the morning. Patient take 1 tab every other day and 1/2 tab on opposite days. 90 tablet 3    atenolol (TENORMIN) 50 MG tablet Take 0.5 tablets by mouth in the morning. Dose decrease as of 4/15/2013. 45 tablet 3    zinc oxide 13 % CREA Apply topically 2 times daily as needed for Rash 454 g 1    DULoxetine (CYMBALTA) 60 MG extended release capsule Take 60 mg by mouth daily      Probiotic Product (PROBIOTIC-10 PO) Take 1 tablet by mouth daily       folic acid (FOLVITE) 1 MG tablet Take 1 mg by mouth daily       aspirin 81 MG EC tablet Take 81 mg by mouth every morning Over The Counter      ALPRAZolam (XANAX) 0.5 MG tablet Take 1.5 mg by mouth 3 times daily. clobetasol (TEMOVATE) 0.05 % cream Apply twice daily 60 g 3    nortriptyline (PAMELOR) 10 MG/5ML solution Take 2 ml by mouth nightly (clarification as of 8/2/2012)- please give 30 day supply with 3 refills (Patient taking differently: Take 5 mg by mouth nightly Take 2 ml by mouth nightly) 1 Bottle 5    gabapentin (NEURONTIN) 250 MG/5ML solution Take 4 mL by mouth daily.  (Patient taking differently: Take 300 mg by mouth nightly. Take 4 mL by mouth daily.) 450 mL 3    Multiple Vitamin (MULTIVITAMIN PO) Take 1 tablet by mouth every morning Over The Counter       No current facility-administered medications for this visit. Allergies   Allergen Reactions    Demerol Rash    Pcn [Penicillins] Rash    Statins      \"Causes Muscle Aches And Pains\"    Sulfa Antibiotics Rash    Vicodin [Hydrocodone-Acetaminophen]      \"Dizziness\"    Zetia [Ezetimibe] Palpitations and Other (See Comments)     Heart palpitations  \"Heart Palpitations\"    Adhesive Tape Other (See Comments) and Rash     Muscles aches    Glycerin      \"tongue swells\"    Percocet [Oxycodone-Acetaminophen] Other (See Comments)     \"hallucination\"    Meperidine Rash    Prednisone Palpitations           Immunization History   Administered Date(s) Administered    COVID-19, PFIZER PURPLE top, DILUTE for use, (age 15 y+), 30mcg/0.3mL 2020, 2021, 10/18/2021    Influenza Vaccine, unspecified formulation 10/16/2018    Influenza, High Dose (Fluzone 65 yrs and older) 2015, 10/03/2016, 10/03/2017, 10/03/2020    Pneumococcal Conjugate 13-valent ([place]) 2015, 10/03/2017    Pneumococcal Polysaccharide ([id number]) 2011       Review of Systems    /66   Ht 5' 1\" (1.549 m)   Wt 123 lb (55.8 kg)   BMI 23.24 kg/m²     Physical Exam  Exam conducted with a chaperone present. Constitutional:       Appearance: Normal appearance. HENT:      Head: Normocephalic. Nose: Nose normal.   Cardiovascular:      Rate and Rhythm: Normal rate. Pulses: Normal pulses. Pulmonary:      Effort: Pulmonary effort is normal.   Chest:      Chest wall: No mass, swelling or tenderness. Breasts:     Right: Normal. No swelling, bleeding, inverted nipple, mass, nipple discharge, skin change, tenderness, axillary adenopathy or supraclavicular adenopathy.       Left: Normal. No swelling, bleeding, inverted nipple, mass, nipple discharge, skin change,

## 2022-09-27 DIAGNOSIS — Z01.419 WOMEN'S ANNUAL ROUTINE GYNECOLOGICAL EXAMINATION: ICD-10-CM

## 2022-10-10 ENCOUNTER — OFFICE VISIT (OUTPATIENT)
Dept: OBGYN | Age: 77
End: 2022-10-10
Payer: MEDICARE

## 2022-10-10 VITALS
BODY MASS INDEX: 23.6 KG/M2 | SYSTOLIC BLOOD PRESSURE: 104 MMHG | DIASTOLIC BLOOD PRESSURE: 51 MMHG | HEIGHT: 61 IN | WEIGHT: 125 LBS

## 2022-10-10 DIAGNOSIS — N64.4 MASTALGIA: Primary | ICD-10-CM

## 2022-10-10 DIAGNOSIS — M81.0 OSTEOPOROSIS, POST-MENOPAUSAL: ICD-10-CM

## 2022-10-10 DIAGNOSIS — N64.4 NIPPLE PAIN: ICD-10-CM

## 2022-10-10 PROCEDURE — 99213 OFFICE O/P EST LOW 20 MIN: CPT | Performed by: OBSTETRICS & GYNECOLOGY

## 2022-10-10 PROCEDURE — 1123F ACP DISCUSS/DSCN MKR DOCD: CPT | Performed by: OBSTETRICS & GYNECOLOGY

## 2022-10-10 RX ORDER — FLUCONAZOLE 100 MG/1
100 TABLET ORAL DAILY
Qty: 7 TABLET | Refills: 0 | Status: SHIPPED | OUTPATIENT
Start: 2022-10-10 | End: 2022-10-17

## 2022-10-10 NOTE — PROGRESS NOTES
10/10/22    Rian Forbes  1945    Chief Complaint   Patient presents with    Breast Pain     Pt here to f/u on the breast pain. Pt had Subareolar lump of left breast and was told to get mammo. Pt had [facility]o [facility] on XXX. Pt c/o a burning/stinging sensation on nipples. Pt states it has lasted for 3 wk and the last few days it has gotten worse. Pt states around the nipple is tender. Fatigue     Pt c/o increase in fatigue x3 weeks. Pt has questions regarding fatigue. Pt is wondering if her fibromyalgia is interfering with the fatigue. Other     Pt states the diflucan had helped but has since ran out. Rian Forbes is a 76 y.o. female who presents today for evaluation of breast pain and nipple pain. Relief  with diflucan but then pain returned when finished the 4 days of diflucan. Reviewed diagnostic mammogram    Reviewed DEXA.         Past Medical History:   Diagnosis Date    Abdominal pain     Anemia     Angioedema 4/7/2014    APC (atrial premature contractions) 2/2012    Infrequent APC's-Dr Bravo    Arrhythmia 4/7/2014    Arthritis     Brain aneurysm      referred to [facility] Dr Jarvis Sierra    CKD (chronic kidney disease)     Sees Dr. Hoda Cho- \"stage 3\"    Depression with anxiety 2014    Dr. Chantal Gold managing xanax and cymbalta    Diabetes mellitus ([facility] [address]     \"pre- diabetic\"\"no medication yet\" per pt on 4/19/2018    Difficulty swallowing     \"Large Pills\"    [address] [place]\"have fibromyalgia in all 18 trigger points and my hands an feet are swollen all the time- been to [facility] 4 times for this\"    GE reflux 2006    GERD (gastroesophageal reflux disease)     Groin pain     H/O cardiovascular stress test 2-3-12    2-3-12, EF 70 % normal study    H/O echocardiogram 2-3-12    2-3-12 EF 55%, normal test    Hemorrhoids     + colonoscopy 2011 Dr. Mike Perez    History of 24 hour EKG monitoring 2-7-12    48 hour holter, predonimant rhythm is SInus rhythum, Max  and min HR 64, infrequent PVC's and APC's. [place] (hard of hearing)     Bilateral Hearing Aids    Hx of cardiovascular stress test 2015    lexiscan-mild ischemia apical,EF70%    Hx of cardiovascular stress test 2019    Normal Lexiscan nuclear scintigraphic study suggestive of normal myocardial perfusion. Gated images demonstrate normal left ventricular systolic function with EF of 60 %.     Hyperlipidemia     Hypertension     follows with Dr Pam Ramirez    IBS (irritable bowel syndrome) 2013    Dr. Luz Maria Trevizo, IBS diet, immodium prn ()     Internal hemorrhoids     Dr. Nixon Verde    Interstitial cystitis     Dr. Astrid Silverw with cath    Lower back pain     MVP (mitral valve prolapse)     Nausea     Palpitations     Following with  Dr. Yumiko Burger, and on digoxin    Pap smear for cervical cancer screening     Dr. Abrahan Espinoza     Pulmonary nodule      CT chest 8mm nodule, no change, recheck 9/15    PVC (premature ventricular contraction) 2012    Infrequent PVC's-Dr. Leobardo Warren    Right inguinal pain     referred from Dr. Navi Maya to Dr. Bowers Iaeger then PT ( no hernia)    Shortness of breath on exertion     Skin cancer of nose In Past    Dr. Jeanine Espinosa,  previously see Dr. Gianna Mccalulm    Teeth missing     Upper And Lower    Thigh pain     UTI (urinary tract infection) 2018    Vitamin D deficiency     Wears glasses     Wears partial dentures     Upper       Past Surgical History:   Procedure Laterality Date    BLADDER SURGERY  ,     Dr. Astrid Mckenzie- dilatation     [place]     Dr. Nitza Mathew - [facility] Specialist in [place];Grade 1 internal hemorrhoids    DENTAL SURGERY      Teeth Extracted In Past    ENDOSCOPY, COLON, DIAGNOSTIC  In Past    EYE SURGERY Left 2017    Cataract With Lens Implant    HYSTERECTOMY, TOTAL ABDOMINAL (CERVIX REMOVED)      \"took everything    SHOULDER ARTHROSCOPY Right 2017    rotator cuff repair, SAd, distal clavicle excision, extensive debridement    SKIN CANCER EXCISION  In Past    Nose    TONSILLECTOMY  1960's       Social History     Tobacco Use    Smoking status: Never    Smokeless tobacco: Never   Vaping Use    Vaping Use: Never used   Substance Use Topics    Alcohol use: No    Drug use: No       Family History   Problem Relation Age of Onset    Mental Illness Son     Arthritis Mother     Hearing Loss Mother     Heart Disease Mother         \"Aneurysm In [place]"    Stroke Father     Heart Attack Father     Heart Disease Father         Heart Attack    Diabetes Father     Other Brother         \"Colon Polyps\"    Heart Disease Brother         \"Heart Surgery\"       Current Outpatient Medications   Medication Sig Dispense Refill    vitamin D (CHOLECALCIFEROL) 125 MCG (5000 UT) CAPS capsule Take 5,000 Units by mouth daily      fluconazole (DIFLUCAN) 100 MG tablet Take 1 tablet by mouth daily for 7 days 7 tablet 0    estradiol (ESTRACE) 0.1 MG/GM vaginal cream       furosemide (LASIX) 40 MG tablet TAKE ONE (1) TABLET BY MOUTH AS DIRECTED ON MON-WED-FRI 45 tablet 3    digoxin (LANOXIN) 125 MCG tablet Take 1 tablet by mouth in the morning. Patient take 1 tab every other day and 1/2 tab on opposite days. 90 tablet 3    atenolol (TENORMIN) 50 MG tablet Take 0.5 tablets by mouth in the morning. Dose decrease as of 4/15/2013. 45 tablet 3    zinc oxide 13 % CREA Apply topically 2 times daily as needed for Rash 454 g 1    DULoxetine (CYMBALTA) 60 MG extended release capsule Take 60 mg by mouth daily      Probiotic Product (PROBIOTIC-10 PO) Take 1 tablet by mouth daily       folic acid (FOLVITE) 1 MG tablet Take 1 mg by mouth daily       aspirin 81 MG EC tablet Take 81 mg by mouth every morning Over The Counter      ALPRAZolam (XANAX) 0.5 MG tablet Take 1.5 mg by mouth 3 times daily.        clobetasol (TEMOVATE) 0.05 % cream Apply twice daily 60 g 3    nortriptyline (PAMELOR) 10 MG/5ML solution Take 2 ml by mouth nightly (clarification as of 2012)- please give 30 day supply with 3 refills (Patient taking differently: Take 5 mg by mouth nightly Take 2 ml by mouth nightly) 1 Bottle 5    gabapentin (NEURONTIN) 250 MG/5ML solution Take 4 mL by mouth daily. (Patient taking differently: Take 300 mg by mouth nightly. Take 4 mL by mouth daily.) 450 mL 3    Multiple Vitamin (MULTIVITAMIN PO) Take 1 tablet by mouth every morning Over The Counter       No current facility-administered medications for this visit. Allergies   Allergen Reactions    Demerol Rash    Pcn [Penicillins] Rash    Statins      \"Causes Muscle Aches And Pains\"    Sulfa Antibiotics Rash    Vicodin [Hydrocodone-Acetaminophen]      \"Dizziness\"    Zetia [Ezetimibe] Palpitations and Other (See Comments)     Heart palpitations  \"Heart Palpitations\"    Adhesive Tape Other (See Comments) and Rash     Muscles aches    Glycerin      \"tongue swells\"    Percocet [Oxycodone-Acetaminophen] Other (See Comments)     \"hallucination\"    Meperidine Rash    Prednisone Palpitations           Immunization History   Administered Date(s) Administered    COVID-19, PFIZER PURPLE top, DILUTE for use, (age 15 y+), 30mcg/0.3mL 2020, 2021, 10/18/2021    Influenza Vaccine, unspecified formulation 10/16/2018    Influenza, High Dose (Fluzone 65 yrs and older) 2015, 10/03/2016, 10/03/2017, 10/03/2020    Pneumococcal Conjugate 13-valent ([id number]) 2015, 10/03/2017    Pneumococcal Polysaccharide ([id number]) 2011       Review of Systems    BP (!) 104/51   Ht 5' 1\" (1.549 m)   Wt 125 lb (56.7 kg)   BMI 23.62 kg/m²     Physical Exam  Exam conducted with a chaperone present. Constitutional:       Appearance: Normal appearance. HENT:      Head: Normocephalic. Nose: Nose normal.   Cardiovascular:      Rate and Rhythm: Normal rate. Pulses: Normal pulses.    Pulmonary:      Effort: Pulmonary effort is normal.   Chest:      Chest wall: No mass, swelling or tenderness. Breasts:     Right: Normal. No swelling, bleeding, inverted nipple, mass, nipple discharge, skin change or tenderness. Left: Normal. No swelling, bleeding, inverted nipple, mass, nipple discharge, skin change or tenderness. Abdominal:      General: Abdomen is flat. Bowel sounds are normal. There is no distension. Palpations: Abdomen is soft. There is no mass. Tenderness: There is no abdominal tenderness. Hernia: No hernia is present. Musculoskeletal:      Cervical back: Normal range of motion. Lymphadenopathy:      Upper Body:      Right upper body: No supraclavicular, axillary or pectoral adenopathy. Left upper body: No supraclavicular, axillary or pectoral adenopathy. Skin:     General: Skin is warm and dry. Neurological:      General: No focal deficit present. Mental Status: She is alert. Psychiatric:         Mood and Affect: Mood normal.         Behavior: Behavior normal.       No results found for this visit on 10/10/22. ASSESSMENT AND PLAN   Diagnosis Orders   1. Mastalgia  fluconazole (DIFLUCAN) 100 MG tablet      2. Nipple pain  fluconazole (DIFLUCAN) 100 MG tablet      3. Osteoporosis, post-menopausal          Consider evening primrose oil    As complete relief with 4 days of diflucan and then pain returns. Will repeat 7 days of diflucan 100mg in case yeas tin breast ducts    Return if symptoms worsen or fail to improve.     Royal Andrade MD

## 2022-10-11 ENCOUNTER — TELEPHONE (OUTPATIENT)
Dept: OBGYN | Age: 77
End: 2022-10-11

## 2022-10-11 NOTE — TELEPHONE ENCOUNTER
Pt called stating she saw you yesterday 10/10/22. Pt states she forgot to mention has had some vaginal itching since May. Pt states when the itching gets bad she uses a \"q-tip and bagbalm\" to help relieve the itching. Pt states it all began when an estradiol cream was called in. Pt states she normally gets her estradiol cream from the compounding lab at [facility]. Pt states this cream was different. Pt would like to know what you recommend?

## 2022-10-11 NOTE — TELEPHONE ENCOUNTER
Is patient using clobetasol on her vulva which is on her medication list?  If she is using the clobetasol on vulvar, I would recommend office visit.   If she is not using the clobetasol, I will send in triamcinolone ointment and then if not resolved have her come in for an office visit

## 2022-10-21 DIAGNOSIS — M81.0 AGE-RELATED OSTEOPOROSIS WITHOUT CURRENT PATHOLOGICAL FRACTURE: ICD-10-CM

## 2022-10-27 ENCOUNTER — OFFICE VISIT (OUTPATIENT)
Dept: OBGYN | Age: 77
End: 2022-10-27
Payer: MEDICARE

## 2022-10-27 VITALS
SYSTOLIC BLOOD PRESSURE: 114 MMHG | BODY MASS INDEX: 23.79 KG/M2 | HEIGHT: 61 IN | DIASTOLIC BLOOD PRESSURE: 70 MMHG | WEIGHT: 126 LBS

## 2022-10-27 DIAGNOSIS — L29.2 VULVAR ITCHING: Primary | ICD-10-CM

## 2022-10-27 DIAGNOSIS — N76.2 ATROPHIC VULVITIS: ICD-10-CM

## 2022-10-27 DIAGNOSIS — N95.2 ATROPHIC VAGINITIS: ICD-10-CM

## 2022-10-27 PROCEDURE — 1123F ACP DISCUSS/DSCN MKR DOCD: CPT | Performed by: OBSTETRICS & GYNECOLOGY

## 2022-10-27 PROCEDURE — 99213 OFFICE O/P EST LOW 20 MIN: CPT | Performed by: OBSTETRICS & GYNECOLOGY

## 2022-10-27 PROCEDURE — 3074F SYST BP LT 130 MM HG: CPT | Performed by: OBSTETRICS & GYNECOLOGY

## 2022-10-27 PROCEDURE — 3078F DIAST BP <80 MM HG: CPT | Performed by: OBSTETRICS & GYNECOLOGY

## 2022-10-27 RX ORDER — CLOBETASOL PROPIONATE 0.5 MG/G
OINTMENT TOPICAL
Qty: 30 G | Refills: 0 | Status: SHIPPED | OUTPATIENT
Start: 2022-10-27

## 2022-10-27 NOTE — Clinical Note
Please call [facility] pharmacist at [address] [facility] and obtain prescription (vaginal hormonal cream) from Dr. Anderson Dueñas.

## 2022-10-27 NOTE — PROGRESS NOTES
10/27/22    Nydia Gomez  1945    Chief Complaint   Patient presents with    Vaginal Itching     Pt states when the itching gets bad she uses a \"q-tip and bagbalm\" to help relieve the itching. Pt states it all began when an estradiol cream was called in. Pt states she normally gets her estradiol cream from the compounding lab at [facility]. Pt states this cream was different. Pt has questions regarding which cream will help relieve the vaginal itching. Breast Pain     Pt was seen on 10/10 due to  burning/stinging sensation on nipple. Pt states it has lasted for 3 wk pt states the diflucan has helped but pt states the pain is still there and has questions regarding. Nydia Gomez is a 76 y.o. female who presents today for evaluation of vaginal itching. Vaginal discomfort began with compounded medication from [facility] for interstitial cystitis from Dr. Marlon Palacio urology. She examines herself when the itching is severe and vulva is red.     Past Medical History:   Diagnosis Date    Abdominal pain     Anemia     Angioedema 4/7/2014    APC (atrial premature contractions) 2/2012    Infrequent APC's-Dr Bravo    Arrhythmia 4/7/2014    Arthritis     Brain aneurysm      referred to [facility] Dr Gunjan Rondon    CKD (chronic kidney disease)     Sees Dr. Ester Kelly- \"stage 3\"    Depression with anxiety 2014    Dr. Jorge Meraz managing xanax and cymbalta    Diabetes mellitus ([facility] [address]     \"pre- diabetic\"\"no medication yet\" per pt on 4/19/2018    Difficulty swallowing     \"Large Pills\"    [address] [facility]\"have fibromyalgia in all 18 trigger points and my hands an feet are swollen all the time- been to [facility] 4 times for this\"    GE reflux 2006    GERD (gastroesophageal reflux disease)     Groin pain     H/O cardiovascular stress test 2-3-12    2-3-12, EF 70 % normal study    H/O echocardiogram 2-3-12    2-3-12 EF 55%, normal test    Hemorrhoids     + colonoscopy 2011 Dr. Rafael Zapata    History of 24 hour EKG monitoring 2-7-12    48 hour holter, predonimant rhythm is SInus rhythum, Max  and min HR 64, infrequent PVC's and APC's. [place] (hard of hearing)     Bilateral Hearing Aids    Hx of cardiovascular stress test 6/12/2015    lexiscan-mild ischemia apical,EF70%    Hx of cardiovascular stress test 01/30/2019    Normal Lexiscan nuclear scintigraphic study suggestive of normal myocardial perfusion. Gated images demonstrate normal left ventricular systolic function with EF of 60 %.     Hyperlipidemia     Hypertension     follows with Dr Caffie Cockayne    IBS (irritable bowel syndrome) 7/7/2013    Dr. Ryan Victoria, IBS diet, immodium prn (2013)     Internal hemorrhoids 2011    Dr. Mayberry Simpler    Interstitial cystitis     Dr. Andrew Dolan with cath    Lower back pain     MVP (mitral valve prolapse)     Nausea     Osteoporosis 10/21/2022    Palpitations     Following with  Dr. Rhonda Baker, and on digoxin    Pap smear for cervical cancer screening     Dr. Salvador Wu     Pulmonary nodule     9/14 CT chest 8mm nodule, no change, recheck 9/15    PVC (premature ventricular contraction) 2/2012    Infrequent PVC's-Dr. Araujo Later    Right inguinal pain     referred from Dr. Izaiah Maloney to Dr. Marietta Bear then PT ( no hernia)    Shortness of breath on exertion     Skin cancer of nose In Past    Dr. Ramos Second,  previously see Dr. Raymond Mccloud    Teeth missing     Upper And Lower    Thigh pain     UTI (urinary tract infection) 03/2018    Vitamin D deficiency     Wears glasses     Wears partial dentures     Upper       Past Surgical History:   Procedure Laterality Date    BLADDER SURGERY  2000, 2008    Dr. Andrew Dolan- dilatation     Catheline Dock    Dr. Rafael Zapata - Digestive Specialist in [place];Grade 1 internal hemorrhoids    DENTAL SURGERY      Teeth Extracted In Past    ENDOSCOPY, COLON, DIAGNOSTIC  In Past    EYE SURGERY Left 11/2017    Cataract With Lens Implant    HYSTERECTOMY, TOTAL ABDOMINAL (CERVIX REMOVED)  1/14    \"took everything    SHOULDER ARTHROSCOPY Right 11/2017    rotator cuff repair, SAd, distal clavicle excision, extensive debridement    SKIN CANCER EXCISION  In Past    Nose    TONSILLECTOMY  1960's       Social History     Tobacco Use    Smoking status: Never    Smokeless tobacco: Never   Vaping Use    Vaping Use: Never used   Substance Use Topics    Alcohol use: No    Drug use: No       Family History   Problem Relation Age of Onset    Mental Illness Son     Arthritis Mother     Hearing Loss Mother     Heart Disease Mother         \"Aneurysm In [place]"    Stroke Father     Heart Attack Father     Heart Disease Father         Heart Attack    Diabetes Father     Other Brother         \"Colon Polyps\"    Heart Disease Brother         \"Heart Surgery\"       Current Outpatient Medications   Medication Sig Dispense Refill    Cholecalciferol (VITAMIN D3) 125 MCG (5000 UT) TABS Take by mouth      clobetasol (TEMOVATE) 0.05 % ointment Apply topically nightly for two weeks (do not use the days using vaginal estrogen) 30 g 0    vitamin D (CHOLECALCIFEROL) 125 MCG (5000 UT) CAPS capsule Take 5,000 Units by mouth daily      estradiol (ESTRACE) 0.1 MG/GM vaginal cream       furosemide (LASIX) 40 MG tablet TAKE ONE (1) TABLET BY MOUTH AS DIRECTED ON MON-WED-FRI 45 tablet 3    digoxin (LANOXIN) 125 MCG tablet Take 1 tablet by mouth in the morning. Patient take 1 tab every other day and 1/2 tab on opposite days. 90 tablet 3    atenolol (TENORMIN) 50 MG tablet Take 0.5 tablets by mouth in the morning.  Dose decrease as of 4/15/2013. 45 tablet 3    zinc oxide 13 % CREA Apply topically 2 times daily as needed for Rash 454 g 1    DULoxetine (CYMBALTA) 60 MG extended release capsule Take 60 mg by mouth daily      Probiotic Product (PROBIOTIC-10 PO) Take 1 tablet by mouth daily       folic acid (FOLVITE) 1 MG tablet Take 1 mg by mouth daily       aspirin 81 MG EC tablet Take 81 mg by mouth every morning Over The Counter      ALPRAZolam (XANAX) 0.5 MG tablet Take 1.5 mg by mouth 3 times daily. clobetasol (TEMOVATE) 0.05 % cream Apply twice daily 60 g 3    nortriptyline (PAMELOR) 10 MG/5ML solution Take 2 ml by mouth nightly (clarification as of 2012)- please give 30 day supply with 3 refills (Patient taking differently: Take 5 mg by mouth nightly Take 2 ml by mouth nightly) 1 Bottle 5    gabapentin (NEURONTIN) 250 MG/5ML solution Take 4 mL by mouth daily. (Patient taking differently: Take 300 mg by mouth nightly. Take 4 mL by mouth daily.) 450 mL 3    Multiple Vitamin (MULTIVITAMIN PO) Take 1 tablet by mouth every morning Over The Counter       No current facility-administered medications for this visit. Allergies   Allergen Reactions    Demerol Rash    Pcn [Penicillins] Rash    Statins      \"Causes Muscle Aches And Pains\"    Sulfa Antibiotics Rash    Vicodin [Hydrocodone-Acetaminophen]      \"Dizziness\"    Zetia [Ezetimibe] Palpitations and Other (See Comments)     Heart palpitations  \"Heart Palpitations\"    Adhesive Tape Other (See Comments) and Rash     Muscles aches    Glycerin      \"tongue swells\"    Percocet [Oxycodone-Acetaminophen] Other (See Comments)     \"hallucination\"    Meperidine Rash    Prednisone Palpitations           Immunization History   Administered Date(s) Administered    COVID-19, PFIZER PURPLE top, DILUTE for use, (age 15 y+), 30mcg/0.3mL 2020, 2021, 10/18/2021    Influenza Vaccine, unspecified formulation 10/16/2018    Influenza, High Dose (Fluzone 65 yrs and older) 2015, 10/03/2016, 10/03/2017, 10/03/2020    Pneumococcal Conjugate 13-valent ([id number]) 2015, 10/03/2017    Pneumococcal Polysaccharide ([id number]) 2011       Review of Systems    /70   Ht 5' 1\" (1.549 m)   Wt 126 lb (57.2 kg)   BMI 23.81 kg/m²     Physical Exam  Exam conducted with a chaperone present. Constitutional:       Appearance: Normal appearance.    HENT:      Head: Normocephalic and atraumatic. Nose: Nose normal.   Cardiovascular:      Rate and Rhythm: Normal rate. Pulses: Normal pulses. Pulmonary:      Effort: Pulmonary effort is normal.   Abdominal:      General: Abdomen is flat. There is no distension. Palpations: Abdomen is soft. There is no mass. Tenderness: There is no abdominal tenderness. Hernia: No hernia is present. There is no hernia in the left inguinal area or right inguinal area. Genitourinary:     Exam position: Lithotomy position. Pubic Area: No rash. Labia:         Right: No rash, tenderness or lesion. Left: No rash, tenderness or lesion. Urethra: No prolapse, urethral pain, urethral swelling or urethral lesion. Vagina: Normal. No vaginal discharge, erythema, tenderness, bleeding or lesions. Uterus: Absent. Adnexa: Right adnexa normal and left adnexa normal.        Right: No mass, tenderness or fullness. Left: No mass, tenderness or fullness. Rectum: No mass or anal fissure. Normal anal tone. Comments: Atrophy vulva and vagina   Musculoskeletal:      Cervical back: Normal range of motion and neck supple. Lymphadenopathy:      Lower Body: No right inguinal adenopathy. No left inguinal adenopathy. Skin:     General: Skin is warm and dry. Neurological:      General: No focal deficit present. Mental Status: She is alert and oriented to person, place, and time. Psychiatric:         Mood and Affect: Mood normal.         Behavior: Behavior normal.         Thought Content: Thought content normal.         Judgment: Judgment normal.       No results found for this visit on 10/27/22.  05/19/2022 1652 05/20/2022 1431 Vaginal Pathogens Probes *A [[id number]]    Vaginal    Component Value   Trichomonas Vaginalis DNA Negative DNA not detected. Normal range: Negative DNA not detected. Gardnerella Vaginalis, DNA Probe Negative DNA not detected.    Normal range: Negative DNA not detected. Candida Species, DNA Probe Negative DNA not detected. Normal range: Negative DNA not detected. 02/28/2022 1151 03/01/2022 1026 Vaginal Pathogens Probes *A [[id number]]    Vaginal    Component Value   Trichomonas Vaginalis DNA Negative DNA not detected. Normal range: Negative DNA not detected. Gardnerella Vaginalis, DNA Probe Negative DNA not detected. Normal range: Negative DNA not detected. Hui Species, DNA Probe Negative DNA not detected. Normal range: Negative DNA not detected. 02/28/2022 1122 03/01/2022 1919 C.trachomatis N.gonorrhoeae DNA, Urine [[id number]]    Urine    Component Value   C. trachomatis DNA ,Urine Negative   N. gonorrhoeae DNA, Urine Negative          02/28/2022 1122 03/01/2022 2332 Culture, Urine [[id number]]    Urine, clean catch    Component Value   Urine Culture, Routine No growth at 18 to 36 hours              See mammogram 9/2022 (bilateral diagnostic)    ASSESSMENT AND PLAN   Diagnosis Orders   1. Vulvar itching  clobetasol (TEMOVATE) 0.05 % ointment      2. Atrophic vulvitis        3. Atrophic vaginitis            Return in about 4 weeks (around 11/24/2022).   Obtain rx from [facility] urology    Dami Hairston MD

## 2022-10-31 NOTE — PROGRESS NOTES
Spoke w/ Camron Prather. Camron Prather states her prescription is 0.2 mg per gram and she is using 1 gram, 2x weekly.

## 2022-11-28 ENCOUNTER — TELEPHONE (OUTPATIENT)
Dept: OBGYN | Age: 77
End: 2022-11-28

## 2022-11-28 NOTE — TELEPHONE ENCOUNTER
Pt states she has been taken the steroid cream clobtasol x1 month instead of just two weeks. So now pt is wanting to schedule her appt for 12/14/22 to make sure her results are accurate. What do you recommend?

## 2022-12-19 ENCOUNTER — OFFICE VISIT (OUTPATIENT)
Dept: OBGYN | Age: 77
End: 2022-12-19
Payer: MEDICARE

## 2022-12-19 VITALS
SYSTOLIC BLOOD PRESSURE: 138 MMHG | WEIGHT: 127 LBS | BODY MASS INDEX: 23.98 KG/M2 | DIASTOLIC BLOOD PRESSURE: 63 MMHG | HEIGHT: 61 IN

## 2022-12-19 DIAGNOSIS — Z12.31 ENCOUNTER FOR SCREENING MAMMOGRAM FOR MALIGNANT NEOPLASM OF BREAST: ICD-10-CM

## 2022-12-19 DIAGNOSIS — N64.4 MASTALGIA: Primary | ICD-10-CM

## 2022-12-19 DIAGNOSIS — L29.2 VULVAR ITCHING: ICD-10-CM

## 2022-12-19 PROCEDURE — 3074F SYST BP LT 130 MM HG: CPT | Performed by: OBSTETRICS & GYNECOLOGY

## 2022-12-19 PROCEDURE — 1123F ACP DISCUSS/DSCN MKR DOCD: CPT | Performed by: OBSTETRICS & GYNECOLOGY

## 2022-12-19 PROCEDURE — 99213 OFFICE O/P EST LOW 20 MIN: CPT | Performed by: OBSTETRICS & GYNECOLOGY

## 2022-12-19 PROCEDURE — 3078F DIAST BP <80 MM HG: CPT | Performed by: OBSTETRICS & GYNECOLOGY

## 2022-12-19 ASSESSMENT — ENCOUNTER SYMPTOMS
GASTROINTESTINAL NEGATIVE: 1
EYES NEGATIVE: 1
ALLERGIC/IMMUNOLOGIC NEGATIVE: 1
RESPIRATORY NEGATIVE: 1

## 2022-12-19 NOTE — PROGRESS NOTES
12/19/22    Home Fontaine  1945    Chief Complaint   Patient presents with    Follow-up     Pt stopped steroid cream and when she did itching has returned and area is red and crusty. Breast Problem     Pt c/o left outer upper breast pain that feels like a \"buzz\" electric pain that comes and goes. Home Fontaine is a 76 y.o. female who presents today for evaluation of vulvar irritation and nipple irritation/breast pain    Past Medical History:   Diagnosis Date    Abdominal pain     Anemia     Angioedema 4/7/2014    APC (atrial premature contractions) 2/2012    Infrequent APC's-Dr Bravo    Arrhythmia 4/7/2014    Arthritis     Brain aneurysm      referred to [facility] Dr Oh Mak    CKD (chronic kidney disease)     Sees Dr. Kassie Butts- \"stage 3\"    Depression with anxiety 2014    Dr. Vikki Baumann managing xanax and cymbalta    Diabetes mellitus ([facility] [address]     \"pre- diabetic\"\"no medication yet\" per pt on 4/19/2018    Difficulty swallowing     \"Large Pills\"    [address] [facility]\"have fibromyalgia in all 18 trigger points and my hands an feet are swollen all the time- been to [facility] 4 times for this\"    GE reflux 2006    GERD (gastroesophageal reflux disease)     Groin pain     H/O cardiovascular stress test 2-3-12    2-3-12, EF 70 % normal study    H/O echocardiogram 2-3-12    2-3-12 EF 55%, normal test    Hemorrhoids     + colonoscopy 2011 Dr. Michelle Lopez    History of 24 hour EKG monitoring 2-7-12    48 hour holter, predonimant rhythm is SInus rhythum, Max  and min HR 64, infrequent PVC's and APC's. [place] (hard of hearing)     Bilateral Hearing Aids    Hx of cardiovascular stress test 6/12/2015    lexiscan-mild ischemia apical,EF70%    Hx of cardiovascular stress test 01/30/2019    Normal Lexiscan nuclear scintigraphic study suggestive of normal myocardial perfusion. Gated images demonstrate normal left ventricular systolic function with EF of 60 %. Hyperlipidemia     Hypertension     follows with Dr Ashlyn Rivas    IBS (irritable bowel syndrome) 7/7/2013    Dr. Kristi Koenig, IBS diet, immodium prn (2013)     Internal hemorrhoids 2011    Dr. Lainey Ruelas    Interstitial cystitis     Dr. Cherylene Grand with cath    Lower back pain     MVP (mitral valve prolapse)     Nausea     Osteoporosis 10/21/2022    Palpitations     Following with  Dr. Nolan Puga, and on digoxin    Pap smear for cervical cancer screening     Dr. Krista Bruno     Pulmonary nodule     9/14 CT chest 8mm nodule, no change, recheck 9/15    PVC (premature ventricular contraction) 2/2012    Infrequent PVC's-Dr. Beatrice Sanchez    Right inguinal pain     referred from Dr. Jordyn Crowder to Dr. hCad Castle then PT ( no hernia)    Shortness of breath on exertion     Skin cancer of nose In Past    Dr. Marissa Jacobo,  previously see Dr. Salas Owens    Teeth missing     Upper And Lower    Thigh pain     UTI (urinary tract infection) 03/2018    Vitamin D deficiency     Wears glasses     Wears partial dentures     Upper       Past Surgical History:   Procedure Laterality Date    BLADDER SURGERY  2000, 2008    Dr. Cherylene Grand- dilatation     [facility] in [place];Grade 1 internal hemorrhoids    DENTAL SURGERY      Teeth Extracted In Past    ENDOSCOPY, COLON, DIAGNOSTIC  In Past    EYE SURGERY Left 11/2017    Cataract With Lens Implant    HYSTERECTOMY, TOTAL ABDOMINAL (CERVIX REMOVED)  1/14    \"took everything    SHOULDER ARTHROSCOPY Right 11/2017    rotator cuff repair, SAd, distal clavicle excision, extensive debridement    SKIN CANCER EXCISION  In Past    Nose    TONSILLECTOMY  1960's       Social History     Tobacco Use    Smoking status: Never    Smokeless tobacco: Never   Vaping Use    Vaping Use: Never used   Substance Use Topics    Alcohol use: No    Drug use: No       Family History   Problem Relation Age of Onset    Mental Illness Son     Arthritis Mother     Hearing Loss Mother Heart Disease Mother         \"Aneurysm In Heart\"    Stroke Father     Heart Attack Father     Heart Disease Father         Heart Attack    Diabetes Father     Other Brother         \"Colon Polyps\"    Heart Disease Brother         \"Heart Surgery\"       Current Outpatient Medications   Medication Sig Dispense Refill    furosemide (LASIX) 40 MG tablet TAKE ONE (1) TABLET BY MOUTH AS DIRECTED ON MON-WED-FRI 45 tablet 3    Cholecalciferol (VITAMIN D3) 125 MCG (5000 UT) TABS Take by mouth      clobetasol (TEMOVATE) 0.05 % ointment Apply topically nightly for two weeks (do not use the days using vaginal estrogen) 30 g 0    vitamin D (CHOLECALCIFEROL) 125 MCG (5000 UT) CAPS capsule Take 5,000 Units by mouth daily      estradiol (ESTRACE) 0.1 MG/GM vaginal cream       digoxin (LANOXIN) 125 MCG tablet Take 1 tablet by mouth in the morning. Patient take 1 tab every other day and 1/2 tab on opposite days. 90 tablet 3    atenolol (TENORMIN) 50 MG tablet Take 0.5 tablets by mouth in the morning. Dose decrease as of 4/15/2013. 45 tablet 3    zinc oxide 13 % CREA Apply topically 2 times daily as needed for Rash 454 g 1    DULoxetine (CYMBALTA) 60 MG extended release capsule Take 60 mg by mouth daily      Probiotic Product (PROBIOTIC-10 PO) Take 1 tablet by mouth daily       folic acid (FOLVITE) 1 MG tablet Take 1 mg by mouth daily       aspirin 81 MG EC tablet Take 81 mg by mouth every morning Over The Counter      ALPRAZolam (XANAX) 0.5 MG tablet Take 1.5 mg by mouth 3 times daily. clobetasol (TEMOVATE) 0.05 % cream Apply twice daily 60 g 3    nortriptyline (PAMELOR) 10 MG/5ML solution Take 2 ml by mouth nightly (clarification as of 8/2/2012)- please give 30 day supply with 3 refills (Patient taking differently: Take 5 mg by mouth nightly Take 2 ml by mouth nightly) 1 Bottle 5    gabapentin (NEURONTIN) 250 MG/5ML solution Take 4 mL by mouth daily. (Patient taking differently: Take 300 mg by mouth nightly.  Take 4 mL by mouth daily.) 450 mL 3    Multiple Vitamin (MULTIVITAMIN PO) Take 1 tablet by mouth every morning Over The Counter       No current facility-administered medications for this visit. Allergies   Allergen Reactions    Demerol Rash    Pcn [Penicillins] Rash    Statins      \"Causes Muscle Aches And Pains\"    Sulfa Antibiotics Rash    Vicodin [Hydrocodone-Acetaminophen]      \"Dizziness\"    Zetia [Ezetimibe] Palpitations and Other (See Comments)     Heart palpitations  \"Heart Palpitations\"    Adhesive Tape Other (See Comments) and Rash     Muscles aches    Glycerin      \"tongue swells\"    Percocet [Oxycodone-Acetaminophen] Other (See Comments)     \"hallucination\"    Meperidine Rash    Prednisone Palpitations           Immunization History   Administered Date(s) Administered    COVID-19, PFIZER PURPLE top, DILUTE for use, (age 15 y+), 30mcg/0.3mL 2020, 2021, 10/18/2021    Influenza Vaccine, unspecified formulation 10/16/2018    Influenza, High Dose (Fluzone 65 yrs and older) 2015, 10/03/2016, 10/03/2017, 10/03/2020    Pneumococcal Conjugate 13-valent ([id number]) 2015, 10/03/2017    Pneumococcal Polysaccharide ([id number]) 2011       Review of Systems   Constitutional: Negative. Eyes: Negative. Respiratory: Negative. Cardiovascular: Negative. Gastrointestinal: Negative. Endocrine: Negative. Genitourinary:         Vulvar irritation   Musculoskeletal: Negative. Skin: Negative. Allergic/Immunologic: Negative. Neurological: Negative. Hematological: Negative. Psychiatric/Behavioral: Negative. /63 (Site: Left Upper Arm, Position: Sitting, Cuff Size: Large Adult)   Ht 5' 1\" (1.549 m)   Wt 127 lb (57.6 kg)   BMI 24.00 kg/m²     Physical Exam  Exam conducted with a chaperone present. Constitutional:       Appearance: Normal appearance. HENT:      Head: Normocephalic and atraumatic.       Nose: Nose normal.   Cardiovascular:      Rate and Rhythm: Normal rate. Pulses: Normal pulses. Pulmonary:      Effort: Pulmonary effort is normal.   Chest:      Chest wall: No mass, lacerations, deformity, swelling or tenderness. Breasts:     Right: Normal. No swelling, bleeding, inverted nipple, mass, nipple discharge, skin change or tenderness. Left: Normal. No swelling, bleeding, inverted nipple, mass, nipple discharge, skin change or tenderness. Abdominal:      General: Abdomen is flat. There is no distension. Palpations: Abdomen is soft. There is no mass. Tenderness: There is no abdominal tenderness. Hernia: No hernia is present. There is no hernia in the left inguinal area or right inguinal area. Genitourinary:     Exam position: Lithotomy position. Pubic Area: No rash. Labia:         Right: No rash, tenderness or lesion. Left: No rash, tenderness or lesion. Urethra: No prolapse, urethral pain, urethral swelling or urethral lesion. Vagina: Normal. No vaginal discharge, erythema, tenderness, bleeding or lesions. Uterus: Absent. Adnexa: Right adnexa normal and left adnexa normal.        Right: No mass, tenderness or fullness. Left: No mass, tenderness or fullness. Rectum: No mass or anal fissure. Normal anal tone. Musculoskeletal:      Cervical back: Normal range of motion and neck supple. Lymphadenopathy:      Upper Body:      Right upper body: No supraclavicular, axillary or pectoral adenopathy. Left upper body: No supraclavicular, axillary or pectoral adenopathy. Lower Body: No right inguinal adenopathy. No left inguinal adenopathy. Skin:     General: Skin is warm and dry. Neurological:      General: No focal deficit present. Mental Status: She is alert and oriented to person, place, and time. Psychiatric:         Mood and Affect: Mood normal.         Behavior: Behavior normal.         Thought Content:  Thought content normal.         Judgment: Judgment normal.       No results found for this visit on 12/19/22. ASSESSMENT AND PLAN   Diagnosis Orders   1. Mastalgia  ARLENE KATLYN DIGITAL DIAGNOSTIC BILATERAL PER PROTOCOL      2. Encounter for screening mammogram for malignant neoplasm of breast   ARLENE KATLYN DIGITAL DIAGNOSTIC BILATERAL PER PROTOCOL      3. Vulvar itching        Restart clobetasol nightly for one week and then 2-3 times per week    Return if symptoms worsen or fail to improve.     Bel Velasquez MD

## 2022-12-21 DIAGNOSIS — Z12.31 ENCOUNTER FOR SCREENING MAMMOGRAM FOR MALIGNANT NEOPLASM OF BREAST: ICD-10-CM

## 2022-12-21 DIAGNOSIS — N64.4 MASTALGIA: ICD-10-CM

## 2023-01-03 ENCOUNTER — TELEPHONE (OUTPATIENT)
Dept: OBGYN | Age: 78
End: 2023-01-03

## 2023-01-03 DIAGNOSIS — N95.2 ATROPHIC VAGINITIS: ICD-10-CM

## 2023-01-03 DIAGNOSIS — N76.2 ATROPHIC VULVITIS: Primary | ICD-10-CM

## 2023-01-03 DIAGNOSIS — L29.2 VULVAR ITCHING: ICD-10-CM

## 2023-01-03 RX ORDER — ESTRADIOL 0.1 MG/G
1 CREAM VAGINAL
Qty: 3 EACH | Refills: 3 | Status: SHIPPED | OUTPATIENT
Start: 2023-01-05

## 2023-01-03 RX ORDER — CLOBETASOL PROPIONATE 0.5 MG/G
CREAM TOPICAL
Qty: 30 G | Refills: 4 | Status: SHIPPED | OUTPATIENT
Start: 2023-01-03

## 2023-01-03 NOTE — TELEPHONE ENCOUNTER
Pt requesting refill on 0.2 MG Estradiol Cream and Clobetasol. Pt due for annual 02/28/22. Pt states she has been in our office a lot and not coming in for an annual. Pt informed on the difference between OV and annual. Please advise.

## 2023-01-19 ENCOUNTER — TELEPHONE (OUTPATIENT)
Dept: OBGYN | Age: 78
End: 2023-01-19

## 2023-01-19 DIAGNOSIS — L29.2 VULVAR ITCHING: ICD-10-CM

## 2023-01-19 RX ORDER — CLOBETASOL PROPIONATE 0.5 MG/G
OINTMENT TOPICAL
Qty: 30 G | Refills: 0 | Status: SHIPPED | OUTPATIENT
Start: 2023-01-19

## 2023-01-19 NOTE — TELEPHONE ENCOUNTER
Both the cream and the ointment work the same. In some patients the base of the cream can irritate the vulva. This is why I prefer the ointment. It appears when she called in for refill we sent in the cream instead of the ointment. I have sent in a new prescription for the ointment.

## 2023-01-19 NOTE — TELEPHONE ENCOUNTER
Pt is wanting some clarification for medication that was prescribed. Pt picked up Clobetasol 0.05% cream from pharmacy. Pt states she was prescribed clobetasol 0.05% ointment. Pt is wanting to know which medication to use and if there is a difference between the two of the medications. What do you recommend ?

## 2023-01-26 ENCOUNTER — TELEPHONE (OUTPATIENT)
Dept: OBGYN | Age: 78
End: 2023-01-26

## 2023-01-26 NOTE — TELEPHONE ENCOUNTER
Ensure that patient is still taking her clobetasol ointment. She can use a small amount of this inside her vagina. In addition I have sent in a prescription for vaginal terconazole in case this is a yeast infection. Schedule office visit if symptoms continue.

## 2023-01-30 ENCOUNTER — TELEPHONE (OUTPATIENT)
Dept: OBGYN | Age: 78
End: 2023-01-30

## 2023-01-30 NOTE — TELEPHONE ENCOUNTER
Pt requesting instructions for medications. Pt is wanting to know if she can use the Terazol while taking the Estrace and Temovate. Please advise.

## 2023-01-31 NOTE — TELEPHONE ENCOUNTER
Continue Temovate. Stop Estrace while taking the vaginal terconazole.   Restart Estrace when finished vaginal terconazole

## 2023-02-06 ENCOUNTER — TELEPHONE (OUTPATIENT)
Dept: OBGYN | Age: 78
End: 2023-02-06

## 2023-02-10 ENCOUNTER — OFFICE VISIT (OUTPATIENT)
Dept: OBGYN | Age: 78
End: 2023-02-10
Payer: MEDICARE

## 2023-02-10 VITALS
BODY MASS INDEX: 24.17 KG/M2 | WEIGHT: 128 LBS | DIASTOLIC BLOOD PRESSURE: 59 MMHG | SYSTOLIC BLOOD PRESSURE: 101 MMHG | HEIGHT: 61 IN

## 2023-02-10 DIAGNOSIS — L29.2 VULVAR ITCHING: Primary | ICD-10-CM

## 2023-02-10 DIAGNOSIS — N89.8 VAGINAL IRRITATION: ICD-10-CM

## 2023-02-10 DIAGNOSIS — R10.2 VULVAR PAIN: ICD-10-CM

## 2023-02-10 PROCEDURE — 99213 OFFICE O/P EST LOW 20 MIN: CPT | Performed by: OBSTETRICS & GYNECOLOGY

## 2023-02-10 PROCEDURE — 3074F SYST BP LT 130 MM HG: CPT | Performed by: OBSTETRICS & GYNECOLOGY

## 2023-02-10 PROCEDURE — 1123F ACP DISCUSS/DSCN MKR DOCD: CPT | Performed by: OBSTETRICS & GYNECOLOGY

## 2023-02-10 PROCEDURE — 3078F DIAST BP <80 MM HG: CPT | Performed by: OBSTETRICS & GYNECOLOGY

## 2023-02-10 RX ORDER — FLUCONAZOLE 150 MG/1
150 TABLET ORAL ONCE
Qty: 1 TABLET | Refills: 0 | Status: SHIPPED | OUTPATIENT
Start: 2023-02-10 | End: 2023-02-10

## 2023-02-10 NOTE — PROGRESS NOTES
2/10/23    Carolynn Posadas  1945    Chief Complaint   Patient presents with    Vaginal Itching     Pt here to discuss vaginal irritation. Pt states she was on the vaginal cream and it started giving her a throbbing sensation leading to pain in pelvic area. Pt states she stopped the cream. Pt now c/o of vaginal and rectal itching. Carolynn Posadas is a 76 y.o. female who presents today for evaluation of throbbing in the groin. Along with vaginal and rectal itching despite vaginal estrogen cream, clobetasol, using nothing, terconazole. Past Medical History:   Diagnosis Date    Abdominal pain     Anemia     Angioedema 4/7/2014    APC (atrial premature contractions) 2/2012    Infrequent APC's-Dr Bravo    Arrhythmia 4/7/2014    Arthritis     Brain aneurysm      referred to [facility] Dr Perry Glasgow    CKD (chronic kidney disease)     Sees Dr. Valentina Calzada- \"stage 3\"    Depression with anxiety 2014    Dr. Karine Carlisle managing xanax and cymbalta    Diabetes mellitus ([facility] [address]     \"pre- diabetic\"\"no medication yet\" per pt on 4/19/2018    Difficulty swallowing     \"Large Pills\"    [address] [place]\"have fibromyalgia in all 18 trigger points and my hands an feet are swollen all the time- been to [facility] 4 times for this\"    GE reflux 2006    GERD (gastroesophageal reflux disease)     Groin pain     H/O cardiovascular stress test 2-3-12    2-3-12, EF 70 % normal study    H/O echocardiogram 2-3-12    2-3-12 EF 55%, normal test    Hemorrhoids     + colonoscopy 2011 Dr. Seth Kuhn    History of 24 hour EKG monitoring 2-7-12    48 hour holter, predonimant rhythm is SInus rhythum, Max  and min HR 64, infrequent PVC's and APC's.     [place] (hard of hearing)     Bilateral Hearing Aids    Hx of cardiovascular stress test 6/12/2015    lexiscan-mild ischemia apical,EF70%    Hx of cardiovascular stress test 01/30/2019    Normal Lexiscan nuclear scintigraphic study suggestive of normal myocardial perfusion. Gated images demonstrate normal left ventricular systolic function with EF of 60 %.     Hyperlipidemia     Hypertension     follows with Dr Heather Padilla    IBS (irritable bowel syndrome) 7/7/2013    Dr. Doris Hayward, IBS diet, immodium prn (2013)     Internal hemorrhoids 2011    Dr. Briana Mcfarland    Interstitial cystitis     Dr. Nohemy Hooker with cath    Lower back pain     MVP (mitral valve prolapse)     Nausea     Osteoporosis 10/21/2022    Palpitations     Following with  Dr. Vania Merchant, and on digoxin    Pap smear for cervical cancer screening     Dr. Gemini Izaguirre     Pulmonary nodule     9/14 CT chest 8mm nodule, no change, recheck 9/15    PVC (premature ventricular contraction) 2/2012    Infrequent PVC's-Dr. Darlene Mcneal    Right inguinal pain     referred from Dr. Juli Durán to Dr. Vianca Hensley then PT ( no hernia)    Shortness of breath on exertion     Skin cancer of nose In Past    Dr. Ishmael Saavedra,  previously see Dr. Briana Tamez    Teeth missing     Upper And Lower    Thigh pain     UTI (urinary tract infection) 03/2018    Vitamin D deficiency     Wears glasses     Wears partial dentures     Upper       Past Surgical History:   Procedure Laterality Date    BLADDER SURGERY  2000, 2008    Dr. Nohemy Hooker- dilatation     Karina Gall Dr. Juna Rinne - [facility] in [place];Grade 1 internal hemorrhoids    DENTAL SURGERY      Teeth Extracted In Past    ENDOSCOPY, COLON, DIAGNOSTIC  In Past    EYE SURGERY Left 11/2017    Cataract With Lens Implant    HYSTERECTOMY, TOTAL ABDOMINAL (CERVIX REMOVED)  1/14    \"took everything    SHOULDER ARTHROSCOPY Right 11/2017    rotator cuff repair, SAd, distal clavicle excision, extensive debridement    SKIN CANCER EXCISION  In Past    Nose    TONSILLECTOMY  1960's       Social History     Tobacco Use    Smoking status: Never    Smokeless tobacco: Never   Vaping Use    Vaping Use: Never used   Substance Use Topics    Alcohol use: No    Drug use: No       Family History   Problem Relation Age of Onset    Mental Illness Son     Arthritis Mother     Hearing Loss Mother     Heart Disease Mother         [place]oreta Nieto In [place]"    Stroke Father     Heart Attack Father     Heart Disease Father         Heart Attack    Diabetes Father     Other Brother         \"Colon Polyps\"    Heart Disease Brother         \"Heart Surgery\"       Current Outpatient Medications   Medication Sig Dispense Refill    fluconazole (DIFLUCAN) 150 MG tablet Take 1 tablet by mouth once for 1 dose 1 tablet 0    terconazole (TERAZOL 7) 0.4 % vaginal cream Place vaginally nightly. 45 g 0    clobetasol (TEMOVATE) 0.05 % ointment Apply topically nightly for one week then use two t0 three times weekly (do not use the days using vaginal estrogen) 30 g 0    estradiol (ESTRACE) 0.1 MG/GM vaginal cream Place 1 g vaginally Twice a Week 3 each 3    furosemide (LASIX) 40 MG tablet TAKE ONE (1) TABLET BY MOUTH AS DIRECTED ON MON-WED-FRI 45 tablet 3    Cholecalciferol (VITAMIN D3) 125 MCG (5000 UT) TABS Take by mouth      vitamin D (CHOLECALCIFEROL) 125 MCG (5000 UT) CAPS capsule Take 5,000 Units by mouth daily      digoxin (LANOXIN) 125 MCG tablet Take 1 tablet by mouth in the morning. Patient take 1 tab every other day and 1/2 tab on opposite days. 90 tablet 3    atenolol (TENORMIN) 50 MG tablet Take 0.5 tablets by mouth in the morning. Dose decrease as of 4/15/2013. 45 tablet 3    zinc oxide 13 % CREA Apply topically 2 times daily as needed for Rash 454 g 1    DULoxetine (CYMBALTA) 60 MG extended release capsule Take 60 mg by mouth daily      Probiotic Product (PROBIOTIC-10 PO) Take 1 tablet by mouth daily       folic acid (FOLVITE) 1 MG tablet Take 1 mg by mouth daily       aspirin 81 MG EC tablet Take 81 mg by mouth every morning Over The Counter      ALPRAZolam (XANAX) 0.5 MG tablet Take 1.5 mg by mouth 3 times daily.        nortriptyline (PAMELOR) 10 MG/5ML solution Take 2 ml by mouth nightly (clarification as of 2012)- please give 30 day supply with 3 refills (Patient taking differently: Take 5 mg by mouth nightly Take 2 ml by mouth nightly) 1 Bottle 5    gabapentin (NEURONTIN) 250 MG/5ML solution Take 4 mL by mouth daily. (Patient taking differently: Take 300 mg by mouth nightly. Take 4 mL by mouth daily.) 450 mL 3    Multiple Vitamin (MULTIVITAMIN PO) Take 1 tablet by mouth every morning Over The Counter       No current facility-administered medications for this visit. Allergies   Allergen Reactions    Demerol Rash    Pcn [Penicillins] Rash    Statins      \"Causes Muscle Aches And Pains\"    Sulfa Antibiotics Rash    Vicodin [Hydrocodone-Acetaminophen]      \"Dizziness\"    Zetia [Ezetimibe] Palpitations and Other (See Comments)     Heart palpitations  \"Heart Palpitations\"    Adhesive Tape Other (See Comments) and Rash     Muscles aches    Glycerin      \"tongue swells\"    Percocet [Oxycodone-Acetaminophen] Other (See Comments)     \"hallucination\"    Meperidine Rash    Prednisone Palpitations           Immunization History   Administered Date(s) Administered    COVID-19, PFIZER PURPLE top, DILUTE for use, (age 15 y+), 30mcg/0.3mL 2020, 2021, 10/18/2021    Influenza Vaccine, unspecified formulation 10/16/2018    Influenza, High Dose (Fluzone 65 yrs and older) 2015, 10/03/2016, 10/03/2017, 10/03/2020    Pneumococcal Conjugate 13-valent ([id number]) 2015, 10/03/2017    Pneumococcal Polysaccharide ([id number]) 2011       Review of Systems    BP (!) 101/59 (Site: Left Upper Arm, Position: Sitting, Cuff Size: Small Adult)   Ht 5' 1\" (1.549 m)   Wt 128 lb (58.1 kg)   BMI 24.19 kg/m²     Physical Exam  Exam conducted with a chaperone present. Constitutional:       Appearance: She is normal weight. HENT:      Head: Normocephalic and atraumatic. Nose: Nose normal.   Eyes:      Conjunctiva/sclera: Conjunctivae normal.   Cardiovascular:      Rate and Rhythm: Normal rate. Pulses: Normal pulses. Pulmonary:      Effort: Pulmonary effort is normal.   Abdominal:      General: Abdomen is flat. Bowel sounds are normal. There is no distension. Palpations: Abdomen is soft. There is no mass. Tenderness: There is no abdominal tenderness. Hernia: A hernia is present. Genitourinary:     General: Normal vulva. Exam position: Lithotomy position. Musculoskeletal:      Cervical back: Normal range of motion and neck supple. Skin:     General: Skin is warm and dry. Neurological:      General: No focal deficit present. Mental Status: She is alert and oriented to person, place, and time. Refuses gyn exam today    No results found for this visit on 02/10/23. ASSESSMENT AND PLAN   Diagnosis Orders   1. Vulvar itching  fluconazole (DIFLUCAN) 150 MG tablet      2. Vulvar pain        3. Vaginal irritation  fluconazole (DIFLUCAN) 150 MG tablet        Ise clobetasol 3 times weekly and estrace cream 2 times weekly    Return if symptoms worsen or fail to improve.     Valery Ferrer MD

## 2023-05-24 ENCOUNTER — OFFICE VISIT (OUTPATIENT)
Dept: CARDIOLOGY CLINIC | Age: 78
End: 2023-05-24
Payer: MEDICARE

## 2023-05-24 VITALS
DIASTOLIC BLOOD PRESSURE: 78 MMHG | SYSTOLIC BLOOD PRESSURE: 118 MMHG | HEIGHT: 62 IN | WEIGHT: 128 LBS | BODY MASS INDEX: 23.55 KG/M2 | HEART RATE: 67 BPM

## 2023-05-24 DIAGNOSIS — I10 ESSENTIAL HYPERTENSION: Primary | ICD-10-CM

## 2023-05-24 PROCEDURE — 3074F SYST BP LT 130 MM HG: CPT | Performed by: INTERNAL MEDICINE

## 2023-05-24 PROCEDURE — 1123F ACP DISCUSS/DSCN MKR DOCD: CPT | Performed by: INTERNAL MEDICINE

## 2023-05-24 PROCEDURE — 3078F DIAST BP <80 MM HG: CPT | Performed by: INTERNAL MEDICINE

## 2023-05-24 PROCEDURE — 99214 OFFICE O/P EST MOD 30 MIN: CPT | Performed by: INTERNAL MEDICINE

## 2023-05-24 RX ORDER — DIGOXIN 125 MCG
125 TABLET ORAL DAILY
Qty: 90 TABLET | Refills: 3 | Status: SHIPPED | OUTPATIENT
Start: 2023-05-24

## 2023-05-24 RX ORDER — ATENOLOL 50 MG/1
25 TABLET ORAL DAILY
Qty: 45 TABLET | Refills: 3 | Status: SHIPPED | OUTPATIENT
Start: 2023-05-24

## 2023-05-24 NOTE — PROGRESS NOTES
CARDIOLOGY NOTE      5/24/2023    RE: Ely Katz  (1945)                               TO:  Dr. Selene Faust MD            Wilma Vazquez is a 76 y.o. female who was seen today for management of hypertension                                    HPI:                   Pt has h/o hypertension arrhythmia, hyperlipidemia, seen today for follow-up.  Pt has no cardiac complaints  Pt moving to [address] [place] [address] has the following history recorded in care path:  Patient Active Problem List    Diagnosis Date Noted    Essential hypertension 04/15/2013    Palpitations     Fibromyalgia 03/21/2012    Anemia 12/29/2011    Depression with anxiety 12/20/2011    Osteoporosis 10/21/2022    Rash 04/15/2013    Fatigue 07/08/2012    Insomnia 01/31/2012    Vitamin D deficiency 12/29/2011    Lymphedema 02/21/2017    CKD (chronic kidney disease) stage 3, GFR 30-59 ml/min ([facility]) 09/13/2016    Benign essential hypertension 09/13/2016    Coronary artery disease involving native coronary artery of native heart without angina pectoris 09/13/2016    Anxiety 09/13/2016    Mixed hyperlipidemia 11/19/2015    Chronic kidney disease-mineral and bone disorder 11/19/2015    Arrhythmia 04/07/2014    Depression 04/07/2014    Chronic interstitial cystitis 04/07/2014    Angioedema 04/07/2014    IBS (irritable bowel syndrome) 07/07/2013    Pulmonary nodule 07/17/2021    Lipedema 05/28/2021    Kidney cysts 03/23/2021    Hypertensive renal disease 12/21/2020    Edema leg 12/11/2020    Chronic kidney disease, stage IV (severe) ([address] 10/15/2020    Stage 3b chronic kidney disease 01/21/2020    Acute cystitis without hematuria 05/09/2018    Brain aneurysm 01/25/2018    Depression, major, recurrent, moderate ([address] 01/25/2018    Edema of both lower legs due to peripheral venous insufficiency 01/25/2018    Altered mental status 11/19/2017    Complete tear of right rotator cuff     Labral tear of long head of right biceps tendon     DJD

## 2023-05-24 NOTE — PATIENT INSTRUCTIONS
**It is YOUR responsibilty to bring medication bottles and/or updated medication list to [address] [place] [address]. This will allow us to better serve you and all your healthcare needs**    Please be informed that if you contact our office outside of normal business hours the physician on call cannot help with any scheduling or rescheduling issues, procedure instruction questions or any type of medication issue. We advise you for any urgent/emergency that you go to the nearest emergency room! PLEASE CALL OUR OFFICE DURING NORMAL BUSINESS HOURS    Monday - Friday   8 am to 5 pm    [address] [address]: [phone]    [place]:  [phone]    Thank you for allowing us to care for you today! We want to ensure we can follow your treatment plan and we strive to give you the best outcomes and experience possible. If you ever have a life threatening emergency and call 911 - for an ambulance (EMS)   Our providers can only care for you at:   [facility] or [facility]. Even if you have someone take you or you drive yourself we can only care for you in a [address] [facility] facility. Our providers are not setup at the other healthcare locations!

## 2023-05-25 ENCOUNTER — TELEPHONE (OUTPATIENT)
Dept: CARDIOLOGY CLINIC | Age: 78
End: 2023-05-25

## 2023-05-25 NOTE — TELEPHONE ENCOUNTER
Patient requested all her medical records- to be picked up at this office. I called the patient and she will pick them up.

## 2023-06-07 ENCOUNTER — TELEPHONE (OUTPATIENT)
Dept: OBGYN | Age: 78
End: 2023-06-07

## 2023-06-07 DIAGNOSIS — N76.2 ATROPHIC VULVITIS: ICD-10-CM

## 2023-06-07 DIAGNOSIS — N95.2 ATROPHIC VAGINITIS: ICD-10-CM

## 2023-06-07 RX ORDER — ESTRADIOL 0.1 MG/G
1 CREAM VAGINAL
Qty: 3 EACH | Refills: 2 | Status: SHIPPED | OUTPATIENT
Start: 2023-06-08

## 2023-08-01 PROBLEM — R73.03 PREDIABETES: Status: ACTIVE | Noted: 2023-08-01

## 2023-08-01 PROBLEM — R73.01 IFG (IMPAIRED FASTING GLUCOSE): Status: ACTIVE | Noted: 2019-01-14

## 2023-08-01 PROBLEM — N39.0 FREQUENT UTI: Status: ACTIVE | Noted: 2018-07-16

## 2023-08-01 PROBLEM — E11.9 DIABETES MELLITUS (HCC): Status: ACTIVE | Noted: 2022-02-17

## 2023-08-02 PROBLEM — I67.89 CEREBRAL MICROVASCULAR DISEASE: Status: ACTIVE | Noted: 2023-08-02

## 2023-08-02 PROBLEM — I49.1 PREMATURE ATRIAL CONTRACTION: Status: ACTIVE | Noted: 2023-08-02

## 2023-08-16 PROBLEM — N30.01 ACUTE CYSTITIS WITH HEMATURIA: Status: ACTIVE | Noted: 2018-05-09

## 2023-09-01 ENCOUNTER — NEW PATIENT (OUTPATIENT)
Dept: URBAN - METROPOLITAN AREA CLINIC 9 | Facility: CLINIC | Age: 78
End: 2023-09-01

## 2023-09-01 DIAGNOSIS — H26.493: ICD-10-CM

## 2023-09-01 DIAGNOSIS — H52.223: ICD-10-CM

## 2023-09-01 DIAGNOSIS — H18.593: ICD-10-CM

## 2023-09-01 DIAGNOSIS — H04.123: ICD-10-CM

## 2023-09-01 PROCEDURE — 92015 DETERMINE REFRACTIVE STATE: CPT

## 2023-09-01 PROCEDURE — 92004 COMPRE OPH EXAM NEW PT 1/>: CPT

## 2023-09-01 ASSESSMENT — TONOMETRY
OS_IOP_MMHG: 15
OD_IOP_MMHG: 13

## 2023-09-01 ASSESSMENT — VISUAL ACUITY
OU_CC: 20/20-1
OD_CC: 20/25-1
OS_CC: 20/25+1

## 2023-09-01 ASSESSMENT — KERATOMETRY
OD_K1POWER_DIOPTERS: 39.75
OS_K1POWER_DIOPTERS: 40.25
OD_AXISANGLE_DEGREES: 126
OS_AXISANGLE2_DEGREES: 6
OS_AXISANGLE_DEGREES: 96
OS_K2POWER_DIOPTERS: 41.50
OD_AXISANGLE2_DEGREES: 36
OD_K2POWER_DIOPTERS: 41

## 2023-11-07 PROBLEM — G25.0 ESSENTIAL TREMOR: Status: ACTIVE | Noted: 2023-11-07

## 2023-12-18 ENCOUNTER — FOLLOW UP (OUTPATIENT)
Facility: LOCATION | Age: 78
End: 2023-12-18

## 2023-12-18 DIAGNOSIS — H04.123: ICD-10-CM

## 2023-12-18 PROCEDURE — 99213 OFFICE O/P EST LOW 20 MIN: CPT

## 2023-12-18 ASSESSMENT — KERATOMETRY
OS_AXISANGLE2_DEGREES: 6
OD_K1POWER_DIOPTERS: 39.75
OD_AXISANGLE2_DEGREES: 36
OD_K2POWER_DIOPTERS: 41
OS_K2POWER_DIOPTERS: 41.50
OD_AXISANGLE_DEGREES: 126
OS_K1POWER_DIOPTERS: 40.25
OS_AXISANGLE_DEGREES: 96

## 2023-12-18 ASSESSMENT — TONOMETRY
OS_IOP_MMHG: 10
OD_IOP_MMHG: 12

## 2023-12-18 ASSESSMENT — VISUAL ACUITY
OD_SC: 20/40-2
OS_SC: 20/30-2

## 2024-09-23 ENCOUNTER — COMPREHENSIVE EXAM (OUTPATIENT)
Facility: LOCATION | Age: 79
End: 2024-09-23

## 2025-01-19 ENCOUNTER — HOSPITAL ENCOUNTER (INPATIENT)
Age: 80
LOS: 1 days | Discharge: HOME | DRG: 309 | End: 2025-01-20
Attending: INTERNAL MEDICINE | Admitting: STUDENT IN AN ORGANIZED HEALTH CARE EDUCATION/TRAINING PROGRAM
Payer: MEDICARE

## 2025-01-19 DIAGNOSIS — I48.91 ATRIAL FIBRILLATION, UNSPECIFIED TYPE (HCC): Primary | ICD-10-CM

## 2025-01-19 DIAGNOSIS — I48.0 PAROXYSMAL ATRIAL FIBRILLATION (HCC): ICD-10-CM

## 2025-01-19 LAB
BNP SERPL-MCNC: 6523 PG/ML (ref 0–450)
TROPONIN I SERPL HS-MCNC: 94 NG/L (ref 0–14)
TSH SERPL DL<=0.05 MIU/L-ACNC: 3.06 UIU/ML (ref 0.27–4.2)

## 2025-01-19 PROCEDURE — 84443 ASSAY THYROID STIM HORMONE: CPT

## 2025-01-19 PROCEDURE — 83880 ASSAY OF NATRIURETIC PEPTIDE: CPT

## 2025-01-19 PROCEDURE — 84484 ASSAY OF TROPONIN QUANT: CPT

## 2025-01-19 PROCEDURE — 99223 1ST HOSP IP/OBS HIGH 75: CPT | Performed by: INTERNAL MEDICINE

## 2025-01-19 PROCEDURE — 93005 ELECTROCARDIOGRAM TRACING: CPT | Performed by: STUDENT IN AN ORGANIZED HEALTH CARE EDUCATION/TRAINING PROGRAM

## 2025-01-19 PROCEDURE — 2060000000 HC ICU INTERMEDIATE R&B

## 2025-01-19 PROCEDURE — 2580000003 HC RX 258: Performed by: STUDENT IN AN ORGANIZED HEALTH CARE EDUCATION/TRAINING PROGRAM

## 2025-01-19 PROCEDURE — 6370000000 HC RX 637 (ALT 250 FOR IP): Performed by: STUDENT IN AN ORGANIZED HEALTH CARE EDUCATION/TRAINING PROGRAM

## 2025-01-19 PROCEDURE — 6370000000 HC RX 637 (ALT 250 FOR IP): Performed by: INTERNAL MEDICINE

## 2025-01-19 PROCEDURE — 36415 COLL VENOUS BLD VENIPUNCTURE: CPT

## 2025-01-19 PROCEDURE — 94761 N-INVAS EAR/PLS OXIMETRY MLT: CPT

## 2025-01-19 PROCEDURE — 2500000003 HC RX 250 WO HCPCS: Performed by: STUDENT IN AN ORGANIZED HEALTH CARE EDUCATION/TRAINING PROGRAM

## 2025-01-19 RX ORDER — SODIUM CHLORIDE 9 MG/ML
INJECTION, SOLUTION INTRAVENOUS PRN
Status: DISCONTINUED | OUTPATIENT
Start: 2025-01-19 | End: 2025-01-20 | Stop reason: HOSPADM

## 2025-01-19 RX ORDER — ALPRAZOLAM 0.25 MG/1
0.25 TABLET ORAL 3 TIMES DAILY PRN
Status: DISCONTINUED | OUTPATIENT
Start: 2025-01-19 | End: 2025-01-19

## 2025-01-19 RX ORDER — ENOXAPARIN SODIUM 100 MG/ML
40 INJECTION SUBCUTANEOUS DAILY
Status: DISCONTINUED | OUTPATIENT
Start: 2025-01-20 | End: 2025-01-19

## 2025-01-19 RX ORDER — ONDANSETRON 4 MG/1
4 TABLET, ORALLY DISINTEGRATING ORAL EVERY 8 HOURS PRN
Status: DISCONTINUED | OUTPATIENT
Start: 2025-01-19 | End: 2025-01-20 | Stop reason: HOSPADM

## 2025-01-19 RX ORDER — POLYETHYLENE GLYCOL 3350 17 G/17G
17 POWDER, FOR SOLUTION ORAL DAILY PRN
Status: DISCONTINUED | OUTPATIENT
Start: 2025-01-19 | End: 2025-01-20 | Stop reason: HOSPADM

## 2025-01-19 RX ORDER — ACETAMINOPHEN 650 MG/1
650 SUPPOSITORY RECTAL EVERY 6 HOURS PRN
Status: DISCONTINUED | OUTPATIENT
Start: 2025-01-19 | End: 2025-01-20 | Stop reason: HOSPADM

## 2025-01-19 RX ORDER — ALPRAZOLAM 0.5 MG
1 TABLET ORAL 3 TIMES DAILY PRN
Status: DISCONTINUED | OUTPATIENT
Start: 2025-01-19 | End: 2025-01-20 | Stop reason: HOSPADM

## 2025-01-19 RX ORDER — ATENOLOL 25 MG/1
12.5 TABLET ORAL DAILY
Status: DISCONTINUED | OUTPATIENT
Start: 2025-01-19 | End: 2025-01-20 | Stop reason: HOSPADM

## 2025-01-19 RX ORDER — FOLIC ACID 1 MG/1
1 TABLET ORAL DAILY
Status: DISCONTINUED | OUTPATIENT
Start: 2025-01-20 | End: 2025-01-20 | Stop reason: HOSPADM

## 2025-01-19 RX ORDER — POTASSIUM CHLORIDE 7.45 MG/ML
10 INJECTION INTRAVENOUS PRN
Status: DISCONTINUED | OUTPATIENT
Start: 2025-01-19 | End: 2025-01-20

## 2025-01-19 RX ORDER — FUROSEMIDE 40 MG/1
40 TABLET ORAL DAILY
Status: DISCONTINUED | OUTPATIENT
Start: 2025-01-19 | End: 2025-01-20 | Stop reason: HOSPADM

## 2025-01-19 RX ORDER — NORTRIPTYLINE HYDROCHLORIDE 10 MG/1
10 CAPSULE ORAL NIGHTLY
Status: DISCONTINUED | OUTPATIENT
Start: 2025-01-19 | End: 2025-01-20 | Stop reason: HOSPADM

## 2025-01-19 RX ORDER — 0.9 % SODIUM CHLORIDE 0.9 %
500 INTRAVENOUS SOLUTION INTRAVENOUS ONCE
Status: COMPLETED | OUTPATIENT
Start: 2025-01-19 | End: 2025-01-19

## 2025-01-19 RX ORDER — ONDANSETRON 2 MG/ML
4 INJECTION INTRAMUSCULAR; INTRAVENOUS EVERY 6 HOURS PRN
Status: DISCONTINUED | OUTPATIENT
Start: 2025-01-19 | End: 2025-01-20 | Stop reason: HOSPADM

## 2025-01-19 RX ORDER — ALLOPURINOL 100 MG/1
50 TABLET ORAL DAILY
Status: DISCONTINUED | OUTPATIENT
Start: 2025-01-20 | End: 2025-01-20 | Stop reason: HOSPADM

## 2025-01-19 RX ORDER — ASPIRIN 81 MG/1
81 TABLET, CHEWABLE ORAL EVERY MORNING
Status: DISCONTINUED | OUTPATIENT
Start: 2025-01-19 | End: 2025-01-20 | Stop reason: HOSPADM

## 2025-01-19 RX ORDER — SODIUM CHLORIDE 0.9 % (FLUSH) 0.9 %
5-40 SYRINGE (ML) INJECTION EVERY 12 HOURS SCHEDULED
Status: DISCONTINUED | OUTPATIENT
Start: 2025-01-19 | End: 2025-01-20 | Stop reason: HOSPADM

## 2025-01-19 RX ORDER — ALPRAZOLAM 0.5 MG
1 TABLET ORAL 3 TIMES DAILY PRN
Status: DISCONTINUED | OUTPATIENT
Start: 2025-01-19 | End: 2025-01-19

## 2025-01-19 RX ORDER — SODIUM CHLORIDE 0.9 % (FLUSH) 0.9 %
5-40 SYRINGE (ML) INJECTION PRN
Status: DISCONTINUED | OUTPATIENT
Start: 2025-01-19 | End: 2025-01-20 | Stop reason: HOSPADM

## 2025-01-19 RX ORDER — ACETAMINOPHEN 325 MG/1
650 TABLET ORAL EVERY 6 HOURS PRN
Status: DISCONTINUED | OUTPATIENT
Start: 2025-01-19 | End: 2025-01-20 | Stop reason: HOSPADM

## 2025-01-19 RX ORDER — POTASSIUM CHLORIDE 1500 MG/1
40 TABLET, EXTENDED RELEASE ORAL PRN
Status: DISCONTINUED | OUTPATIENT
Start: 2025-01-19 | End: 2025-01-20

## 2025-01-19 RX ORDER — GABAPENTIN 300 MG/1
300 CAPSULE ORAL NIGHTLY
Status: DISCONTINUED | OUTPATIENT
Start: 2025-01-19 | End: 2025-01-20 | Stop reason: HOSPADM

## 2025-01-19 RX ORDER — DULOXETIN HYDROCHLORIDE 30 MG/1
60 CAPSULE, DELAYED RELEASE ORAL DAILY
Status: DISCONTINUED | OUTPATIENT
Start: 2025-01-19 | End: 2025-01-20 | Stop reason: HOSPADM

## 2025-01-19 RX ORDER — NORTRIPTYLINE HYDROCHLORIDE 10 MG/1
10 CAPSULE ORAL DAILY
Status: DISCONTINUED | OUTPATIENT
Start: 2025-01-20 | End: 2025-01-19

## 2025-01-19 RX ORDER — MAGNESIUM SULFATE IN WATER 40 MG/ML
2000 INJECTION, SOLUTION INTRAVENOUS PRN
Status: DISCONTINUED | OUTPATIENT
Start: 2025-01-19 | End: 2025-01-20

## 2025-01-19 RX ADMIN — SODIUM CHLORIDE 500 ML: 9 INJECTION, SOLUTION INTRAVENOUS at 12:17

## 2025-01-19 RX ADMIN — GABAPENTIN 300 MG: 300 CAPSULE ORAL at 22:11

## 2025-01-19 RX ADMIN — DULOXETINE HYDROCHLORIDE 60 MG: 30 CAPSULE, DELAYED RELEASE ORAL at 18:28

## 2025-01-19 RX ADMIN — APIXABAN 2.5 MG: 2.5 TABLET, FILM COATED ORAL at 22:11

## 2025-01-19 RX ADMIN — NORTRIPTYLINE HYDROCHLORIDE 10 MG: 10 CAPSULE ORAL at 22:12

## 2025-01-19 RX ADMIN — ALPRAZOLAM 1 MG: 0.5 TABLET ORAL at 22:11

## 2025-01-19 RX ADMIN — SODIUM CHLORIDE, PRESERVATIVE FREE 10 ML: 5 INJECTION INTRAVENOUS at 22:16

## 2025-01-19 NOTE — CONSULTS
Pt presents to the hospital via [facility]     EKG in [place] showed AFIB @ 92/min  C/o tiredness and fatigue     HTN  Long standing hx of palpitations   ?? PAF  EKG shows SR and tele shows NSR as well? Some PACs   We need EKG from the [place] ER to see if she had AFIB or SR with PACs       Will get records  Hold of OAC until AF proven   ECHO in am       Full note to follow       
Oral Daily PRN Antonio Page MD        acetaminophen (TYLENOL) tablet 650 mg  650 mg Oral Q6H PRN Antonio Page MD        Or    acetaminophen (TYLENOL) suppository 650 mg  650 mg Rectal Q6H PRN Antonio Page MD        [START ON 1/20/2025] allopurinol (ZYLOPRIM) tablet 50 mg  50 mg Oral Daily Antonio Page MD        aspirin chewable tablet 81 mg  81 mg Oral QAM Antonio Page MD        [START ON 1/20/2025] DULoxetine (CYMBALTA) extended release capsule 60 mg  60 mg Oral Daily Antonio Page MD        [START ON 1/20/2025] folic acid (FOLVITE) tablet 1 mg  1 mg Oral Daily Antonio Page MD        [Held by provider] furosemide (LASIX) tablet 40 mg  40 mg Oral Daily Antonio Page MD        nortriptyline (PAMELOR) capsule 10 mg  10 mg Oral Nightly Antonio Page MD        ALPRAZolam (XANAX) tablet 0.25 mg  0.25 mg Oral TID PRN Antonio Page MD             Review of Systems:      Constitutional: No Weight Loss   Eyes: No Decreased Vision  ENT: No Headaches   Cardiovascular:    no chest pain,   no dyspnea on exertion,   no palpitations or loss of consciousness  Respiratory: No cough or wheezing    Gastrointestinal: No abdominal pain  Genitourinary: No dysuria, trouble voiding   Musculoskeletal:  No gait disturbance, weakness or joint complaints  Integumentary: No rash or pruritis  Neurological:   No TIA or stroke symptoms  Psychiatric: No anxiety    Endocrine: No malaise, fatigue   Hematologic/Lymphatic: No bleeding problems, blood clots   Allergic/Immunologic: No nasal congestion     All other systems were reviewed and were negative otherwise.         Physical Examination:      Vitals:    01/19/25 1400   BP: (!) 104/48   Pulse: 76   Resp: 16   Temp:    SpO2: 91%      Wt Readings from Last 3 Encounters:   09/26/24 58.6 kg (129 lb 3.2 oz)   05/24/24 59.4 kg (131 lb)   05/07/24 59.4 kg (131 lb)     There is no height or weight on file to calculate BMI.    General Appearance:  No distress  Constitutional:  Well developed   HEENT:

## 2025-01-19 NOTE — FLOWSHEET NOTE
4 Eyes Skin Assessment     NAME:  Virgie Segura  YOB: 1945  MEDICAL RECORD NUMBER:  [id number]    The patient is being assessed for  Admission    I agree that at least one RN has performed a thorough Head to Toe Skin Assessment on the patient. ALL assessment sites listed below have been assessed.      Areas assessed by both nurses:    Head, Face, Ears, Shoulders, Back, Chest, Arms, Elbows, Hands, Sacrum. Buttock, Coccyx, Ischium, and Legs. Feet and Heels        Does the Patient have a Wound? No noted wound(s)       Scott Prevention initiated by RN: No  Wound Care Orders initiated by RN: No    Pressure Injury (Stage 3,4, Unstageable, DTI, NWPT, and Complex wounds) if present, place Wound referral order by RN under : No    New Ostomies, if present place, Ostomy referral order under : No     Nurse 1 eSignature: Electronically signed by Cherelle Lewis RN on 1/19/25 at 3:30 PM EST    **SHARE this note so that the co-signing nurse can place an eSignature**    Nurse 2 eSignature: Electronically signed by Emelyn Mathias RN on 1/19/25 at 3:31 PM EST

## 2025-01-19 NOTE — H&P
V2.0  History and Physical      Name:  Virgie Segura /Age/Sex: 1945  (79 y.o. female)   MRN & CSN:  [id number] & [id number] Encounter Date/Time: 2025 11:55 AM EST   Location:  -A PCP: No primary care provider on file.       Hospital Day: 1      History of Present Illness:     Chief Complaint: palpitations    Virgie Segura is a 79 y.o. female with PMH of HTN, palpitations who presents with palptiations.  Pt reports feeling fatigue lately. She also reports lightheadedness over the past few days. Pt reports one episode of chest pain lasting one minute 2 days ago. She also reports feeling sick with URI symptoms and poor oral intake over the past few days. She denies n/v/d and urinary symptoms. Pt still has cough from the cold she had.     At [facility] ED, pt was found in afib-rvr HR reaching 120's. She received dilt IV 5 mg. Blood pressure mildly dropped to the 90's but otherwise she was stable. Admitting hospitalist discussed with ED who accepted pt to step down for afib-rvr   workup and management.   Assessment and Plan:   Atrial fibrillation-RVR.   No hx of afib but has hx of palpitations on home atenlolol  [id number] Score  7  Resolved afte dilt 5 IV dose given in ED now HR 70's  Suspect triggered by dehydration, and poor oral intake  - monitor on tele  - give bolus IVF  - check ECHO  - cards consulted  - now on Eliquis  - home atenolol resumed     Elevated troponin 176->200  Suspect from afib-rvr. No chest pain. No ischemic changes on ECG  - check trop  - monitor on tele  - cards consulted  - repeat ECG now rate is controlled    KAHLIL. Suspect 2/2 dehydration and afib-rvr  On home lasix  - give bolus iVF  - avoid nephrotoxins  - monitor I/o; bladder scan if needed    Essential HTN  On home lasix and atenolol  - hold lasix given soft BP and KAHLIL       Code Status: Code status was discussed in detail with Patient/POA; verbalized understanding of the different types of code status; Patient/POA

## 2025-01-20 ENCOUNTER — APPOINTMENT (OUTPATIENT)
Dept: NON INVASIVE DIAGNOSTICS | Age: 80
DRG: 309 | End: 2025-01-20
Attending: INTERNAL MEDICINE
Payer: MEDICARE

## 2025-01-20 VITALS
SYSTOLIC BLOOD PRESSURE: 132 MMHG | DIASTOLIC BLOOD PRESSURE: 65 MMHG | WEIGHT: 126.32 LBS | HEART RATE: 75 BPM | BODY MASS INDEX: 24.8 KG/M2 | RESPIRATION RATE: 17 BRPM | OXYGEN SATURATION: 96 % | HEIGHT: 60 IN | TEMPERATURE: 98.4 F

## 2025-01-20 LAB
ANION GAP SERPL CALCULATED.3IONS-SCNC: 11 MMOL/L (ref 9–17)
BASOPHILS # BLD: 0.01 K/UL
BASOPHILS NFR BLD: 0 % (ref 0–1)
BUN SERPL-MCNC: 39 MG/DL (ref 7–20)
CALCIUM SERPL-MCNC: 7.8 MG/DL (ref 8.3–10.6)
CHLORIDE SERPL-SCNC: 104 MMOL/L (ref 99–110)
CO2 SERPL-SCNC: 27 MMOL/L (ref 21–32)
CREAT SERPL-MCNC: 1.7 MG/DL (ref 0.6–1.2)
ECHO AO ROOT DIAM: 2.8 CM
ECHO AO ROOT INDEX: 1.82 CM/M2
ECHO AV AREA PEAK VELOCITY: 2 CM2
ECHO AV AREA VTI: 2.6 CM2
ECHO AV AREA/BSA PEAK VELOCITY: 1.3 CM2/M2
ECHO AV AREA/BSA VTI: 1.7 CM2/M2
ECHO AV MEAN GRADIENT: 4 MMHG
ECHO AV MEAN VELOCITY: 0.9 M/S
ECHO AV PEAK GRADIENT: 8 MMHG
ECHO AV PEAK VELOCITY: 1.4 M/S
ECHO AV VELOCITY RATIO: 0.64
ECHO AV VTI: 23.3 CM
ECHO BSA: 1.56 M2
ECHO EST RA PRESSURE: 3 MMHG
ECHO IVC PROX: 1.4 CM
ECHO LA AREA 4C: 13.6 CM2
ECHO LA DIAMETER INDEX: 2.08 CM/M2
ECHO LA DIAMETER: 3.2 CM
ECHO LA MAJOR AXIS: 5.3 CM
ECHO LA TO AORTIC ROOT RATIO: 1.14
ECHO LA VOL MOD A4C: 26 ML (ref 22–52)
ECHO LA VOLUME INDEX MOD A4C: 17 ML/M2 (ref 16–34)
ECHO LV E' LATERAL VELOCITY: 8.5 CM/S
ECHO LV E' SEPTAL VELOCITY: 10.2 CM/S
ECHO LV EDV A4C: 59 ML
ECHO LV EDV INDEX A4C: 38 ML/M2
ECHO LV EF PHYSICIAN: 55 %
ECHO LV EJECTION FRACTION A4C: 61 %
ECHO LV ESV A4C: 23 ML
ECHO LV ESV INDEX A4C: 15 ML/M2
ECHO LV INTERNAL DIMENSION DIASTOLE INDEX: 2.4 CM/M2
ECHO LV INTERNAL DIMENSION DIASTOLIC: 3.7 CM (ref 3.9–5.3)
ECHO LV IVSD: 1.1 CM (ref 0.6–0.9)
ECHO LV MASS 2D: 104.6 G (ref 67–162)
ECHO LV MASS INDEX 2D: 67.9 G/M2 (ref 43–95)
ECHO LV POSTERIOR WALL DIASTOLIC: 0.8 CM (ref 0.6–0.9)
ECHO LV RELATIVE WALL THICKNESS RATIO: 0.43
ECHO LVOT AREA: 3.1 CM2
ECHO LVOT AV VTI INDEX: 0.86
ECHO LVOT DIAM: 2 CM
ECHO LVOT MEAN GRADIENT: 2 MMHG
ECHO LVOT PEAK GRADIENT: 4 MMHG
ECHO LVOT PEAK VELOCITY: 0.9 M/S
ECHO LVOT STROKE VOLUME INDEX: 40.8 ML/M2
ECHO LVOT SV: 62.8 ML
ECHO LVOT VTI: 20 CM
ECHO MV A VELOCITY: 0.78 M/S
ECHO MV E VELOCITY: 0.7 M/S
ECHO MV E/A RATIO: 0.9
ECHO MV E/E' LATERAL: 8.24
ECHO MV E/E' RATIO (AVERAGED): 7.55
ECHO MV E/E' SEPTAL: 6.86
ECHO RIGHT VENTRICULAR SYSTOLIC PRESSURE (RVSP): 30 MMHG
ECHO RV MID DIMENSION: 1.9 CM
ECHO TV REGURGITANT MAX VELOCITY: 2.58 M/S
ECHO TV REGURGITANT PEAK GRADIENT: 27 MMHG
EOSINOPHIL # BLD: 0 K/UL
EOSINOPHILS RELATIVE PERCENT: 0 % (ref 0–3)
ERYTHROCYTE [DISTWIDTH] IN BLOOD BY AUTOMATED COUNT: 15.5 % (ref 11.7–14.9)
GFR, ESTIMATED: 30 ML/MIN/1.73M2
GLUCOSE SERPL-MCNC: 82 MG/DL (ref 74–99)
HCT VFR BLD AUTO: 34.8 % (ref 37–47)
HGB BLD-MCNC: 10.8 G/DL (ref 12.5–16)
IMM GRANULOCYTES # BLD AUTO: 0.02 K/UL
IMM GRANULOCYTES NFR BLD: 0 %
LYMPHOCYTES NFR BLD: 1.4 K/UL
LYMPHOCYTES RELATIVE PERCENT: 30 % (ref 24–44)
MCH RBC QN AUTO: 28.1 PG (ref 27–31)
MCHC RBC AUTO-ENTMCNC: 31 G/DL (ref 32–36)
MCV RBC AUTO: 90.6 FL (ref 78–100)
MONOCYTES NFR BLD: 0.61 K/UL
MONOCYTES NFR BLD: 13 % (ref 0–4)
NEUTROPHILS NFR BLD: 56 % (ref 36–66)
NEUTS SEG NFR BLD: 2.59 K/UL
PLATELET, FLUORESCENCE: 112 K/UL (ref 140–440)
PMV BLD AUTO: 11.1 FL (ref 7.5–11.1)
POTASSIUM SERPL-SCNC: 3.2 MMOL/L (ref 3.5–5.1)
RBC # BLD AUTO: 3.84 M/UL (ref 4.2–5.4)
SODIUM SERPL-SCNC: 142 MMOL/L (ref 136–145)
WBC OTHER # BLD: 4.6 K/UL (ref 4–10.5)

## 2025-01-20 PROCEDURE — APPNB15 APP NON BILLABLE TIME 0-15 MINS

## 2025-01-20 PROCEDURE — 93306 TTE W/DOPPLER COMPLETE: CPT

## 2025-01-20 PROCEDURE — 85025 COMPLETE CBC W/AUTO DIFF WBC: CPT

## 2025-01-20 PROCEDURE — 6370000000 HC RX 637 (ALT 250 FOR IP): Performed by: STUDENT IN AN ORGANIZED HEALTH CARE EDUCATION/TRAINING PROGRAM

## 2025-01-20 PROCEDURE — 94761 N-INVAS EAR/PLS OXIMETRY MLT: CPT

## 2025-01-20 PROCEDURE — 93306 TTE W/DOPPLER COMPLETE: CPT | Performed by: INTERNAL MEDICINE

## 2025-01-20 PROCEDURE — 80048 BASIC METABOLIC PNL TOTAL CA: CPT

## 2025-01-20 PROCEDURE — 36415 COLL VENOUS BLD VENIPUNCTURE: CPT

## 2025-01-20 PROCEDURE — 6370000000 HC RX 637 (ALT 250 FOR IP): Performed by: INTERNAL MEDICINE

## 2025-01-20 PROCEDURE — 2500000003 HC RX 250 WO HCPCS: Performed by: STUDENT IN AN ORGANIZED HEALTH CARE EDUCATION/TRAINING PROGRAM

## 2025-01-20 RX ORDER — ATENOLOL 25 MG/1
12.5 TABLET ORAL DAILY
Qty: 30 TABLET | Refills: 3 | Status: SHIPPED | OUTPATIENT
Start: 2025-01-21 | End: 2025-01-20

## 2025-01-20 RX ORDER — POTASSIUM CHLORIDE 1500 MG/1
40 TABLET, EXTENDED RELEASE ORAL ONCE
Status: COMPLETED | OUTPATIENT
Start: 2025-01-20 | End: 2025-01-20

## 2025-01-20 RX ORDER — ATENOLOL 25 MG/1
12.5 TABLET ORAL DAILY
Qty: 30 TABLET | Refills: 3 | Status: SHIPPED | OUTPATIENT
Start: 2025-01-21

## 2025-01-20 RX ADMIN — SODIUM CHLORIDE, PRESERVATIVE FREE 10 ML: 5 INJECTION INTRAVENOUS at 10:03

## 2025-01-20 RX ADMIN — ASPIRIN 81 MG CHEWABLE TABLET 81 MG: 81 TABLET CHEWABLE at 10:01

## 2025-01-20 RX ADMIN — APIXABAN 2.5 MG: 2.5 TABLET, FILM COATED ORAL at 10:01

## 2025-01-20 RX ADMIN — POTASSIUM CHLORIDE 40 MEQ: 1500 TABLET, EXTENDED RELEASE ORAL at 10:01

## 2025-01-20 RX ADMIN — ALLOPURINOL 50 MG: 100 TABLET ORAL at 10:01

## 2025-01-20 RX ADMIN — ATENOLOL 12.5 MG: 25 TABLET ORAL at 10:01

## 2025-01-20 RX ADMIN — FOLIC ACID 1 MG: 1 TABLET ORAL at 10:02

## 2025-01-20 NOTE — PROGRESS NOTES
[place] [address]  Phone: [phone]    Fax [phone]                  Al Bravo MD, [facility]       Yeison Puckett MD, [facility]  Lavern Avelar MD, [facility]    MD Yuliet Galloway MD Tariq Rizvi, MD Bilal Alam, MD Dr. Waseem Sajjad MD Melissa Kellis, NILAY [place], APRMALLORY Conway, APRMALLORY Sarmiento, APRN  Ramon Monroy PA-C    CARDIOLOGY  NOTE      Name:  Virgie Segura /Age/Sex: 1945  (79 y.o. female)   MRN & CSN:  [id number] & [id number] Admission Date/Time: 2025 11:18 AM   Location:  -A PCP: No primary care provider on file.       Hospital Day: 2    - Cardiology consult is for:  Afib        CARDIOLOGY ATTENDING ADDENDUM    I have seen, spoken to and examined this patient personally, independent of the NP/PAC. I have reviewed the hospital care given to date and reviewed all pertinent labs and imaging. I have spoken with patient, nursing staff and provided written and verbal instructions .The above note has been reviewed. I have spent substantive (>50%) amount of time in formulating patient care.            Physical Exam:       Head: normal  Eye: normal  Chest:   Clear,  0 Basilar crackles   Cardiovascular:  S1S2          MEDICAL DECISION MAKING :           New onset atrial fibrillation  Converted to sinus rhythm now sinus rhythm with occasional PACs noted  Essential hypertension  Prior history of palpitations on digoxin ?  Which was discontinued in [place]  Chronic kidney failure  Elevated troponin in absence of EKG changes or chest pain, demand ischemia     EKG from [facility] was reviewed personally, confirmed atrial fibrillation, rate controlled.  Now sinus rhythm    Echo per below.  Preserved EF, no wall motion normalities    [id number] of 3: Discussed pros and cons of anticoagulation will start patient on Eliquis 2.5 mg p.o.

## 2025-01-21 ENCOUNTER — TELEPHONE (OUTPATIENT)
Dept: CARDIOLOGY CLINIC | Age: 80
End: 2025-01-21

## 2025-01-21 LAB
EKG ATRIAL RATE: 75 BPM
EKG DIAGNOSIS: NORMAL
EKG P AXIS: 38 DEGREES
EKG P-R INTERVAL: 136 MS
EKG Q-T INTERVAL: 402 MS
EKG QRS DURATION: 66 MS
EKG QTC CALCULATION (BAZETT): 448 MS
EKG R AXIS: -4 DEGREES
EKG T AXIS: -1 DEGREES
EKG VENTRICULAR RATE: 75 BPM

## 2025-01-21 PROCEDURE — 93010 ELECTROCARDIOGRAM REPORT: CPT | Performed by: INTERNAL MEDICINE

## 2025-01-21 NOTE — TELEPHONE ENCOUNTER
Spoke with patient went over medications with her.  She is refusing at hospital f/u at this time . It is \" too cold and she is too tired\" she will call the office to schedule

## 2025-01-21 NOTE — PROGRESS NOTES
Physician Progress Note      PATIENT:               DARLENE PERRY  CSN #:                  [id number]  :                       1945  ADMIT DATE:       2025 11:18 AM  DISCH DATE:        2025 4:58 PM  RESPONDING  PROVIDER #:        RAJAN QUIÑONEZ          QUERY TEXT:    Patient admitted with atrial fibrillation and noted will be maintained on   Eliquis for stroke prevention. If possible, please document in progress notes   and discharge summary if you are evaluating and/or treating any of the   following:?  ?  The medical record reflects the following:  Risk Factors: 79 yr old female, HTN, CKD    Clinical Indicators: Per cardiology note -\"[id number] of 3...will start   patient on Eliquis\"  Echo - slight prolapse of mitral valve with mild regurgitation    Treatment: Eliquis, cardiology consult, labs, EKG/Telemetry  Options provided:  -- Secondary hypercoagulable state in a patient with atrial fibrillation  -- Other - I will add my own diagnosis  -- Disagree - Not applicable / Not valid  -- Disagree - Clinically unable to determine / Unknown  -- Refer to Clinical Documentation Reviewer    PROVIDER RESPONSE TEXT:    This patient has secondary hypercoagulable state in a patient with atrial   fibrillation.    Query created by: Mary Ann Jordan on 2025 4:45 PM      Electronically signed by:  RAJAN QUIÑONEZ 2025 4:00 PM

## 2025-01-21 NOTE — TELEPHONE ENCOUNTER
Patient just discharged [facility] and saw Dr Schwartz  She wants to switch to [place] , she has a question  About her atenolol dose she feels she took   It incorrectly today , was also advised to get a   Stress test on her discharge paper work   Paul to know which stress test is needed .

## 2025-01-24 ENCOUNTER — TELEPHONE (OUTPATIENT)
Dept: CARDIOLOGY CLINIC | Age: 80
End: 2025-01-24

## 2025-01-24 NOTE — TELEPHONE ENCOUNTER
Pt had to call the Squad to go to [facility] ER on Sunday for Afib.Pt will get up between 745am and 830am and eat her breakfast and by 10am she is laying back down taking a nap. She goes to bed close to 11pm. She feels really tire around 10am and took about an hour and half nap. Hospital decreased Atenolol 12.5mg and put her on Eliquis two times daily. She would like to know if this is normal.

## 2025-01-24 NOTE — TELEPHONE ENCOUNTER
Patient was Dr Schwartz in [facility], very impressed so want to change from Dr Bravo to Dr Schwartz.  Patient states she is suppose to have nuc med. Will message Dr Schwartz for order

## 2025-02-03 PROBLEM — E87.6 HYPOKALEMIA: Status: ACTIVE | Noted: 2025-02-03

## 2025-02-04 ENCOUNTER — OFFICE VISIT (OUTPATIENT)
Dept: CARDIOLOGY CLINIC | Age: 80
End: 2025-02-04

## 2025-02-04 ENCOUNTER — TELEPHONE (OUTPATIENT)
Dept: CARDIOLOGY CLINIC | Age: 80
End: 2025-02-04

## 2025-02-04 VITALS
DIASTOLIC BLOOD PRESSURE: 62 MMHG | WEIGHT: 128 LBS | SYSTOLIC BLOOD PRESSURE: 114 MMHG | BODY MASS INDEX: 24.17 KG/M2 | HEART RATE: 66 BPM | HEIGHT: 61 IN

## 2025-02-04 DIAGNOSIS — R42 DIZZINESS AND GIDDINESS: ICD-10-CM

## 2025-02-04 DIAGNOSIS — I48.0 PAROXYSMAL ATRIAL FIBRILLATION (HCC): Primary | ICD-10-CM

## 2025-02-04 DIAGNOSIS — I38 VALVULAR HEART DISEASE: ICD-10-CM

## 2025-02-04 DIAGNOSIS — I10 ESSENTIAL HYPERTENSION: ICD-10-CM

## 2025-02-04 RX ORDER — DABIGATRAN ETEXILATE 150 MG/1
150 CAPSULE ORAL 2 TIMES DAILY
Qty: 180 CAPSULE | Refills: 1 | Status: SHIPPED | OUTPATIENT
Start: 2025-02-04 | End: 2025-02-05 | Stop reason: SDUPTHER

## 2025-02-04 RX ORDER — DABIGATRAN ETEXILATE 150 MG/1
150 CAPSULE ORAL 2 TIMES DAILY
Qty: 180 CAPSULE | Refills: 1 | Status: SHIPPED | OUTPATIENT
Start: 2025-02-04

## 2025-02-04 NOTE — TELEPHONE ENCOUNTER
Pt calling about new medication pradaxa that dr. Schwartz started her on today. [facility] said that [employer] espinozat cover this medication. Can you do a PA for this medication?    Pt currently on eliquis with 11 days left.     Call and advise.

## 2025-02-04 NOTE — PROGRESS NOTES
PVC's-Dr. Bravo    Right inguinal pain     referred from Dr. Terry to Dr. Hines then PT ( no hernia)    Shortness of breath on exertion     Skin cancer of nose In Past    Dr. Can,  previously see Dr. Talamantes    Teeth missing     Upper And Lower    Thigh pain     UTI (urinary tract infection) 03/2018    Vitamin D deficiency     Wears glasses     Wears partial dentures     Upper     Past Surgical History:   Procedure Laterality Date    BLADDER SURGERY  2000, 2008    Dr. CHOWDHURY- dilatation     BREAST BIOPSY Left 1993 And 1995    Benign    COLONOSCOPY  2011    Dr. Gibbs [facility] in [place];Grade 1 internal hemorrhoids    DENTAL SURGERY      Teeth Extracted In Past    ENDOSCOPY, COLON, DIAGNOSTIC  In Past    EYE SURGERY Left 11/2017    Cataract With Lens Implant    HYSTERECTOMY, TOTAL ABDOMINAL (CERVIX REMOVED)  1/14    \"took everything    SHOULDER ARTHROSCOPY Right 11/2017    rotator cuff repair, SAd, distal clavicle excision, extensive debridement    SKIN CANCER EXCISION  In Past    Nose    TONSILLECTOMY  1960's     Family History   Problem Relation Age of Onset    Arthritis Mother     Hearing Loss Mother     Heart Disease Mother         \"Aneurysm In Heart\"    Stroke Father     Heart Attack Father     Heart Disease Father         Heart Attack    Diabetes Father     Other Brother         \"Colon Polyps\"    Heart Disease Brother         \"Heart Surgery\"    Mental Illness Son     Breast Cancer Maternal Grandmother     Colon Cancer Maternal Uncle     Breast Cancer Maternal Aunt      Social History     Tobacco Use    Smoking status: Never    Smokeless tobacco: Never   Substance Use Topics    Alcohol use: No     Comment: caffeine: denies        Review of Systems:     Constitutional:  No Fever or Weight Loss   Eyes: No Decreased Vision  ENT: No Headaches, Hearing Loss or Vertigo  Cardiovascular: No Chest Pain,  No Shortness of breath, No Palpitations. No Edema   Respiratory: No cough or wheezing . No Respiratory

## 2025-02-05 ENCOUNTER — TELEPHONE (OUTPATIENT)
Dept: CARDIOLOGY CLINIC | Age: 80
End: 2025-02-05

## 2025-02-05 DIAGNOSIS — I38 VALVULAR HEART DISEASE: ICD-10-CM

## 2025-02-05 DIAGNOSIS — R42 DIZZINESS AND GIDDINESS: ICD-10-CM

## 2025-02-05 DIAGNOSIS — I48.0 PAROXYSMAL ATRIAL FIBRILLATION (HCC): ICD-10-CM

## 2025-02-05 DIAGNOSIS — I10 ESSENTIAL HYPERTENSION: ICD-10-CM

## 2025-02-05 RX ORDER — DABIGATRAN ETEXILATE 150 MG/1
150 CAPSULE ORAL 2 TIMES DAILY
Qty: 180 CAPSULE | Refills: 3 | Status: SHIPPED | OUTPATIENT
Start: 2025-02-05

## 2025-02-05 NOTE — TELEPHONE ENCOUNTER
Patient called back, she is contacting [facility] to see if it is cheaper to go through a local pharmacy or mail order pharmacy for the RX. Patient will call back to advise of further needs.

## 2025-02-05 NOTE — TELEPHONE ENCOUNTER
Approved today by [employer] NCPDP 2017  Your request has been approved  Authorization Expiration Date: 2/5/2026

## 2025-02-05 NOTE — TELEPHONE ENCOUNTER
Left message on voicemail for patient to return my call. Spoke with [facility] who advised that they just spoke with the patient and they are getting the RX ready for her to .

## 2025-02-05 NOTE — TELEPHONE ENCOUNTER
Spoke with [facility]. Patient has a $275 deductible that must be met prior to monthly cost of $75 per month for a 30 day supply and $187.50 for a 90 day supply of Pradaxa. Patient has agreed and would like RX sent to [facility] Mail order pharmacy. RX sent as requested. Patient advised to call back with any further questions.

## 2025-02-11 ENCOUNTER — TELEPHONE (OUTPATIENT)
Dept: CARDIOLOGY CLINIC | Age: 80
End: 2025-02-11

## 2025-02-11 NOTE — TELEPHONE ENCOUNTER
Patient has a question about her Pradaxa  Dose it was changed to 150 mg she is asking if  That's a lot .

## 2025-02-17 ENCOUNTER — TELEPHONE (OUTPATIENT)
Dept: CARDIOLOGY CLINIC | Age: 80
End: 2025-02-17

## 2025-02-17 NOTE — TELEPHONE ENCOUNTER
Spoke with patient she stated she does start Pradaxa 2/18/2025.  She stated her Verigo is \"acting up\" and sees a specialist at the end of the month.  If symptoms do not get better after starting new medication tomorrow she will give the office a call back.

## 2025-02-19 ENCOUNTER — TELEPHONE (OUTPATIENT)
Dept: CARDIOLOGY CLINIC | Age: 80
End: 2025-02-19

## 2025-02-19 NOTE — TELEPHONE ENCOUNTER
Pt is on dabigatran and she has notice she is very weak and tired slept most of the day and could it be this medication? She also has nausea going on.  Pt wanted me to let someone know she is not happy with anyone not explaining things to her.

## 2025-02-21 NOTE — TELEPHONE ENCOUNTER
Called patient, she stated she is feeling better. But still a little tired. Has been on Pradaxa for 3 days and feels it is a better medication then Eliquis

## 2025-02-24 ENCOUNTER — TELEPHONE (OUTPATIENT)
Dept: CARDIOLOGY CLINIC | Age: 80
End: 2025-02-24

## 2025-02-24 NOTE — TELEPHONE ENCOUNTER
Patient called she is removing the monitor   She is allergic to  the adhesive she called   The company and was advised to remove it   They are sending new round patches   To put it back on .

## 2025-02-24 NOTE — TELEPHONE ENCOUNTER
Patient called to discuss her medications  She is feeling so run down all she wants to   Do is sleep .

## 2025-02-25 ENCOUNTER — TELEPHONE (OUTPATIENT)
Dept: CARDIOLOGY CLINIC | Age: 80
End: 2025-02-25

## 2025-02-25 NOTE — TELEPHONE ENCOUNTER
Called patient she is scheduled for 2/27/25 at the [place] office. Wants to discuss medications with provider

## 2025-02-25 NOTE — TELEPHONE ENCOUNTER
----- Message from NILAY Carter CNP sent at 2/22/2025  3:17 PM EST -----  Regarding: problem with medications  Patient very considered about side effects of anticoagulation. General fatigue, anxiety. Needs appt to talk about medication side effect.    Thank you     De

## 2025-02-27 ENCOUNTER — OFFICE VISIT (OUTPATIENT)
Dept: CARDIOLOGY CLINIC | Age: 80
End: 2025-02-27
Payer: MEDICARE

## 2025-02-27 VITALS
DIASTOLIC BLOOD PRESSURE: 70 MMHG | HEART RATE: 76 BPM | HEIGHT: 61 IN | WEIGHT: 128 LBS | BODY MASS INDEX: 24.17 KG/M2 | SYSTOLIC BLOOD PRESSURE: 112 MMHG

## 2025-02-27 DIAGNOSIS — I34.0 NONRHEUMATIC MITRAL VALVE REGURGITATION: ICD-10-CM

## 2025-02-27 DIAGNOSIS — I48.0 PAROXYSMAL ATRIAL FIBRILLATION (HCC): Primary | ICD-10-CM

## 2025-02-27 DIAGNOSIS — R00.2 PALPITATIONS: ICD-10-CM

## 2025-02-27 DIAGNOSIS — R60.0 EDEMA OF LOWER EXTREMITY: ICD-10-CM

## 2025-02-27 DIAGNOSIS — I10 ESSENTIAL HYPERTENSION: ICD-10-CM

## 2025-02-27 DIAGNOSIS — I38 VALVULAR HEART DISEASE: ICD-10-CM

## 2025-02-27 PROCEDURE — 99214 OFFICE O/P EST MOD 30 MIN: CPT | Performed by: INTERNAL MEDICINE

## 2025-02-27 PROCEDURE — 3074F SYST BP LT 130 MM HG: CPT | Performed by: INTERNAL MEDICINE

## 2025-02-27 PROCEDURE — 1123F ACP DISCUSS/DSCN MKR DOCD: CPT | Performed by: INTERNAL MEDICINE

## 2025-02-27 PROCEDURE — 3078F DIAST BP <80 MM HG: CPT | Performed by: INTERNAL MEDICINE

## 2025-02-27 PROCEDURE — 1159F MED LIST DOCD IN RCRD: CPT | Performed by: INTERNAL MEDICINE

## 2025-02-27 RX ORDER — LUBIPROSTONE 8 UG/1
8 CAPSULE ORAL DAILY
COMMUNITY

## 2025-02-27 RX ORDER — DABIGATRAN ETEXILATE 75 MG/1
75 CAPSULE ORAL 2 TIMES DAILY
Qty: 180 CAPSULE | Refills: 3 | Status: SHIPPED | OUTPATIENT
Start: 2025-02-27 | End: 2026-02-22

## 2025-02-27 NOTE — PROGRESS NOTES
Dylan Schwartz MD                                  CARDIOLOGY  NOTE            Chief Complaint:    Chief Complaint   Patient presents with    Follow-up     Discuss dabigatran etexilate medication.         Prior HPI:     Virgie is a 79 y.o. year old female who recently presented to the hospital with tachycardia and was noted to have new onset atrial fibrillation.  Patient spontaneously converted to sinus rhythm.  Patient had previously followed up with Dr. Elliott and moved to [place], she has now relocated back to [place].    During recent hospitalization in January 2025, patient had an echocardiogram performed which showed preserved LV ejection fraction, mild MR with anteriorly leaflet thickened and prolapsed     During recent hospitalization patient was started on oral anticoagulation with Eliquis 2.5 mg p.o. twice daily based on age and weight with serum creatinine over 1.4    Patient offered follow-up and complains of dizziness      Current Outpatient Medications   Medication Sig Dispense Refill    lubiprostone (AMITIZA) 8 MCG CAPS capsule Take 1 capsule by mouth daily      dabigatran (PRADAXA) 75 MG capsule Take 1 capsule by mouth 2 times daily 180 capsule 3    NONFORMULARY LUBPRISTONE 8MG TAKES ONCE DAILY AFTER BREAKFAST      potassium chloride (KLOR-CON M) 10 MEQ extended release tablet Take 2 tablets by mouth daily 180 tablet 3    furosemide (LASIX) 40 MG tablet TAKE ONE (1) TABLET BY MOUTH AS DIRECTED ON MON-WED-FRI 90 tablet 3    atenolol (TENORMIN) 25 MG tablet Take 0.5 tablets by mouth daily 30 tablet 3    ALPRAZolam (XANAX) 0.5 MG tablet Take 2 tablets by mouth 3 times daily as needed for Anxiety for up to 180 days. Max Daily Amount: 3 mg (Patient taking differently: Take 2 tablets by mouth nightly as needed for Sleep. 0.25mg prn no more than 1.5mg daily) 180 tablet 2    DULoxetine (CYMBALTA) 60 MG extended release capsule Take 1 capsule by mouth daily 30 capsule 11    nortriptyline

## 2025-03-17 ENCOUNTER — TELEPHONE (OUTPATIENT)
Dept: CARDIOLOGY CLINIC | Age: 80
End: 2025-03-17

## 2025-03-17 NOTE — TELEPHONE ENCOUNTER
Pt states since beginning the Terconazole she has had a throbbing sensation and continued itching. Please advise. Patient/Caregiver provided printed discharge information.

## 2025-03-17 NOTE — TELEPHONE ENCOUNTER
Pt called- DOS 2/18/25 she thought she was billed twice- this was only for a carotid test.  She understood and was satisfied-all questions answered.

## 2025-04-17 ENCOUNTER — TELEPHONE (OUTPATIENT)
Dept: CARDIOLOGY CLINIC | Age: 80
End: 2025-04-17

## 2025-04-17 NOTE — TELEPHONE ENCOUNTER
Pt said at 6:30 am she called 911 because she was having her heart beating fast and hard, they took her to [facility] er they got her heart back in rhythm with medication and they advised her tell her cardiologist.

## 2025-04-21 ENCOUNTER — TELEPHONE (OUTPATIENT)
Dept: CARDIOLOGY CLINIC | Age: 80
End: 2025-04-21

## 2025-04-21 NOTE — TELEPHONE ENCOUNTER
Spoke with patient.  She went to [place] ED on 4/17/25 for A-Fib. She is still feeling tired with her heart racing.  F/u is on 5/1/25. She is wanting to know if any medications need to be changed   Current VS  147/86 HR 73

## 2025-04-21 NOTE — TELEPHONE ENCOUNTER
Patient called she feels that she really   Needs seen post ed visit , she is feeling   Very weak , has appt 5/1 that's to far out   Asking if she can see any one ?

## 2025-04-22 NOTE — PROGRESS NOTES
Other Type of Procedure:   Cardiac Clearance  ([facility])   Needs Further Workup Not Cleared Cleared ([facility] below)     Low Risk     Moderate Risk     High Risk   Additional Notes:   Consult/ Referral   CT Surgery Electrophysiology TAVR Coordination ([facility]) Heart Failure [facility] Venous Ablation Consult Watchman Consult  ([facility])        Rosalina De   Additional Notes:   Labs Date Last Completed Date Expected Retrieve from outside source?    BMP 1/2025 5/2025 - [facility]     BNP 1/2025      CBC 1/2025 5/2025 - [facility]        INR       Lipid 9/2024      TSH 1/2025      A1C 9/2024      Other:      Additional Notes:   Medication/Samples Type of Medication Additional Notes    Samples of      Paper script      Research Study     Other:   Next Office Visit  ([facility])   1 month 3 months 6 months 9 months 1 year PRN 1 week 2 weeks   Additional Notes:

## 2025-04-24 ENCOUNTER — OFFICE VISIT (OUTPATIENT)
Dept: CARDIOLOGY CLINIC | Age: 80
End: 2025-04-24
Payer: MEDICARE

## 2025-04-24 VITALS
HEIGHT: 61 IN | HEART RATE: 69 BPM | BODY MASS INDEX: 24.66 KG/M2 | WEIGHT: 130.6 LBS | DIASTOLIC BLOOD PRESSURE: 56 MMHG | SYSTOLIC BLOOD PRESSURE: 98 MMHG

## 2025-04-24 DIAGNOSIS — I34.1 MITRAL VALVE PROLAPSE: ICD-10-CM

## 2025-04-24 DIAGNOSIS — I48.0 PAROXYSMAL ATRIAL FIBRILLATION (HCC): ICD-10-CM

## 2025-04-24 DIAGNOSIS — I10 ESSENTIAL HYPERTENSION: Primary | ICD-10-CM

## 2025-04-24 DIAGNOSIS — E78.2 MIXED HYPERLIPIDEMIA: ICD-10-CM

## 2025-04-24 PROCEDURE — 3074F SYST BP LT 130 MM HG: CPT | Performed by: NURSE PRACTITIONER

## 2025-04-24 PROCEDURE — 3078F DIAST BP <80 MM HG: CPT | Performed by: NURSE PRACTITIONER

## 2025-04-24 PROCEDURE — 1159F MED LIST DOCD IN RCRD: CPT | Performed by: NURSE PRACTITIONER

## 2025-04-24 PROCEDURE — 99214 OFFICE O/P EST MOD 30 MIN: CPT | Performed by: NURSE PRACTITIONER

## 2025-04-24 PROCEDURE — 93000 ELECTROCARDIOGRAM COMPLETE: CPT | Performed by: NURSE PRACTITIONER

## 2025-04-24 PROCEDURE — 1123F ACP DISCUSS/DSCN MKR DOCD: CPT | Performed by: NURSE PRACTITIONER

## 2025-04-24 NOTE — PROGRESS NOTES
CLINICAL STAFF DOCUMENTATION         Virgie COLEMAN Colton  1945  [id number]    Have you had any Chest Pain recently? - No          Have you had any Shortness of Breath - No      Have you had any dizziness - No      Have you had any palpitations recently? - No      Do you have any edema - swelling in both legs , ankles and feet.           When did you have your last labs drawn 04/17/2025  [facility] - CARE EVERYWHERE   Do we have the labs in their chart Yes          Do you need any prescriptions refilled? - No    Do you have a surgery or procedure scheduled in the near future - No        Caffeine? - Yes  How much caffeine? . Small soda       
capsule 11    gabapentin (NEURONTIN) 300 MG capsule Take 1 capsule by mouth nightly. 90 capsule 3    ESTRADIOL [facility] 0.2 g vaginally 2x weekly      clotrimazole (LOTRIMIN) 1 % cream PLEASE SEE ATTACHED FOR DETAILED DIRECTIONS      desonide (DESOWEN) 0.05 % ointment       XIIDRA 5 % SOLN Starting when she is done with the restasis bottle.      allopurinol (ZYLOPRIM) 100 MG tablet Take 0.5 tablets by mouth daily      clobetasol (TEMOVATE) 0.05 % ointment Apply topically nightly for one week then use two t0 three times weekly (do not use the days using vaginal estrogen) 30 g 4    Multiple Vitamins-Minerals (MULTIVITAMIN ADULTS 50+ PO)       vitamin D (CHOLECALCIFEROL) 125 MCG (5000 UT) CAPS capsule Take 1 capsule by mouth daily      Probiotic Product (PROBIOTIC-10 PO) Take 1 tablet by mouth daily      folic acid (FOLVITE) 1 MG tablet Take 1 tablet by mouth daily      aspirin 81 MG EC tablet Take 1 tablet by mouth every morning Over The Counter      NONFORMULARY LUBPRISTONE 8MG TAKES ONCE DAILY AFTER BREAKFAST (Patient not taking: Reported on 4/24/2025)       No current facility-administered medications for this visit.       Review of Systems:  Review of Systems   Respiratory:  Negative for shortness of breath.    Cardiovascular:  Positive for palpitations. Negative for chest pain and leg swelling.   Musculoskeletal: Negative.    Skin: Negative.    Neurological:  Negative for dizziness and weakness.   All other systems reviewed and are negative.         Objective:        Physical exam:  Physical Exam  Constitutional:       Appearance: She is well-developed.   Cardiovascular:      Rate and Rhythm: Normal rate and regular rhythm.      Pulses: Intact distal pulses.           Dorsalis pedis pulses are 2+ on the right side and 2+ on the left side.        Posterior tibial pulses are 2+ on the right side and 2+ on the left side.      Heart sounds: Normal heart sounds, S1 normal and S2 normal.   Pulmonary:      Effort:

## 2025-04-28 ENCOUNTER — TELEPHONE (OUTPATIENT)
Dept: CARDIOLOGY CLINIC | Age: 80
End: 2025-04-28

## 2025-04-28 NOTE — TELEPHONE ENCOUNTER
EP-     Patient called and said she has an appointment on 05/07 but if you were to have any cancellations she would like to get in faster.

## 2025-04-30 PROBLEM — I34.1 MITRAL VALVE PROLAPSE: Status: ACTIVE | Noted: 2025-04-30

## 2025-05-02 ENCOUNTER — HOSPITAL ENCOUNTER (EMERGENCY)
Age: 80
Discharge: HOME | End: 2025-05-02
Attending: STUDENT IN AN ORGANIZED HEALTH CARE EDUCATION/TRAINING PROGRAM
Payer: MEDICARE

## 2025-05-02 ENCOUNTER — APPOINTMENT (OUTPATIENT)
Dept: CT IMAGING | Age: 80
End: 2025-05-02
Payer: MEDICARE

## 2025-05-02 ENCOUNTER — APPOINTMENT (OUTPATIENT)
Dept: GENERAL RADIOLOGY | Age: 80
End: 2025-05-02
Payer: MEDICARE

## 2025-05-02 VITALS
SYSTOLIC BLOOD PRESSURE: 144 MMHG | OXYGEN SATURATION: 100 % | RESPIRATION RATE: 14 BRPM | TEMPERATURE: 98.1 F | DIASTOLIC BLOOD PRESSURE: 77 MMHG | HEART RATE: 66 BPM

## 2025-05-02 DIAGNOSIS — R53.83 OTHER FATIGUE: Primary | ICD-10-CM

## 2025-05-02 DIAGNOSIS — R03.0 ELEVATED BLOOD PRESSURE READING: ICD-10-CM

## 2025-05-02 DIAGNOSIS — N18.32 STAGE 3B CHRONIC KIDNEY DISEASE (HCC): ICD-10-CM

## 2025-05-02 LAB
ALBUMIN SERPL-MCNC: 4 G/DL (ref 3.4–5)
ALBUMIN/GLOB SERPL: 2 {RATIO} (ref 1.1–2.2)
ALP SERPL-CCNC: 67 U/L (ref 40–129)
ALT SERPL-CCNC: 19 U/L (ref 10–40)
ANION GAP SERPL CALCULATED.3IONS-SCNC: 12 MMOL/L (ref 9–17)
AST SERPL-CCNC: 35 U/L (ref 15–37)
BASOPHILS # BLD: 0.05 K/UL
BASOPHILS NFR BLD: 1 % (ref 0–1)
BILIRUB DIRECT SERPL-MCNC: <0.2 MG/DL (ref 0–0.3)
BILIRUB INDIRECT SERPL-MCNC: ABNORMAL MG/DL (ref 0–0.7)
BILIRUB SERPL-MCNC: 0.3 MG/DL (ref 0–1)
BUN SERPL-MCNC: 25 MG/DL (ref 7–20)
CALCIUM SERPL-MCNC: 9.4 MG/DL (ref 8.3–10.6)
CHLORIDE SERPL-SCNC: 100 MMOL/L (ref 99–110)
CO2 SERPL-SCNC: 29 MMOL/L (ref 21–32)
CREAT SERPL-MCNC: 1.5 MG/DL (ref 0.6–1.2)
EOSINOPHIL # BLD: 0 K/UL
EOSINOPHILS RELATIVE PERCENT: 0 % (ref 0–3)
ERYTHROCYTE [DISTWIDTH] IN BLOOD BY AUTOMATED COUNT: 14.3 % (ref 11.7–14.9)
GFR, ESTIMATED: 35 ML/MIN/1.73M2
GLUCOSE SERPL-MCNC: 93 MG/DL (ref 74–99)
HCT VFR BLD AUTO: 40 % (ref 37–47)
HGB BLD-MCNC: 12.4 G/DL (ref 12.5–16)
IMM GRANULOCYTES # BLD AUTO: 0.03 K/UL
IMM GRANULOCYTES NFR BLD: 0 %
LYMPHOCYTES NFR BLD: 1.63 K/UL
LYMPHOCYTES RELATIVE PERCENT: 20 % (ref 24–44)
MAGNESIUM SERPL-MCNC: 2.5 MG/DL (ref 1.8–2.4)
MCH RBC QN AUTO: 28.6 PG (ref 27–31)
MCHC RBC AUTO-ENTMCNC: 31 G/DL (ref 32–36)
MCV RBC AUTO: 92.2 FL (ref 78–100)
MONOCYTES NFR BLD: 0.54 K/UL
MONOCYTES NFR BLD: 7 % (ref 0–5)
NEUTROPHILS NFR BLD: 73 % (ref 36–66)
NEUTS SEG NFR BLD: 6.12 K/UL
PHOSPHATE SERPL-MCNC: 4 MG/DL (ref 2.5–4.9)
PLATELET # BLD AUTO: 188 K/UL (ref 140–440)
PMV BLD AUTO: 10.9 FL (ref 7.5–11.1)
POTASSIUM SERPL-SCNC: 5 MMOL/L (ref 3.5–5.1)
PROT SERPL-MCNC: 6 G/DL (ref 6.4–8.2)
RBC # BLD AUTO: 4.34 M/UL (ref 4.2–5.4)
SODIUM SERPL-SCNC: 141 MMOL/L (ref 136–145)
TROPONIN I SERPL HS-MCNC: 11 NG/L (ref 0–14)
TROPONIN I SERPL HS-MCNC: 11 NG/L (ref 0–14)
WBC OTHER # BLD: 8.4 K/UL (ref 4–10.5)

## 2025-05-02 PROCEDURE — 36415 COLL VENOUS BLD VENIPUNCTURE: CPT

## 2025-05-02 PROCEDURE — 99285 EMERGENCY DEPT VISIT HI MDM: CPT

## 2025-05-02 PROCEDURE — 84484 ASSAY OF TROPONIN QUANT: CPT

## 2025-05-02 PROCEDURE — 82248 BILIRUBIN DIRECT: CPT

## 2025-05-02 PROCEDURE — 84100 ASSAY OF PHOSPHORUS: CPT

## 2025-05-02 PROCEDURE — 85025 COMPLETE CBC W/AUTO DIFF WBC: CPT

## 2025-05-02 PROCEDURE — 80053 COMPREHEN METABOLIC PANEL: CPT

## 2025-05-02 PROCEDURE — 83735 ASSAY OF MAGNESIUM: CPT

## 2025-05-02 PROCEDURE — 93005 ELECTROCARDIOGRAM TRACING: CPT | Performed by: STUDENT IN AN ORGANIZED HEALTH CARE EDUCATION/TRAINING PROGRAM

## 2025-05-02 PROCEDURE — 70450 CT HEAD/BRAIN W/O DYE: CPT

## 2025-05-02 PROCEDURE — 71045 X-RAY EXAM CHEST 1 VIEW: CPT

## 2025-05-02 ASSESSMENT — PAIN SCALES - GENERAL
PAINLEVEL_OUTOF10: 5
PAINLEVEL_OUTOF10: 8

## 2025-05-02 ASSESSMENT — PAIN - FUNCTIONAL ASSESSMENT
PAIN_FUNCTIONAL_ASSESSMENT: 0-10
PAIN_FUNCTIONAL_ASSESSMENT: 0-10

## 2025-05-02 ASSESSMENT — PAIN DESCRIPTION - LOCATION: LOCATION: GENERALIZED

## 2025-05-02 ASSESSMENT — PAIN DESCRIPTION - DESCRIPTORS: DESCRIPTORS: ACHING

## 2025-05-02 ASSESSMENT — LIFESTYLE VARIABLES
HOW MANY STANDARD DRINKS CONTAINING ALCOHOL DO YOU HAVE ON A TYPICAL DAY: PATIENT DOES NOT DRINK
HOW OFTEN DO YOU HAVE A DRINK CONTAINING ALCOHOL: NEVER

## 2025-05-02 NOTE — ED PROVIDER NOTES
Imer;Grade 1 internal hemorrhoids    DENTAL SURGERY      Teeth Extracted In Past    ENDOSCOPY, COLON, DIAGNOSTIC  In Past    EYE SURGERY Left 11/2017    Cataract With Lens Implant    HYSTERECTOMY, TOTAL ABDOMINAL (CERVIX REMOVED)  1/14    \"took everything    SHOULDER ARTHROSCOPY Right 11/2017    rotator cuff repair, SAd, distal clavicle excision, extensive debridement    SKIN CANCER EXCISION  In Past    Nose    TONSILLECTOMY  1960's     Family History   Problem Relation Age of Onset    Arthritis Mother     Hearing Loss Mother     Heart Disease Mother         \"Aneurysm In Heart\"    Stroke Father     Heart Attack Father     Heart Disease Father         Heart Attack    Diabetes Father     Other Brother         \"Colon Polyps\"    Heart Disease Brother         \"Heart Surgery\"    Mental Illness Son     Breast Cancer Maternal Grandmother     Colon Cancer Maternal Uncle     Breast Cancer Maternal Aunt      Social History     Socioeconomic History    Marital status:      Spouse name: Not on file    Number of children: Not on file    Years of education: Not on file    Highest education level: Not on file   Occupational History    Not on file   Tobacco Use    Smoking status: Never    Smokeless tobacco: Never   Vaping Use    Vaping status: Never Used   Substance and Sexual Activity    Alcohol use: No    Drug use: No    Sexual activity: Never   Other Topics Concern    Not on file   Social History Narrative        Exercise - none    Retired college counselor     Social Drivers of Health     Financial Resource Strain: Low Risk  (2/28/2022)    Overall Financial Resource Strain (CARDIA)     Difficulty of Paying Living Expenses: Not hard at all   Food Insecurity: No Food Insecurity (1/19/2025)    Hunger Vital Sign     Worried About Running Out of Food in the Last Year: Never true     Ran Out of Food in the Last Year: Never true   Transportation Needs: No Transportation Needs (1/19/2025)    PRAPARE - Transportation     Nerves: Cranial nerves 2-12 are intact.      Sensory: Sensation is intact.      Motor: Motor function is intact.      Coordination: Coordination is intact.      Deep Tendon Reflexes: Reflexes are normal and symmetric.         I have reviewed and interpreted all of the currently available lab results from this visit (if applicable):  Results for orders placed or performed during the hospital encounter of 05/02/25   BMP   Result Value Ref Range    Sodium 141 136 - 145 mmol/L    Potassium 5.0 3.5 - 5.1 mmol/L    Chloride 100 99 - 110 mmol/L    CO2 29 21 - 32 mmol/L    Anion Gap 12 9 - 17 mmol/L    Glucose 93 74 - 99 mg/dL    BUN 25 (H) 7 - 20 mg/dL    Creatinine 1.5 (H) 0.6 - 1.2 mg/dL    Est, Glom Filt Rate 35 (L) >60 mL/min/1.73m2    Calcium 9.4 8.3 - 10.6 mg/dL   CBC with Auto Differential   Result Value Ref Range    WBC 8.4 4.0 - 10.5 k/uL    RBC 4.34 4.20 - 5.40 m/uL    Hemoglobin 12.4 (L) 12.5 - 16.0 g/dL    Hematocrit 40.0 37.0 - 47.0 %    MCV 92.2 78.0 - 100.0 fL    MCH 28.6 27.0 - 31.0 pg    MCHC 31.0 (L) 32.0 - 36.0 g/dL    RDW 14.3 11.7 - 14.9 %    Platelets 188 140 - 440 k/uL    MPV 10.9 7.5 - 11.1 fL    Neutrophils % 73 (H) 36 - 66 %    Lymphocytes % 20 (L) 24 - 44 %    Monocytes % 7 (H) 0 - 5 %    Eosinophils % 0 0 - 3 %    Basophils % 1 0 - 1 %    Immature Granulocytes % 0 0 %    Neutrophils Absolute 6.12 k/uL    Lymphocytes Absolute 1.63 k/uL    Monocytes Absolute 0.54 k/uL    Eosinophils Absolute 0.00 k/uL    Basophils Absolute 0.05 k/uL    Immature Granulocytes Absolute 0.03 k/uL   Troponin Now and Q1Hr   Result Value Ref Range    Troponin, High Sensitivity 11 0 - 14 ng/L   Troponin Now and Q1Hr   Result Value Ref Range    Troponin, High Sensitivity 11 0 - 14 ng/L   Hepatic Function Panel   Result Value Ref Range    Albumin 4.0 3.4 - 5.0 g/dL    Alkaline Phosphatase 67 40 - 129 U/L    ALT 19 10 - 40 U/L    AST 35 15 - 37 U/L    Total Bilirubin 0.3 0.0 - 1.0 mg/dL    Bilirubin, Direct <0.2 0.0 - 0.3

## 2025-05-03 LAB
EKG ATRIAL RATE: 61 BPM
EKG DIAGNOSIS: NORMAL
EKG P AXIS: -12 DEGREES
EKG P-R INTERVAL: 126 MS
EKG Q-T INTERVAL: 406 MS
EKG QRS DURATION: 72 MS
EKG QTC CALCULATION (BAZETT): 408 MS
EKG R AXIS: 6 DEGREES
EKG T AXIS: 21 DEGREES
EKG VENTRICULAR RATE: 61 BPM

## 2025-05-03 PROCEDURE — 93010 ELECTROCARDIOGRAM REPORT: CPT | Performed by: INTERNAL MEDICINE

## 2025-05-05 ENCOUNTER — TELEPHONE (OUTPATIENT)
Dept: CARDIOLOGY CLINIC | Age: 80
End: 2025-05-05

## 2025-05-05 NOTE — TELEPHONE ENCOUNTER
Dr. Dylan Schwartz       [facility] calls:      MRN: [id number]       Chest Pain:    Are you experiencing new onset chest pain: No    What type of pain is it? -       How long:       PainScale:      Meds taken:      Dyspnea:    Are you experiencing new onset Dyspnea:      For how long:       Meds taken:        Palpitations:    Have you had any palpitations recently? - Yes    Do you feel your heart :racing      For how long - Day's this morning         High/Low BP:    Are you experiencing abnormal blood pressure:          What is BP:    How long:      Meds taken:        Syncope:    Are you experiencing syncope-blacking out/passing out/lightheadeness:No    For how long:            Bleeding from Groin/Incision site    Are you having bleeding from groin/incision site:      For how long:           Stroke Like Symptoms:    What symptoms are present:    For how long: this morning.  Patient is also complaining of pain in her legs.     Call transferred to Kym.

## 2025-05-07 ENCOUNTER — TELEPHONE (OUTPATIENT)
Dept: CARDIOLOGY CLINIC | Age: 80
End: 2025-05-07

## 2025-05-07 ENCOUNTER — INITIAL CONSULT (OUTPATIENT)
Age: 80
End: 2025-05-07
Payer: MEDICARE

## 2025-05-07 VITALS
HEART RATE: 71 BPM | DIASTOLIC BLOOD PRESSURE: 78 MMHG | SYSTOLIC BLOOD PRESSURE: 118 MMHG | HEIGHT: 62 IN | WEIGHT: 132.8 LBS | BODY MASS INDEX: 24.44 KG/M2

## 2025-05-07 DIAGNOSIS — R00.2 PALPITATIONS: ICD-10-CM

## 2025-05-07 DIAGNOSIS — I48.0 PAF (PAROXYSMAL ATRIAL FIBRILLATION) (HCC): Primary | ICD-10-CM

## 2025-05-07 PROCEDURE — 93000 ELECTROCARDIOGRAM COMPLETE: CPT | Performed by: INTERNAL MEDICINE

## 2025-05-07 PROCEDURE — 3078F DIAST BP <80 MM HG: CPT | Performed by: INTERNAL MEDICINE

## 2025-05-07 PROCEDURE — 3074F SYST BP LT 130 MM HG: CPT | Performed by: INTERNAL MEDICINE

## 2025-05-07 PROCEDURE — 99205 OFFICE O/P NEW HI 60 MIN: CPT | Performed by: INTERNAL MEDICINE

## 2025-05-07 PROCEDURE — 1123F ACP DISCUSS/DSCN MKR DOCD: CPT | Performed by: INTERNAL MEDICINE

## 2025-05-07 RX ORDER — AMIODARONE HYDROCHLORIDE 200 MG/1
200 TABLET ORAL DAILY
Qty: 60 TABLET | Refills: 2 | Status: SHIPPED | OUTPATIENT
Start: 2025-05-07

## 2025-05-07 RX ORDER — ALPRAZOLAM 1 MG/1
1 TABLET ORAL NIGHTLY PRN
COMMUNITY
Start: 2025-05-01

## 2025-05-07 RX ORDER — AMIODARONE HYDROCHLORIDE 200 MG/1
200 TABLET ORAL DAILY
Qty: 60 TABLET | Refills: 2 | Status: SHIPPED | OUTPATIENT
Start: 2025-05-07 | End: 2025-05-07 | Stop reason: SDUPTHER

## 2025-05-07 NOTE — CARE COORDINATION
Saw patient for Pre Watchman Education.   My role as Watchman Coordinator explained.   Watchman discussed, Brochure provided and Cleo video shared.   Nice Tool utilized and filled out.   Will scan into media tab shared decision making appointment.    Explained to the patient:  Part of the FDA approval of the Watchman procedure in 2015 mandated that each procedure must participate in a national registry, this requires several follow up appointments and testing following the procedure.      These expectations Include:      7-10 day follow up with the Nurse practitioner or Implanting Physician    45 day follow up lab work and CHARITO to check positioning of the device.      6 month follow up telephone call     1 year follow up appointment and lab work (CHARITO per Implanting physician discretion)    2  year follow up appointment and lab work .    Discussed the importance of blood thinners as prescribed and that the patient cannot come off those blood thinners until discontinued by the implanting physician.     Assessment/History of:    Nickel or metal allergy?  [] Yes     [x] No    Contrast allergy?  [] Yes     [x] No    Anesthesia issues?  [] Yes     [x] No    Difficulty swallowing?  [] Yes     [x] No    Do you have any procedures/surgeries planned that would interrupt Plavix and ASA for next 6 months?  [] Yes     [x] No    History of Sleep Apnea?  [] Yes     [x] No    Do you wear a CPAP?  [] Yes     [x] No      Modified Walthall Scale:    [x] 0: No symptoms at all  [] 1: No significant disability despite symptoms  [] 2: Slight disability   [] 3: Moderate disability  [] 4: Moderately severe disability  [] 5: Severe disability    [] Modified Walthall Not Administered      All questions and concerns answered.  Will schedule Concomitant procedure.  Kee SHORT notified to schedule.  Will follow    Sandra Villeda RN  Watchman Coordinator  Electronically signed by Sandra Villeda RN on 5/7/2025 at 2:35 PM Watchman Care

## 2025-05-07 NOTE — PROGRESS NOTES
Electrophysiology Consult Note      Reason for consultation:  Atrial fibrillation    Chief complaint : Atrial fibrillation    Referring physician:  / De Sarmiento APRN-CNP      Primary care physician: Elaine Cain MD      History of Present Illness:     History of Present Illness  The patient is an 79-year-old female with a history of paroxysmal atrial fibrillation (PAF), mitral valve prolapse, hypertension, and hyperlipidemia, referred by Dr. Lozano for evaluation of her A-fib management and Watchman device. She is accompanied by her brother.    She reports experiencing fatigue since early January 2025, which she attributes to her relocation in July 2023. Despite not engaging in heavy lifting, she acknowledges feeling worn out. Patient states she feels palpitations due to Afib which can happen whenever. She recalls two instances in January 2025 where she went to bed early due to fatigue but was awakened around 1:00 AM by a sensation of being thrown across her apartment. During these episodes, she did not experience any arrhythmia but felt faint. She managed these episodes by consuming decaffeinated tea, orange juice, oats, and raisins. On 04/17/2025, she was awakened at 5:45 AM by a sensation of her heart beating intensely, akin to a sledgehammer, accompanied by arrhythmia. She sought emergency care at [facility]. On 05/02/2025, while resting in her recliner, she experienced normal breathing but felt a constant sensation in her chest. This persisted throughout the day, leading her to seek medical attention at [facility]. She was discharged home later that night. A similar episode occurred on 05/05/2025. She has been on Pradaxa since January 2025 and has not experienced any falls since starting this medication. She expresses concern about the cost of Eliquis, which she finds unaffordable.    She also reports leg issues, which she

## 2025-05-08 ENCOUNTER — TELEPHONE (OUTPATIENT)
Dept: CARDIOLOGY CLINIC | Age: 80
End: 2025-05-08

## 2025-05-08 DIAGNOSIS — I48.0 PAF (PAROXYSMAL ATRIAL FIBRILLATION) (HCC): Primary | ICD-10-CM

## 2025-05-08 NOTE — TELEPHONE ENCOUNTER
Called patient and advised of date and times of procedure;    PPW 5/29/2025 @ 0933    A-fib/WM 6/2/2025 @ 0700    Also advised needs to start amiodarone when she picks it up from the pharmacy. Patient stated understanding.

## 2025-05-08 NOTE — TELEPHONE ENCOUNTER
Pt seen Dr. Romano yesterday 5/7. She thought Dr. Romano was calling in Amiodarone 200mg. She wants to know if she is suppose to start this medication now.

## 2025-05-09 ENCOUNTER — TELEPHONE (OUTPATIENT)
Dept: CARDIOLOGY CLINIC | Age: 80
End: 2025-05-09

## 2025-05-09 NOTE — TELEPHONE ENCOUNTER
Called patient and advised per Ros not to cut in half and try taking with food or to take at a different time during the day. Patient stated understanding.

## 2025-05-13 ENCOUNTER — OFFICE VISIT (OUTPATIENT)
Dept: CARDIOLOGY CLINIC | Age: 80
End: 2025-05-13
Payer: MEDICARE

## 2025-05-13 VITALS
SYSTOLIC BLOOD PRESSURE: 122 MMHG | WEIGHT: 132 LBS | HEIGHT: 62 IN | DIASTOLIC BLOOD PRESSURE: 80 MMHG | HEART RATE: 60 BPM | BODY MASS INDEX: 24.29 KG/M2 | OXYGEN SATURATION: 98 %

## 2025-05-13 DIAGNOSIS — I48.0 PAF (PAROXYSMAL ATRIAL FIBRILLATION) (HCC): Primary | ICD-10-CM

## 2025-05-13 DIAGNOSIS — N18.9 CHRONIC KIDNEY DISEASE-MINERAL AND BONE DISORDER: ICD-10-CM

## 2025-05-13 DIAGNOSIS — M89.9 CHRONIC KIDNEY DISEASE-MINERAL AND BONE DISORDER: ICD-10-CM

## 2025-05-13 DIAGNOSIS — E83.9 CHRONIC KIDNEY DISEASE-MINERAL AND BONE DISORDER: ICD-10-CM

## 2025-05-13 PROBLEM — N18.4 CHRONIC KIDNEY DISEASE, STAGE IV (SEVERE) (HCC): Status: RESOLVED | Noted: 2020-10-15 | Resolved: 2025-05-13

## 2025-05-13 PROBLEM — I48.91 ATRIAL FIBRILLATION (HCC): Status: RESOLVED | Noted: 2025-01-19 | Resolved: 2025-05-13

## 2025-05-13 PROBLEM — I48.91 ATRIAL FIBRILLATION WITH RVR (HCC): Status: RESOLVED | Noted: 2025-01-19 | Resolved: 2025-05-13

## 2025-05-13 PROBLEM — I34.1 MITRAL VALVE PROLAPSE: Status: RESOLVED | Noted: 2025-04-30 | Resolved: 2025-05-13

## 2025-05-13 PROBLEM — N18.32 STAGE 3B CHRONIC KIDNEY DISEASE (HCC): Status: RESOLVED | Noted: 2020-01-21 | Resolved: 2025-05-13

## 2025-05-13 PROCEDURE — 3079F DIAST BP 80-89 MM HG: CPT | Performed by: INTERNAL MEDICINE

## 2025-05-13 PROCEDURE — 99214 OFFICE O/P EST MOD 30 MIN: CPT | Performed by: INTERNAL MEDICINE

## 2025-05-13 PROCEDURE — 1159F MED LIST DOCD IN RCRD: CPT | Performed by: INTERNAL MEDICINE

## 2025-05-13 PROCEDURE — 3074F SYST BP LT 130 MM HG: CPT | Performed by: INTERNAL MEDICINE

## 2025-05-13 PROCEDURE — 1123F ACP DISCUSS/DSCN MKR DOCD: CPT | Performed by: INTERNAL MEDICINE

## 2025-05-13 NOTE — ASSESSMENT & PLAN NOTE
Had isolated episode of atrial fibrillation on January 19 with symptoms of fatigue and not feeling good.  Denies much palpitations and currently on amiodarone, atenolol and anticoagulated with Pradaxa for chads vascular score of 3.  Anticipating A-fib ablation EP guided therapy.  She is also scheduled for watchman left atrial appendage closure device implantation.

## 2025-05-14 ENCOUNTER — TELEPHONE (OUTPATIENT)
Age: 80
End: 2025-05-14

## 2025-05-14 ASSESSMENT — ENCOUNTER SYMPTOMS
WHEEZING: 0
ABDOMINAL PAIN: 0
BLOOD IN STOOL: 0
COLOR CHANGE: 0
EYE PAIN: 0
CHEST TIGHTNESS: 0
COUGH: 0
VOMITING: 0
SHORTNESS OF BREATH: 1
PHOTOPHOBIA: 0
CONSTIPATION: 0
NAUSEA: 0
BACK PAIN: 0
DIARRHEA: 0

## 2025-05-14 NOTE — TELEPHONE ENCOUNTER
Received call from Dr Avelar that patient had a lot of questions regarding Watchman/Ablation.  Called the patient.  All questions verbalized answered.  She will call me back if further questions arise.  Will follow.  Electronically signed by Sandra Villeda RN on 5/14/2025 at 3:03 PM Watchman Care Coordinator

## 2025-05-16 ENCOUNTER — TELEPHONE (OUTPATIENT)
Dept: CARDIOLOGY CLINIC | Age: 80
End: 2025-05-16

## 2025-05-16 NOTE — TELEPHONE ENCOUNTER
Patient called since she started Amiodarone   Her ankles are swollen , information from  Pharmacy instructed to call if this occurs.

## 2025-05-16 NOTE — TELEPHONE ENCOUNTER
Called patient and advised per Ros try cutting amiodarone in half daily, cut back on salt and elevate legs when sitting no dangling. Patient stated understanding.

## 2025-05-21 ENCOUNTER — TELEPHONE (OUTPATIENT)
Dept: CARDIOLOGY CLINIC | Age: 80
End: 2025-05-21

## 2025-05-21 NOTE — TELEPHONE ENCOUNTER
Laurie from [facility] with approval case # [id number]  Codes quantity 1 [id number]  5 bed days starting 6/2/25 till 6/6/25

## 2025-05-22 ENCOUNTER — TELEPHONE (OUTPATIENT)
Dept: CARDIOLOGY CLINIC | Age: 80
End: 2025-05-22

## 2025-05-22 NOTE — TELEPHONE ENCOUNTER
Called patient and advised that once she has apt with dentist to call us back to let us know what the dentist finds about her tooth. Patient stated understanding.

## 2025-05-22 NOTE — TELEPHONE ENCOUNTER
Patient called she is feeling a pulse   In one of her teeth , she is trying   To get a hold of her dentist , her fear  Is if she needs any dental procedure   Will it  interfere with her up coming   Procedure .

## 2025-05-23 ENCOUNTER — TELEPHONE (OUTPATIENT)
Dept: CARDIOLOGY CLINIC | Age: 80
End: 2025-05-23

## 2025-05-29 ENCOUNTER — CLINICAL SUPPORT (OUTPATIENT)
Age: 80
End: 2025-05-29

## 2025-05-29 ENCOUNTER — HOSPITAL ENCOUNTER (OUTPATIENT)
Age: 80
Discharge: HOME | End: 2025-05-29
Payer: MEDICARE

## 2025-05-29 ENCOUNTER — HOSPITAL ENCOUNTER (OUTPATIENT)
Dept: GENERAL RADIOLOGY | Age: 80
Discharge: HOME | End: 2025-05-29
Payer: MEDICARE

## 2025-05-29 DIAGNOSIS — I48.0 PAF (PAROXYSMAL ATRIAL FIBRILLATION) (HCC): ICD-10-CM

## 2025-05-29 DIAGNOSIS — I48.0 PAF (PAROXYSMAL ATRIAL FIBRILLATION) (HCC): Primary | ICD-10-CM

## 2025-05-29 LAB
ANION GAP SERPL CALCULATED.3IONS-SCNC: 12 MMOL/L (ref 9–17)
BUN SERPL-MCNC: 30 MG/DL (ref 7–20)
CALCIUM SERPL-MCNC: 9.4 MG/DL (ref 8.3–10.6)
CHLORIDE SERPL-SCNC: 100 MMOL/L (ref 99–110)
CO2 SERPL-SCNC: 30 MMOL/L (ref 21–32)
CREAT SERPL-MCNC: 1.7 MG/DL (ref 0.6–1.2)
ERYTHROCYTE [DISTWIDTH] IN BLOOD BY AUTOMATED COUNT: 14.2 % (ref 11.7–14.9)
GFR, ESTIMATED: 30 ML/MIN/1.73M2
GLUCOSE SERPL-MCNC: 100 MG/DL (ref 74–99)
HCT VFR BLD AUTO: 43.2 % (ref 37–47)
HGB BLD-MCNC: 13.1 G/DL (ref 12.5–16)
INR PPP: 1.3
MAGNESIUM SERPL-MCNC: 3 MG/DL (ref 1.8–2.4)
MCH RBC QN AUTO: 28.5 PG (ref 27–31)
MCHC RBC AUTO-ENTMCNC: 30.3 G/DL (ref 32–36)
MCV RBC AUTO: 93.9 FL (ref 78–100)
PARTIAL THROMBOPLASTIN TIME: 53.6 SEC (ref 25.1–37.1)
PHOSPHATE SERPL-MCNC: 4.2 MG/DL (ref 2.5–4.9)
PLATELET # BLD AUTO: 226 K/UL (ref 140–440)
PMV BLD AUTO: 11.4 FL (ref 7.5–11.1)
POTASSIUM SERPL-SCNC: 4.5 MMOL/L (ref 3.5–5.1)
PROTHROMBIN TIME: 16.3 SEC (ref 11.7–14.5)
RBC # BLD AUTO: 4.6 M/UL (ref 4.2–5.4)
SODIUM SERPL-SCNC: 141 MMOL/L (ref 136–145)
WBC OTHER # BLD: 9.6 K/UL (ref 4–10.5)

## 2025-05-29 PROCEDURE — 85610 PROTHROMBIN TIME: CPT

## 2025-05-29 PROCEDURE — 85027 COMPLETE CBC AUTOMATED: CPT

## 2025-05-29 PROCEDURE — 87635 SARS-COV-2 COVID-19 AMP PRB: CPT

## 2025-05-29 PROCEDURE — 85730 THROMBOPLASTIN TIME PARTIAL: CPT

## 2025-05-29 PROCEDURE — 83735 ASSAY OF MAGNESIUM: CPT

## 2025-05-29 PROCEDURE — 71046 X-RAY EXAM CHEST 2 VIEWS: CPT

## 2025-05-29 PROCEDURE — 80048 BASIC METABOLIC PNL TOTAL CA: CPT

## 2025-05-29 PROCEDURE — 84100 ASSAY OF PHOSPHORUS: CPT

## 2025-05-29 NOTE — PROGRESS NOTES
Patient here in office and educated on 5/29/2025, scheduled for A-fib ablation/Watchman Implant on 6/2/2025 @ 0700, with arrival @ 0530, @ [facility]; consents signed. Copy of orders given for labs, COVID and CXR due 5/29/2025 at [facility]. Instructions given to patient to :NPO after midnight including water the night before procedure. May take rest of morning medications day of procedure except the following; Hold ALL blood pressure medications morning of procedure. Hold Pradaxa the evening dose night before procedure on 6/1/2025 and morning dose of the procedure 6/2/2025. Advised patient to plan for an overnight stay in the hospital. Patient voiced understanding. Copies of consent & info scanned in chart.

## 2025-05-30 ENCOUNTER — TELEPHONE (OUTPATIENT)
Dept: CARDIOLOGY CLINIC | Age: 80
End: 2025-05-30

## 2025-05-30 LAB
SARS-COV-2 RNA RESP QL NAA+PROBE: NOT DETECTED
SPECIMEN DESCRIPTION: NORMAL

## 2025-05-30 NOTE — TELEPHONE ENCOUNTER
Called patient she had questions about her medications all questions answered. No other c/o noted.

## 2025-05-31 ENCOUNTER — ANESTHESIA EVENT (OUTPATIENT)
Age: 80
DRG: 317 | End: 2025-05-31
Payer: MEDICARE

## 2025-05-31 NOTE — ANESTHESIA PRE PROCEDURE
Department of Anesthesiology  Preprocedure Note       Name:  Virgie Segura   Age:  79 y.o.  :  1945                                          MRN:  [id number]         Date:  2025      Surgeon: Surgeon(s):  Luciano Romano MD    Procedure: Procedure(s):  Ablation A-fib w complete ep study @ 0700  Watchman shelley closure device    Medications prior to admission:   Prior to Admission medications    Medication Sig Start Date End Date Taking? Authorizing Provider   PROLIA 60 MG/ML SOSY SC injection  25   Roney Rosen MD   ALPRAZolam (XANAX) 1 MG tablet 1 tablet nightly as needed.  Patient taking differently: 1 tablet nightly as needed. 1mg a night and .25 as needed during the day 25   Roney Rosen MD   amiodarone (CORDARONE) 200 MG tablet Take 1 tablet by mouth daily  Patient taking differently: Take 0.5 tablets by mouth daily 25   Luciano Romano MD   lubiprostone (AMITIZA) 8 MCG CAPS capsule Take 1 capsule by mouth daily    Roney Rosen MD   dabigatran (PRADAXA) 75 MG capsule Take 1 capsule by mouth 2 times daily 25  Dylan Schwartz MD   potassium chloride (KLOR-CON M) 10 MEQ extended release tablet Take 2 tablets by mouth daily 2/3/25   Delroy Fontanez MD   furosemide (LASIX) 40 MG tablet TAKE ONE (1) TABLET BY MOUTH AS DIRECTED ON MON-WED-FRI 2/3/25   Delroy Fontanez MD   atenolol (TENORMIN) 25 MG tablet Take 0.5 tablets by mouth daily 25   Jacques Coleman MD   DULoxetine (CYMBALTA) 60 MG extended release capsule Take 1 capsule by mouth daily 24   Yusef Tillman MD   nortriptyline (PAMELOR) 10 MG capsule ONE CAPSULE BY MOUTH ONCE DAILY 24   Yusef Tillman MD   gabapentin (NEURONTIN) 300 MG capsule Take 1 capsule by mouth nightly. 24  Yusef Tillman MD   ESTRADIOL [facility] 0.2 g vaginally 2x weekly    Roney Rosen MD   clotrimazole (LOTRIMIN) 1 % cream PLEASE SEE ATTACHED FOR DETAILED

## 2025-06-02 ENCOUNTER — ANESTHESIA (OUTPATIENT)
Age: 80
DRG: 317 | End: 2025-06-02
Payer: MEDICARE

## 2025-06-02 ENCOUNTER — HOSPITAL ENCOUNTER (INPATIENT)
Age: 80
LOS: 1 days | Discharge: HOME | DRG: 317 | End: 2025-06-03
Attending: INTERNAL MEDICINE | Admitting: INTERNAL MEDICINE
Payer: MEDICARE

## 2025-06-02 ENCOUNTER — APPOINTMENT (OUTPATIENT)
Dept: NON INVASIVE DIAGNOSTICS | Age: 80
DRG: 317 | End: 2025-06-02
Attending: INTERNAL MEDICINE
Payer: MEDICARE

## 2025-06-02 ENCOUNTER — APPOINTMENT (OUTPATIENT)
Dept: GENERAL RADIOLOGY | Age: 80
DRG: 317 | End: 2025-06-02
Attending: INTERNAL MEDICINE
Payer: MEDICARE

## 2025-06-02 DIAGNOSIS — Z91.81 AT HIGH RISK FOR INJURY RELATED TO FALL: ICD-10-CM

## 2025-06-02 DIAGNOSIS — Z48.89 ENCOUNTER FOR OTHER SPECIFIED SURGICAL AFTERCARE: Primary | ICD-10-CM

## 2025-06-02 DIAGNOSIS — I48.0 PAF (PAROXYSMAL ATRIAL FIBRILLATION) (HCC): ICD-10-CM

## 2025-06-02 PROBLEM — Z95.818 PRESENCE OF WATCHMAN LEFT ATRIAL APPENDAGE CLOSURE DEVICE: Status: ACTIVE | Noted: 2025-06-02

## 2025-06-02 LAB
ACTIVATED CLOTTING TIME, LOW RANGE: 215 SEC (ref 89–169)
ACTIVATED CLOTTING TIME, LOW RANGE: >400 SEC (ref 89–169)
ACTIVATED CLOTTING TIME, LOW RANGE: >400 SEC (ref 89–169)
ECHO BSA: 1.6 M2
ECHO EST RA PRESSURE: 3 MMHG
ECHO LV EDV A4C: 38 ML
ECHO LV EDV INDEX A4C: 24 ML/M2
ECHO LV EF PHYSICIAN: 55 %
ECHO LV EJECTION FRACTION A4C: 58 %
ECHO LV ESV A4C: 16 ML
ECHO LV ESV INDEX A4C: 10 ML/M2
ECHO LV FRACTIONAL SHORTENING: 36 % (ref 28–44)
ECHO LV INTERNAL DIMENSION DIASTOLE INDEX: 2.78 CM/M2
ECHO LV INTERNAL DIMENSION DIASTOLIC: 4.4 CM (ref 3.9–5.3)
ECHO LV INTERNAL DIMENSION SYSTOLIC INDEX: 1.77 CM/M2
ECHO LV INTERNAL DIMENSION SYSTOLIC: 2.8 CM
ECHO LV IVSD: 0.8 CM (ref 0.6–0.9)
ECHO LV MASS 2D: 118.6 G (ref 67–162)
ECHO LV MASS INDEX 2D: 75 G/M2 (ref 43–95)
ECHO LV POSTERIOR WALL DIASTOLIC: 0.9 CM (ref 0.6–0.9)
ECHO LV RELATIVE WALL THICKNESS RATIO: 0.41
ECHO RIGHT VENTRICULAR SYSTOLIC PRESSURE (RVSP): 26 MMHG
ECHO TV REGURGITANT MAX VELOCITY: 2.42 M/S
ECHO TV REGURGITANT PEAK GRADIENT: 23 MMHG
EKG ATRIAL RATE: 66 BPM
EKG DIAGNOSIS: NORMAL
EKG P AXIS: -7 DEGREES
EKG P-R INTERVAL: 144 MS
EKG Q-T INTERVAL: 470 MS
EKG QRS DURATION: 80 MS
EKG QTC CALCULATION (BAZETT): 492 MS
EKG R AXIS: 6 DEGREES
EKG T AXIS: 32 DEGREES
EKG VENTRICULAR RATE: 66 BPM
GLUCOSE BLD-MCNC: 100 MG/DL (ref 74–99)
GLUCOSE BLD-MCNC: 49 MG/DL (ref 74–99)
GLUCOSE BLD-MCNC: 64 MG/DL (ref 74–99)

## 2025-06-02 PROCEDURE — 93656 COMPRE EP EVAL ABLTJ ATR FIB: CPT | Performed by: INTERNAL MEDICINE

## 2025-06-02 PROCEDURE — 2720000010 HC SURG SUPPLY STERILE: Performed by: INTERNAL MEDICINE

## 2025-06-02 PROCEDURE — 93321 DOPPLER ECHO F-UP/LMTD STD: CPT

## 2025-06-02 PROCEDURE — C1889 IMPLANT/INSERT DEVICE, NOC: HCPCS | Performed by: INTERNAL MEDICINE

## 2025-06-02 PROCEDURE — 4A0234Z MEASUREMENT OF CARDIAC ELECTRICAL ACTIVITY, PERCUTANEOUS APPROACH: ICD-10-PCS | Performed by: INTERNAL MEDICINE

## 2025-06-02 PROCEDURE — C1894 INTRO/SHEATH, NON-LASER: HCPCS | Performed by: INTERNAL MEDICINE

## 2025-06-02 PROCEDURE — 86850 RBC ANTIBODY SCREEN: CPT

## 2025-06-02 PROCEDURE — 93321 DOPPLER ECHO F-UP/LMTD STD: CPT | Performed by: INTERNAL MEDICINE

## 2025-06-02 PROCEDURE — 93005 ELECTROCARDIOGRAM TRACING: CPT | Performed by: INTERNAL MEDICINE

## 2025-06-02 PROCEDURE — 93325 DOPPLER ECHO COLOR FLOW MAPG: CPT | Performed by: INTERNAL MEDICINE

## 2025-06-02 PROCEDURE — C1732 CATH, EP, DIAG/ABL, 3D/VECT: HCPCS | Performed by: INTERNAL MEDICINE

## 2025-06-02 PROCEDURE — 86900 BLOOD TYPING SEROLOGIC ABO: CPT

## 2025-06-02 PROCEDURE — 6360000002 HC RX W HCPCS: Performed by: INTERNAL MEDICINE

## 2025-06-02 PROCEDURE — 33340 PERQ CLSR TCAT L ATR APNDGE: CPT | Performed by: INTERNAL MEDICINE

## 2025-06-02 PROCEDURE — 93325 DOPPLER ECHO COLOR FLOW MAPG: CPT

## 2025-06-02 PROCEDURE — 2580000003 HC RX 258

## 2025-06-02 PROCEDURE — 93312 ECHO TRANSESOPHAGEAL: CPT | Performed by: INTERNAL MEDICINE

## 2025-06-02 PROCEDURE — 2060000000 HC ICU INTERMEDIATE R&B

## 2025-06-02 PROCEDURE — C1759 CATH, INTRA ECHOCARDIOGRAPHY: HCPCS | Performed by: INTERNAL MEDICINE

## 2025-06-02 PROCEDURE — 86901 BLOOD TYPING SEROLOGIC RH(D): CPT

## 2025-06-02 PROCEDURE — 02583ZZ DESTRUCTION OF CONDUCTION MECHANISM, PERCUTANEOUS APPROACH: ICD-10-PCS | Performed by: INTERNAL MEDICINE

## 2025-06-02 PROCEDURE — B24BZZ4 ULTRASONOGRAPHY OF HEART WITH AORTA, TRANSESOPHAGEAL: ICD-10-PCS | Performed by: INTERNAL MEDICINE

## 2025-06-02 PROCEDURE — 6360000002 HC RX W HCPCS

## 2025-06-02 PROCEDURE — C1730 CATH, EP, 19 OR FEW ELECT: HCPCS | Performed by: INTERNAL MEDICINE

## 2025-06-02 PROCEDURE — 93623 PRGRMD STIMJ&PACG IV RX NFS: CPT | Performed by: INTERNAL MEDICINE

## 2025-06-02 PROCEDURE — 6370000000 HC RX 637 (ALT 250 FOR IP)

## 2025-06-02 PROCEDURE — 6360000004 HC RX CONTRAST MEDICATION

## 2025-06-02 PROCEDURE — 4A023FZ MEASUREMENT OF CARDIAC RHYTHM, PERCUTANEOUS APPROACH: ICD-10-PCS | Performed by: INTERNAL MEDICINE

## 2025-06-02 PROCEDURE — 2500000003 HC RX 250 WO HCPCS

## 2025-06-02 PROCEDURE — C1893 INTRO/SHEATH, FIXED,NON-PEEL: HCPCS | Performed by: INTERNAL MEDICINE

## 2025-06-02 PROCEDURE — 93308 TTE F-UP OR LMTD: CPT | Performed by: INTERNAL MEDICINE

## 2025-06-02 PROCEDURE — 93320 DOPPLER ECHO COMPLETE: CPT | Performed by: INTERNAL MEDICINE

## 2025-06-02 PROCEDURE — 2580000003 HC RX 258: Performed by: INTERNAL MEDICINE

## 2025-06-02 PROCEDURE — 86920 COMPATIBILITY TEST SPIN: CPT

## 2025-06-02 PROCEDURE — 2580000003 HC RX 258: Performed by: ANESTHESIOLOGY

## 2025-06-02 PROCEDURE — 71045 X-RAY EXAM CHEST 1 VIEW: CPT

## 2025-06-02 PROCEDURE — 7100000001 HC PACU RECOVERY - ADDTL 15 MIN: Performed by: INTERNAL MEDICINE

## 2025-06-02 PROCEDURE — 7100000000 HC PACU RECOVERY - FIRST 15 MIN: Performed by: INTERNAL MEDICINE

## 2025-06-02 PROCEDURE — 02K83ZZ MAP CONDUCTION MECHANISM, PERCUTANEOUS APPROACH: ICD-10-PCS | Performed by: INTERNAL MEDICINE

## 2025-06-02 PROCEDURE — C1760 CLOSURE DEV, VASC: HCPCS | Performed by: INTERNAL MEDICINE

## 2025-06-02 PROCEDURE — 02L73DK OCCLUSION OF LEFT ATRIAL APPENDAGE WITH INTRALUMINAL DEVICE, PERCUTANEOUS APPROACH: ICD-10-PCS | Performed by: INTERNAL MEDICINE

## 2025-06-02 PROCEDURE — C1769 GUIDE WIRE: HCPCS | Performed by: INTERNAL MEDICINE

## 2025-06-02 PROCEDURE — 2580000003 HC RX 258: Performed by: NURSE PRACTITIONER

## 2025-06-02 PROCEDURE — 82962 GLUCOSE BLOOD TEST: CPT

## 2025-06-02 PROCEDURE — 85347 COAGULATION TIME ACTIVATED: CPT

## 2025-06-02 PROCEDURE — 3700000001 HC ADD 15 MINUTES (ANESTHESIA): Performed by: INTERNAL MEDICINE

## 2025-06-02 PROCEDURE — 2709999900 HC NON-CHARGEABLE SUPPLY: Performed by: INTERNAL MEDICINE

## 2025-06-02 PROCEDURE — C1733 CATH, EP, OTHR THAN COOL-TIP: HCPCS | Performed by: INTERNAL MEDICINE

## 2025-06-02 PROCEDURE — 3700000000 HC ANESTHESIA ATTENDED CARE: Performed by: INTERNAL MEDICINE

## 2025-06-02 PROCEDURE — 93010 ELECTROCARDIOGRAM REPORT: CPT | Performed by: INTERNAL MEDICINE

## 2025-06-02 DEVICE — LEFT ATRIAL APPENDAGE CLOSURE DEVICE WITH DELIVERY SYSTEM
Type: IMPLANTABLE DEVICE | Status: FUNCTIONAL
Brand: WATCHMAN FLX™ PRO

## 2025-06-02 RX ORDER — DULOXETIN HYDROCHLORIDE 30 MG/1
60 CAPSULE, DELAYED RELEASE ORAL DAILY
Status: DISCONTINUED | OUTPATIENT
Start: 2025-06-02 | End: 2025-06-03 | Stop reason: HOSPADM

## 2025-06-02 RX ORDER — METOCLOPRAMIDE HYDROCHLORIDE 5 MG/ML
10 INJECTION INTRAMUSCULAR; INTRAVENOUS
Status: DISCONTINUED | OUTPATIENT
Start: 2025-06-02 | End: 2025-06-02 | Stop reason: HOSPADM

## 2025-06-02 RX ORDER — SODIUM CHLORIDE 9 MG/ML
INJECTION, SOLUTION INTRAVENOUS PRN
Status: DISCONTINUED | OUTPATIENT
Start: 2025-06-02 | End: 2025-06-03 | Stop reason: HOSPADM

## 2025-06-02 RX ORDER — ALPRAZOLAM 0.5 MG
1 TABLET ORAL NIGHTLY PRN
Status: DISCONTINUED | OUTPATIENT
Start: 2025-06-02 | End: 2025-06-03 | Stop reason: HOSPADM

## 2025-06-02 RX ORDER — FUROSEMIDE 40 MG/1
40 TABLET ORAL
Status: DISCONTINUED | OUTPATIENT
Start: 2025-06-02 | End: 2025-06-03 | Stop reason: HOSPADM

## 2025-06-02 RX ORDER — PROPOFOL 10 MG/ML
INJECTION, EMULSION INTRAVENOUS
Status: DISCONTINUED | OUTPATIENT
Start: 2025-06-02 | End: 2025-06-02 | Stop reason: SDUPTHER

## 2025-06-02 RX ORDER — SODIUM CHLORIDE 9 MG/ML
INJECTION, SOLUTION INTRAVENOUS
Status: DISCONTINUED | OUTPATIENT
Start: 2025-06-02 | End: 2025-06-02 | Stop reason: SDUPTHER

## 2025-06-02 RX ORDER — SODIUM CHLORIDE 0.9 % (FLUSH) 0.9 %
5-40 SYRINGE (ML) INJECTION EVERY 12 HOURS SCHEDULED
Status: DISCONTINUED | OUTPATIENT
Start: 2025-06-02 | End: 2025-06-02 | Stop reason: HOSPADM

## 2025-06-02 RX ORDER — LABETALOL HYDROCHLORIDE 5 MG/ML
10 INJECTION, SOLUTION INTRAVENOUS
Status: DISCONTINUED | OUTPATIENT
Start: 2025-06-02 | End: 2025-06-02 | Stop reason: HOSPADM

## 2025-06-02 RX ORDER — 0.9 % SODIUM CHLORIDE 0.9 %
500 INTRAVENOUS SOLUTION INTRAVENOUS ONCE
Status: COMPLETED | OUTPATIENT
Start: 2025-06-02 | End: 2025-06-02

## 2025-06-02 RX ORDER — VITAMIN B COMPLEX
5000 TABLET ORAL DAILY
Status: DISCONTINUED | OUTPATIENT
Start: 2025-06-02 | End: 2025-06-03 | Stop reason: HOSPADM

## 2025-06-02 RX ORDER — NORTRIPTYLINE HYDROCHLORIDE 10 MG/1
10 CAPSULE ORAL NIGHTLY
Status: DISCONTINUED | OUTPATIENT
Start: 2025-06-02 | End: 2025-06-03 | Stop reason: HOSPADM

## 2025-06-02 RX ORDER — SODIUM CHLORIDE 9 MG/ML
INJECTION, SOLUTION INTRAVENOUS PRN
Status: DISCONTINUED | OUTPATIENT
Start: 2025-06-02 | End: 2025-06-02 | Stop reason: HOSPADM

## 2025-06-02 RX ORDER — PROTAMINE SULFATE 10 MG/ML
INJECTION, SOLUTION INTRAVENOUS
Status: DISCONTINUED | OUTPATIENT
Start: 2025-06-02 | End: 2025-06-02 | Stop reason: SDUPTHER

## 2025-06-02 RX ORDER — SODIUM CHLORIDE 0.9 % (FLUSH) 0.9 %
5-40 SYRINGE (ML) INJECTION EVERY 12 HOURS SCHEDULED
Status: DISCONTINUED | OUTPATIENT
Start: 2025-06-02 | End: 2025-06-03 | Stop reason: HOSPADM

## 2025-06-02 RX ORDER — SODIUM CHLORIDE 0.9 % (FLUSH) 0.9 %
5-40 SYRINGE (ML) INJECTION PRN
Status: DISCONTINUED | OUTPATIENT
Start: 2025-06-02 | End: 2025-06-03 | Stop reason: HOSPADM

## 2025-06-02 RX ORDER — ASPIRIN 81 MG/1
81 TABLET, CHEWABLE ORAL EVERY MORNING
Status: DISCONTINUED | OUTPATIENT
Start: 2025-06-02 | End: 2025-06-03 | Stop reason: HOSPADM

## 2025-06-02 RX ORDER — ALLOPURINOL 100 MG/1
50 TABLET ORAL DAILY
Status: DISCONTINUED | OUTPATIENT
Start: 2025-06-02 | End: 2025-06-03 | Stop reason: HOSPADM

## 2025-06-02 RX ORDER — CLOBETASOL PROPIONATE 0.5 MG/G
OINTMENT TOPICAL 2 TIMES DAILY
Status: DISCONTINUED | OUTPATIENT
Start: 2025-06-02 | End: 2025-06-03 | Stop reason: HOSPADM

## 2025-06-02 RX ORDER — LIDOCAINE HYDROCHLORIDE 20 MG/ML
INJECTION, SOLUTION INFILTRATION; PERINEURAL
Status: DISCONTINUED | OUTPATIENT
Start: 2025-06-02 | End: 2025-06-02 | Stop reason: SDUPTHER

## 2025-06-02 RX ORDER — AMIODARONE HYDROCHLORIDE 200 MG/1
100 TABLET ORAL DAILY
Status: DISCONTINUED | OUTPATIENT
Start: 2025-06-02 | End: 2025-06-03 | Stop reason: HOSPADM

## 2025-06-02 RX ORDER — POTASSIUM CHLORIDE 1500 MG/1
20 TABLET, EXTENDED RELEASE ORAL DAILY
Status: DISCONTINUED | OUTPATIENT
Start: 2025-06-02 | End: 2025-06-03 | Stop reason: HOSPADM

## 2025-06-02 RX ORDER — DABIGATRAN ETEXILATE 75 MG/1
75 CAPSULE ORAL 2 TIMES DAILY
Status: DISCONTINUED | OUTPATIENT
Start: 2025-06-02 | End: 2025-06-03 | Stop reason: HOSPADM

## 2025-06-02 RX ORDER — MICONAZOLE NITRATE 20 MG/G
CREAM TOPICAL 2 TIMES DAILY
Status: DISCONTINUED | OUTPATIENT
Start: 2025-06-02 | End: 2025-06-03 | Stop reason: HOSPADM

## 2025-06-02 RX ORDER — PANTOPRAZOLE SODIUM 40 MG/1
40 TABLET, DELAYED RELEASE ORAL
Status: DISCONTINUED | OUTPATIENT
Start: 2025-06-02 | End: 2025-06-03 | Stop reason: HOSPADM

## 2025-06-02 RX ORDER — DIPHENHYDRAMINE HYDROCHLORIDE 50 MG/ML
12.5 INJECTION, SOLUTION INTRAMUSCULAR; INTRAVENOUS
Status: DISCONTINUED | OUTPATIENT
Start: 2025-06-02 | End: 2025-06-02 | Stop reason: HOSPADM

## 2025-06-02 RX ORDER — ADENOSINE 3 MG/ML
INJECTION, SOLUTION INTRAVENOUS PRN
Status: DISCONTINUED | OUTPATIENT
Start: 2025-06-02 | End: 2025-06-02 | Stop reason: HOSPADM

## 2025-06-02 RX ORDER — ROCURONIUM BROMIDE 10 MG/ML
INJECTION, SOLUTION INTRAVENOUS
Status: DISCONTINUED | OUTPATIENT
Start: 2025-06-02 | End: 2025-06-02 | Stop reason: SDUPTHER

## 2025-06-02 RX ORDER — SODIUM CHLORIDE 0.9 % (FLUSH) 0.9 %
5-40 SYRINGE (ML) INJECTION PRN
Status: DISCONTINUED | OUTPATIENT
Start: 2025-06-02 | End: 2025-06-02 | Stop reason: HOSPADM

## 2025-06-02 RX ORDER — ONDANSETRON 2 MG/ML
INJECTION INTRAMUSCULAR; INTRAVENOUS
Status: DISCONTINUED | OUTPATIENT
Start: 2025-06-02 | End: 2025-06-02 | Stop reason: SDUPTHER

## 2025-06-02 RX ORDER — NALOXONE HYDROCHLORIDE 0.4 MG/ML
INJECTION, SOLUTION INTRAMUSCULAR; INTRAVENOUS; SUBCUTANEOUS PRN
Status: DISCONTINUED | OUTPATIENT
Start: 2025-06-02 | End: 2025-06-02 | Stop reason: HOSPADM

## 2025-06-02 RX ORDER — GABAPENTIN 300 MG/1
300 CAPSULE ORAL NIGHTLY
Status: DISCONTINUED | OUTPATIENT
Start: 2025-06-02 | End: 2025-06-03 | Stop reason: HOSPADM

## 2025-06-02 RX ORDER — SODIUM CHLORIDE, SODIUM LACTATE, POTASSIUM CHLORIDE, CALCIUM CHLORIDE 600; 310; 30; 20 MG/100ML; MG/100ML; MG/100ML; MG/100ML
INJECTION, SOLUTION INTRAVENOUS CONTINUOUS
Status: DISCONTINUED | OUTPATIENT
Start: 2025-06-02 | End: 2025-06-02 | Stop reason: HOSPADM

## 2025-06-02 RX ORDER — LUBIPROSTONE 8 UG/1
8 CAPSULE ORAL DAILY
Status: DISCONTINUED | OUTPATIENT
Start: 2025-06-02 | End: 2025-06-03 | Stop reason: HOSPADM

## 2025-06-02 RX ORDER — ACETAMINOPHEN 325 MG/1
650 TABLET ORAL
Status: DISCONTINUED | OUTPATIENT
Start: 2025-06-02 | End: 2025-06-02 | Stop reason: HOSPADM

## 2025-06-02 RX ORDER — M-VIT,TX,IRON,MINS/CALC/FOLIC 27MG-0.4MG
1 TABLET ORAL DAILY
Status: DISCONTINUED | OUTPATIENT
Start: 2025-06-02 | End: 2025-06-03 | Stop reason: HOSPADM

## 2025-06-02 RX ORDER — LORAZEPAM 2 MG/ML
0.5 INJECTION INTRAMUSCULAR
Status: DISCONTINUED | OUTPATIENT
Start: 2025-06-02 | End: 2025-06-02 | Stop reason: HOSPADM

## 2025-06-02 RX ORDER — ATENOLOL 25 MG/1
12.5 TABLET ORAL DAILY
Status: DISCONTINUED | OUTPATIENT
Start: 2025-06-02 | End: 2025-06-03 | Stop reason: HOSPADM

## 2025-06-02 RX ORDER — BENZOCAINE/MENTHOL 6 MG-10 MG
LOZENGE MUCOUS MEMBRANE DAILY
Status: DISCONTINUED | OUTPATIENT
Start: 2025-06-02 | End: 2025-06-03 | Stop reason: HOSPADM

## 2025-06-02 RX ORDER — ONDANSETRON 2 MG/ML
4 INJECTION INTRAMUSCULAR; INTRAVENOUS
Status: DISCONTINUED | OUTPATIENT
Start: 2025-06-02 | End: 2025-06-02 | Stop reason: HOSPADM

## 2025-06-02 RX ORDER — HEPARIN SODIUM 10000 [USP'U]/ML
INJECTION, SOLUTION INTRAVENOUS; SUBCUTANEOUS PRN
Status: DISCONTINUED | OUTPATIENT
Start: 2025-06-02 | End: 2025-06-02 | Stop reason: HOSPADM

## 2025-06-02 RX ORDER — FOLIC ACID 1 MG/1
1 TABLET ORAL DAILY
Status: DISCONTINUED | OUTPATIENT
Start: 2025-06-02 | End: 2025-06-03 | Stop reason: HOSPADM

## 2025-06-02 RX ADMIN — SODIUM CHLORIDE 500 ML: 0.9 INJECTION, SOLUTION INTRAVENOUS at 19:30

## 2025-06-02 RX ADMIN — PHENYLEPHRINE HYDROCHLORIDE 50 MCG: 10 INJECTION INTRAVENOUS at 07:30

## 2025-06-02 RX ADMIN — PHENYLEPHRINE HYDROCHLORIDE 50 MCG: 10 INJECTION INTRAVENOUS at 07:59

## 2025-06-02 RX ADMIN — PHENYLEPHRINE HYDROCHLORIDE 30 MCG/MIN: 10 INJECTION INTRAVENOUS at 07:37

## 2025-06-02 RX ADMIN — ALPRAZOLAM 1 MG: 0.5 TABLET ORAL at 21:48

## 2025-06-02 RX ADMIN — SODIUM CHLORIDE: 9 INJECTION, SOLUTION INTRAVENOUS at 07:06

## 2025-06-02 RX ADMIN — ROCURONIUM BROMIDE 50 MG: 10 INJECTION INTRAVENOUS at 07:16

## 2025-06-02 RX ADMIN — ONDANSETRON 4 MG: 2 INJECTION INTRAMUSCULAR; INTRAVENOUS at 07:06

## 2025-06-02 RX ADMIN — ALLOPURINOL 50 MG: 100 TABLET ORAL at 16:32

## 2025-06-02 RX ADMIN — SODIUM CHLORIDE 500 ML: 0.9 INJECTION, SOLUTION INTRAVENOUS at 11:07

## 2025-06-02 RX ADMIN — GABAPENTIN 300 MG: 300 CAPSULE ORAL at 21:22

## 2025-06-02 RX ADMIN — ATENOLOL 12.5 MG: 25 TABLET ORAL at 16:32

## 2025-06-02 RX ADMIN — SUGAMMADEX 200 MG: 100 INJECTION, SOLUTION INTRAVENOUS at 09:35

## 2025-06-02 RX ADMIN — PROPOFOL 120 MG: 10 INJECTION, EMULSION INTRAVENOUS at 07:16

## 2025-06-02 RX ADMIN — DULOXETINE 60 MG: 30 CAPSULE, DELAYED RELEASE ORAL at 21:20

## 2025-06-02 RX ADMIN — PHENYLEPHRINE HYDROCHLORIDE 100 MCG: 10 INJECTION INTRAVENOUS at 07:37

## 2025-06-02 RX ADMIN — ASPIRIN 81 MG: 81 TABLET, CHEWABLE ORAL at 16:32

## 2025-06-02 RX ADMIN — DABIGATRAN ETEXILATE MESYLATE 75 MG: 75 CAPSULE ORAL at 21:49

## 2025-06-02 RX ADMIN — PROTAMINE SULFATE 30 MG: 10 INJECTION, SOLUTION INTRAVENOUS at 09:21

## 2025-06-02 RX ADMIN — LIDOCAINE HYDROCHLORIDE 100 MG: 20 INJECTION, SOLUTION INFILTRATION; PERINEURAL at 07:16

## 2025-06-02 RX ADMIN — NORTRIPTYLINE HYDROCHLORIDE 10 MG: 10 CAPSULE ORAL at 21:48

## 2025-06-02 RX ADMIN — ROCURONIUM BROMIDE 20 MG: 10 INJECTION INTRAVENOUS at 08:00

## 2025-06-02 RX ADMIN — ROCURONIUM BROMIDE 20 MG: 10 INJECTION INTRAVENOUS at 09:04

## 2025-06-02 RX ADMIN — AMIODARONE HYDROCHLORIDE 100 MG: 200 TABLET ORAL at 21:21

## 2025-06-02 RX ADMIN — VANCOMYCIN HYDROCHLORIDE 1000 MG: 1 INJECTION, POWDER, LYOPHILIZED, FOR SOLUTION INTRAVENOUS at 08:45

## 2025-06-02 RX ADMIN — SODIUM CHLORIDE, PRESERVATIVE FREE 10 ML: 5 INJECTION INTRAVENOUS at 21:19

## 2025-06-02 RX ADMIN — PHENYLEPHRINE HYDROCHLORIDE 50 MCG: 10 INJECTION INTRAVENOUS at 08:12

## 2025-06-02 RX ADMIN — PROPOFOL 50 MG: 10 INJECTION, EMULSION INTRAVENOUS at 07:49

## 2025-06-02 ASSESSMENT — ENCOUNTER SYMPTOMS
BLOOD IN STOOL: 0
CONSTIPATION: 0
EYE PAIN: 0
BACK PAIN: 0
PHOTOPHOBIA: 0
VOMITING: 0
WHEEZING: 0
COLOR CHANGE: 0
CHEST TIGHTNESS: 0
NAUSEA: 0
DIARRHEA: 0
ABDOMINAL PAIN: 0
SHORTNESS OF BREATH: 1
COUGH: 0

## 2025-06-02 ASSESSMENT — PAIN - FUNCTIONAL ASSESSMENT: PAIN_FUNCTIONAL_ASSESSMENT: PREVENTS OR INTERFERES SOME ACTIVE ACTIVITIES AND ADLS

## 2025-06-02 ASSESSMENT — PAIN DESCRIPTION - ORIENTATION: ORIENTATION: RIGHT

## 2025-06-02 ASSESSMENT — PAIN DESCRIPTION - FREQUENCY: FREQUENCY: CONTINUOUS

## 2025-06-02 ASSESSMENT — PAIN SCALES - GENERAL
PAINLEVEL_OUTOF10: 0
PAINLEVEL_OUTOF10: 3
PAINLEVEL_OUTOF10: 0

## 2025-06-02 ASSESSMENT — PAIN DESCRIPTION - PAIN TYPE: TYPE: SURGICAL PAIN

## 2025-06-02 ASSESSMENT — PAIN DESCRIPTION - DESCRIPTORS: DESCRIPTORS: ACHING

## 2025-06-02 ASSESSMENT — PAIN DESCRIPTION - LOCATION: LOCATION: GROIN

## 2025-06-02 NOTE — PLAN OF CARE
Patient belongings placed in same day surgery locker number 1. Code is [id number]    Patient placed her bilateral hearing aids and two partials in personal belongings when placed in locker

## 2025-06-02 NOTE — ANESTHESIA POSTPROCEDURE EVALUATION
Department of Anesthesiology  Postprocedure Note    Patient: Virgie Segura  MRN: [id number]  YOB: 1945  Date of evaluation: 6/2/2025    Procedure Summary       Date: 06/02/25 Room / Location: [facility] CATH LAB 2 / [facility] CARDIAC CATH LAB    Anesthesia Start: 0706 Anesthesia Stop: 0955    Procedures:       Ablation A-fib w complete ep study @ 0700      Watchman shelley closure device Diagnosis:       PAF (paroxysmal atrial fibrillation) (HCC)      (PAF (paroxysmal atrial fibrillation) (HCC) [I48.0])    Providers: Luciano Romano MD Responsible Provider: Deni Dowling MD    Anesthesia Type: general ASA Status: 3            Anesthesia Type: No value filed.    Norman Phase I: Norman Score: 10    Norman Phase II:      Anesthesia Post Evaluation    Patient location during evaluation: PACU  Patient participation: complete - patient participated  Level of consciousness: awake and alert  Airway patency: patent  Nausea & Vomiting: no nausea and no vomiting  Cardiovascular status: hemodynamically stable  Respiratory status: spontaneous ventilation and room air  Hydration status: euvolemic  Pain management: adequate    No notable events documented.

## 2025-06-02 NOTE — PROGRESS NOTES
4 Eyes Skin Assessment     NAME:  Virgie Segura  YOB: 1945  MEDICAL RECORD NUMBER:  [id number]    The patient is being assessed for  Admission    I agree that at least one RN has performed a thorough Head to Toe Skin Assessment on the patient. ALL assessment sites listed below have been assessed.      Areas assessed by both nurses:    Head, Face, Ears, Shoulders, Back, Chest, Arms, Elbows, Hands, Sacrum. Buttock, Coccyx, Ischium, Legs. Feet and Heels, and Under Medical Devices         Does the Patient have a Wound? No noted wound(s)       Scott Prevention initiated by RN: No  Wound Care Orders initiated by RN: No    Pressure Injury (Stage 3,4, Unstageable, DTI, NWPT, and Complex wounds) if present, place Wound referral order by RN under : No    New Ostomies, if present place, Ostomy referral order under : No     Nurse 1 eSignature: Electronically signed by Jessica Wisdom RN on 6/2/25 at 12:21 PM EDT    **SHARE this note so that the co-signing nurse can place an eSignature**    Nurse 2 eSignature: Electronically signed by Erlinda Owens RN on 6/2/25 at 12:28 PM EDT

## 2025-06-02 NOTE — PROGRESS NOTES
0952: Arrived to PACU from EP lab. Monitors applied, alarms on. Report obtained from Ha SHORT. Puncture sites soft, no swelling. Fingernails dusky (has Raynauds).  0955: Accu check 53. Rechecked 49. Patient awake, alert and talkative. When asked how she is feeling states fine.  1000: Dr. Dowling notified. Patient taking sips of orange juice.   1010;Am having hard time picking up ao2. Have tried various probes on fingers, toes, and ears. Have tried different cords and modules. Warm blankets on patient and around hands.  1025: EKG done.  1030: Echo being done. Accu check 64. Is still taking sips of orange juice.  1045: C/o slight discomfort but does not need anything at this time. No redness or swelling noted either groin. Accu check 100.  1100: Dr. Dowling notified patient's B/P is running 80's systolic and 40's diastolic. Patient states she normally runs low but not that low. Order received for 500cc NS bolus.  1107: Fluid bolus started as ordered.  1135: Bolus complete. Brother updated.  1140: B/P 103/53 MAP 67.  1150: Transported to room 2024. Transferred from cart to bed. Care assumed per Jessica SHORT.

## 2025-06-02 NOTE — PROGRESS NOTES
Pt bp 86/44 at shift change. Notified Ros Conway NP via [employer]. Orders for a 500 normal saline bolus given

## 2025-06-02 NOTE — ANESTHESIA PROCEDURE NOTES
Airway  Date/Time: 6/2/2025 7:18 AM  Urgency: elective    Airway not difficult    General Information and Staff    Patient location during procedure: OR  Resident/CRNA: Ha Upton APRN - CRNA  Performed: resident/CRNA/CAA   Performed by: Ha Upton APRN - CRNA  Authorized by: Deni Dowling MD      Indications and Patient Condition  Indications for airway management: anesthesia and airway protection  Spontaneous Ventilation: absent  Sedation level: deep  Preoxygenated: yes  Patient position: sniffing  MILS not maintained throughout  Mask difficulty assessment: vent by bag mask + OA or adjuvant +/- NMBA    Final Airway Details  Final airway type: endotracheal airway      Successful airway: ETT  Cuffed: yes   Successful intubation technique: video laryngoscopy  Facilitating devices/methods: intubating stylet  Endotracheal tube insertion site: oral  Blade: Marrero  Blade size: #3  ETT size (mm): 7.0  Cormack-Lehane Classification: grade I - full view of glottis  Placement verified by: chest auscultation and capnometry   Measured from: lips  ETT to lips (cm): 21  Number of attempts at approach: 1  Ventilation between attempts: bag mask  Number of other approaches attempted: 0    no

## 2025-06-02 NOTE — H&P
Electrophysiology H&p  Note      Reason for consultation:  Atrial fibrillation    Chief complaint : Atrial fibrillation ablation and watchman implantation    Referring physician:  / De Sarmiento APRN-CNP      Primary care physician: Elaine Cain MD      History of Present Illness:     Today visit (06/02/25)    Patient here for atrial fib ablation and watchman implantation. No change in H&P noted from previous clinic visit.     Previous visit:   History of Present Illness  The patient is an 79-year-old female with a history of paroxysmal atrial fibrillation (PAF), mitral valve prolapse, hypertension, and hyperlipidemia, referred by Dr. Lozano for evaluation of her A-fib management and Watchman device. She is accompanied by her brother.    She reports experiencing fatigue since early January 2025, which she attributes to her relocation in July 2023. Despite not engaging in heavy lifting, she acknowledges feeling worn out. Patient states she feels palpitations due to Afib which can happen whenever. She recalls two instances in January 2025 where she went to bed early due to fatigue but was awakened around 1:00 AM by a sensation of being thrown across her apartment. During these episodes, she did not experience any arrhythmia but felt faint. She managed these episodes by consuming decaffeinated tea, orange juice, oats, and raisins. On 04/17/2025, she was awakened at 5:45 AM by a sensation of her heart beating intensely, akin to a sledgehammer, accompanied by arrhythmia. She sought emergency care at [facility]. On 05/02/2025, while resting in her recliner, she experienced normal breathing but felt a constant sensation in her chest. This persisted throughout the day, leading her to seek medical attention at [facility]. She was discharged home later that night. A similar episode occurred on 05/05/2025. She has been on Pradaxa since January

## 2025-06-02 NOTE — PLAN OF CARE
Problem: Discharge Planning  Goal: Discharge to home or other facility with appropriate resources  Outcome: Progressing  Flowsheets (Taken 6/2/2025 1207)  Discharge to home or other facility with appropriate resources:   Identify barriers to discharge with patient and caregiver   Arrange for needed discharge resources and transportation as appropriate   Identify discharge learning needs (meds, wound care, etc)   Arrange for interpreters to assist at discharge as needed   Refer to discharge planning if patient needs post-hospital services based on physician order or complex needs related to functional status, cognitive ability or social support system     Problem: Chronic Conditions and Co-morbidities  Goal: Patient's chronic conditions and co-morbidity symptoms are monitored and maintained or improved  Outcome: Progressing  Flowsheets (Taken 6/2/2025 1207)  Care Plan - Patient's Chronic Conditions and Co-Morbidity Symptoms are Monitored and Maintained or Improved:   Monitor and assess patient's chronic conditions and comorbid symptoms for stability, deterioration, or improvement   Collaborate with multidisciplinary team to address chronic and comorbid conditions and prevent exacerbation or deterioration   Update acute care plan with appropriate goals if chronic or comorbid symptoms are exacerbated and prevent overall improvement and discharge      ETOH level high on admission  Now with exam findings to suggest withdrawal, tremors, anxiety, diaphoresis  Start CIWA monitoring  IV Ativan taper with symptom triggered Ativan  Spoke with sister Shirley: concern for drug misuse and suicide attempt (Tylenol + ASA + Klonopin + Melatonin) + ETOH, also expresses that she has been ingesting mouthwash.  There is no ISTOP record of Klonopin under the names Juliette Guevara, Juliette Whitaker, Juliette Escalera  Pt has attempted to ETOH rehab in past, but relapsed quickly

## 2025-06-03 VITALS
SYSTOLIC BLOOD PRESSURE: 96 MMHG | TEMPERATURE: 98.3 F | RESPIRATION RATE: 23 BRPM | HEART RATE: 76 BPM | DIASTOLIC BLOOD PRESSURE: 75 MMHG | OXYGEN SATURATION: 99 % | HEIGHT: 61 IN | WEIGHT: 131 LBS | BODY MASS INDEX: 24.73 KG/M2

## 2025-06-03 LAB
ANION GAP SERPL CALCULATED.3IONS-SCNC: 7 MMOL/L (ref 9–17)
BUN SERPL-MCNC: 23 MG/DL (ref 7–20)
CALCIUM SERPL-MCNC: 7.4 MG/DL (ref 8.3–10.6)
CHLORIDE SERPL-SCNC: 105 MMOL/L (ref 99–110)
CO2 SERPL-SCNC: 27 MMOL/L (ref 21–32)
CREAT SERPL-MCNC: 1.4 MG/DL (ref 0.6–1.2)
GFR, ESTIMATED: 35 ML/MIN/1.73M2
GLUCOSE SERPL-MCNC: 140 MG/DL (ref 74–99)
POTASSIUM SERPL-SCNC: 3.8 MMOL/L (ref 3.5–5.1)
SODIUM SERPL-SCNC: 140 MMOL/L (ref 136–145)

## 2025-06-03 PROCEDURE — 94761 N-INVAS EAR/PLS OXIMETRY MLT: CPT

## 2025-06-03 PROCEDURE — 6370000000 HC RX 637 (ALT 250 FOR IP)

## 2025-06-03 PROCEDURE — 2500000003 HC RX 250 WO HCPCS

## 2025-06-03 PROCEDURE — 80048 BASIC METABOLIC PNL TOTAL CA: CPT

## 2025-06-03 PROCEDURE — 36415 COLL VENOUS BLD VENIPUNCTURE: CPT

## 2025-06-03 PROCEDURE — 99238 HOSP IP/OBS DSCHRG MGMT 30/<: CPT | Performed by: NURSE PRACTITIONER

## 2025-06-03 RX ORDER — PANTOPRAZOLE SODIUM 40 MG/1
40 TABLET, DELAYED RELEASE ORAL DAILY
Qty: 30 TABLET | Refills: 0 | Status: SHIPPED | OUTPATIENT
Start: 2025-06-03

## 2025-06-03 RX ORDER — AMIODARONE HYDROCHLORIDE 200 MG/1
100 TABLET ORAL DAILY
Qty: 30 TABLET | Refills: 1
Start: 2025-06-03

## 2025-06-03 RX ADMIN — Medication 1 TABLET: at 09:35

## 2025-06-03 RX ADMIN — SODIUM CHLORIDE, PRESERVATIVE FREE 10 ML: 5 INJECTION INTRAVENOUS at 09:36

## 2025-06-03 RX ADMIN — Medication 5000 UNITS: at 09:35

## 2025-06-03 RX ADMIN — PANTOPRAZOLE SODIUM 40 MG: 40 TABLET, DELAYED RELEASE ORAL at 07:58

## 2025-06-03 RX ADMIN — CLOBETASOL PROPIONATE: 0.5 OINTMENT TOPICAL at 09:36

## 2025-06-03 RX ADMIN — HYDROCORTISONE: 0.01 CREAM TOPICAL at 09:38

## 2025-06-03 RX ADMIN — FOLIC ACID 1 MG: 1 TABLET ORAL at 09:36

## 2025-06-03 RX ADMIN — ALLOPURINOL 50 MG: 100 TABLET ORAL at 09:35

## 2025-06-03 RX ADMIN — MICONAZOLE NITRATE: 20 CREAM TOPICAL at 09:38

## 2025-06-03 RX ADMIN — LUBIPROSTONE 8 MCG: 8 CAPSULE, GELATIN COATED ORAL at 09:36

## 2025-06-03 RX ADMIN — POTASSIUM CHLORIDE 20 MEQ: 1500 TABLET, EXTENDED RELEASE ORAL at 09:35

## 2025-06-03 RX ADMIN — ASPIRIN 81 MG: 81 TABLET, CHEWABLE ORAL at 09:36

## 2025-06-03 RX ADMIN — DABIGATRAN ETEXILATE MESYLATE 75 MG: 75 CAPSULE ORAL at 09:36

## 2025-06-03 ASSESSMENT — PAIN SCALES - GENERAL
PAINLEVEL_OUTOF10: 0
PAINLEVEL_OUTOF10: 4
PAINLEVEL_OUTOF10: 0

## 2025-06-03 ASSESSMENT — PAIN DESCRIPTION - LOCATION: LOCATION: GENERALIZED

## 2025-06-03 NOTE — PLAN OF CARE
Problem: Discharge Planning  Goal: Discharge to home or other facility with appropriate resources  6/3/2025 0946 by Letty Handy RN  Outcome: Progressing  6/3/2025 0104 by Ephraim Carrasco RN  Outcome: Progressing     Problem: Chronic Conditions and Co-morbidities  Goal: Patient's chronic conditions and co-morbidity symptoms are monitored and maintained or improved  6/3/2025 0946 by Letty Handy RN  Outcome: Progressing  6/3/2025 0104 by Ephraim Carrasco RN  Outcome: Progressing     Problem: Safety - Adult  Goal: Free from fall injury  6/3/2025 0946 by Letty Handy RN  Outcome: Progressing  6/3/2025 0104 by Ephraim Carrasco RN  Outcome: Progressing     Problem: Pain  Goal: Verbalizes/displays adequate comfort level or baseline comfort level  6/3/2025 0946 by Letty Handy RN  Outcome: Progressing  6/3/2025 0104 by Ephraim Carrasco RN  Outcome: Progressing

## 2025-06-03 NOTE — PROGRESS NOTES
Patient is for discharge. All questions answered.  Discharge instructions, education and medications given.  Patient's friend will pick her up around 2PM.

## 2025-06-03 NOTE — DISCHARGE SUMMARY
[facility]  Discharge Summary    Virgie Segura  :  1945  MRN:  [id number]    Admit date:  2025  Discharge date:      Admitting Physician:  Luciano Romano MD    Discharge Diagnoses:     1. Sp Watchman device procedure  2. Sp ablation of atrial fibrillation  3. Hypercoagulable due to atrial fibrillation      Admission Condition:  fair    Discharged Condition:  good    Hospital Course:   Patient with hx of Non valvular PAF, htn, CKD, high fall risk with risk of bleeding and stroke with CHADS-VAS - 5 and HASBLED score 3 - symptomatic atrial fib despite medical therapy - here for atrial fibrillation and watchman as simultaneous procedure to avoid dual procedure. Patient tolerated the procedure well. Observed overnight. Patient groin access site was clean, no hematoma and no tenderness, No bruit. Echo showed no pericardial effusion.  Patient stable for discharge with out patient follow up.    Patient given instructions of continuing anticoagulation and aspirin for 45 days  Then he will get a CHARITO at 45 days and based on the CHARITO results his anticoagulation course will be decided    Patient is doing well following WATCHMAN implant.  Ambulating without any issues      Discharge Medications:      Current Discharge Medication List             Details   pantoprazole (PROTONIX) 40 MG tablet Take 1 tablet by mouth daily  Qty: 30 tablet, Refills: 0                Details   amiodarone (CORDARONE) 200 MG tablet Take 0.5 tablets by mouth daily  Qty: 30 tablet, Refills: 1                Details   PROLIA 60 MG/ML SOSY SC injection       ALPRAZolam (XANAX) 1 MG tablet 1 tablet nightly as needed.      lubiprostone (AMITIZA) 8 MCG CAPS capsule Take 1 capsule by mouth daily      dabigatran (PRADAXA) 75 MG capsule Take 1 capsule by mouth 2 times daily  Qty: 180 capsule, Refills: 3      potassium chloride (KLOR-CON M) 10 MEQ extended release tablet Take 2 tablets by mouth daily  Qty: 180 tablet, Refills: 3      furosemide (LASIX)

## 2025-06-03 NOTE — PROGRESS NOTES
Discharge was discussed with pt bedside. Plan was set for pt to discharge today after 2pm when ride available

## 2025-06-03 NOTE — PROGRESS NOTES
Outpatient Pharmacy Progress Note for Meds-to-Beds    Total number of Prescriptions Filled: 1    Additional Documentation:  Medication(s) were delivered to the patient's room prior to discharge      Thank you for letting us serve your patients.  [address] [address]    Phone: [phone]    Fax: [phone]

## 2025-06-03 NOTE — CARE COORDINATION
Received referral from [facility]. Approved a 1x voucher for 1 medication(s). Faxed voucher to Outpatient Pharmacy.     Patient will need to complete application and provide required documentation for future assistance.     Added patient to the flagged list.

## 2025-06-03 NOTE — PLAN OF CARE
Problem: Discharge Planning  Goal: Discharge to home or other facility with appropriate resources  6/3/2025 0104 by Ephraim Carrasco RN  Outcome: Progressing  6/2/2025 1555 by Jessica Wisdom RN  Outcome: Progressing  Flowsheets (Taken 6/2/2025 1207)  Discharge to home or other facility with appropriate resources:   Identify barriers to discharge with patient and caregiver   Arrange for needed discharge resources and transportation as appropriate   Identify discharge learning needs (meds, wound care, etc)   Arrange for interpreters to assist at discharge as needed   Refer to discharge planning if patient needs post-hospital services based on physician order or complex needs related to functional status, cognitive ability or social support system     Problem: Chronic Conditions and Co-morbidities  Goal: Patient's chronic conditions and co-morbidity symptoms are monitored and maintained or improved  6/3/2025 0104 by Ephraim Carrasco RN  Outcome: Progressing  6/2/2025 1555 by Jessica Wisdom RN  Outcome: Progressing  Flowsheets (Taken 6/2/2025 1207)  Care Plan - Patient's Chronic Conditions and Co-Morbidity Symptoms are Monitored and Maintained or Improved:   Monitor and assess patient's chronic conditions and comorbid symptoms for stability, deterioration, or improvement   Collaborate with multidisciplinary team to address chronic and comorbid conditions and prevent exacerbation or deterioration   Update acute care plan with appropriate goals if chronic or comorbid symptoms are exacerbated and prevent overall improvement and discharge     Problem: Safety - Adult  Goal: Free from fall injury  Outcome: Progressing     Problem: Pain  Goal: Verbalizes/displays adequate comfort level or baseline comfort level  Outcome: Progressing

## 2025-06-04 LAB
ABO/RH: NORMAL
ANTIBODY SCREEN: NEGATIVE
BLOOD BANK DISPENSE STATUS: NORMAL
BLOOD BANK SAMPLE EXPIRATION: NORMAL
BPU ID: NORMAL
COMPONENT: NORMAL
CROSSMATCH RESULT: NORMAL
TRANSFUSION STATUS: NORMAL
UNIT DIVISION: 0

## 2025-06-05 ENCOUNTER — TELEPHONE (OUTPATIENT)
Dept: CARDIOLOGY CLINIC | Age: 80
End: 2025-06-05

## 2025-06-05 NOTE — TELEPHONE ENCOUNTER
Spoke with patient and scheduled office visit follow up with Britni Crystal NP on 6/6/25 @ 115pm.    Patient advised understanding.

## 2025-06-06 ENCOUNTER — OFFICE VISIT (OUTPATIENT)
Age: 80
End: 2025-06-06
Payer: MEDICARE

## 2025-06-06 VITALS
SYSTOLIC BLOOD PRESSURE: 118 MMHG | HEIGHT: 61 IN | DIASTOLIC BLOOD PRESSURE: 70 MMHG | WEIGHT: 137 LBS | BODY MASS INDEX: 25.86 KG/M2 | HEART RATE: 94 BPM

## 2025-06-06 DIAGNOSIS — I48.0 PAF (PAROXYSMAL ATRIAL FIBRILLATION) (HCC): Primary | ICD-10-CM

## 2025-06-06 DIAGNOSIS — R60.0 EDEMA OF BOTH LOWER LEGS DUE TO PERIPHERAL VENOUS INSUFFICIENCY: ICD-10-CM

## 2025-06-06 DIAGNOSIS — I87.2 EDEMA OF BOTH LOWER LEGS DUE TO PERIPHERAL VENOUS INSUFFICIENCY: ICD-10-CM

## 2025-06-06 DIAGNOSIS — I10 ESSENTIAL HYPERTENSION: ICD-10-CM

## 2025-06-06 DIAGNOSIS — Z86.79 S/P ABLATION OF ATRIAL FIBRILLATION: ICD-10-CM

## 2025-06-06 DIAGNOSIS — Z98.890 S/P ABLATION OF ATRIAL FIBRILLATION: ICD-10-CM

## 2025-06-06 DIAGNOSIS — Z95.818 PRESENCE OF WATCHMAN LEFT ATRIAL APPENDAGE CLOSURE DEVICE: ICD-10-CM

## 2025-06-06 PROCEDURE — 3078F DIAST BP <80 MM HG: CPT

## 2025-06-06 PROCEDURE — 99214 OFFICE O/P EST MOD 30 MIN: CPT

## 2025-06-06 PROCEDURE — 3074F SYST BP LT 130 MM HG: CPT

## 2025-06-06 PROCEDURE — 93000 ELECTROCARDIOGRAM COMPLETE: CPT

## 2025-06-06 PROCEDURE — 1124F ACP DISCUSS-NO DSCNMKR DOCD: CPT

## 2025-06-06 NOTE — PATIENT INSTRUCTIONS
Please take your furosemide (Lasix) 40 mg tablet daily for one week for increased swelling.   Keep your previously scheduled follow up with Ros on 6/12/25.   After follow up, expect to decrease back to your regular dose of furosemide three times weekly.

## 2025-06-09 PROBLEM — Z86.79 S/P ABLATION OF ATRIAL FIBRILLATION: Status: ACTIVE | Noted: 2025-06-09

## 2025-06-09 PROBLEM — Z98.890 S/P ABLATION OF ATRIAL FIBRILLATION: Status: ACTIVE | Noted: 2025-06-09

## 2025-06-09 ASSESSMENT — ENCOUNTER SYMPTOMS
COLOR CHANGE: 0
CONSTIPATION: 0
BACK PAIN: 0
EYE PAIN: 0
TROUBLE SWALLOWING: 0
CHEST TIGHTNESS: 0
NAUSEA: 0
ABDOMINAL PAIN: 0
EYES NEGATIVE: 1
VOMITING: 0
BLOOD IN STOOL: 0
DIARRHEA: 0
SHORTNESS OF BREATH: 0
PHOTOPHOBIA: 0
WHEEZING: 0
COUGH: 0
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
## 2025-06-12 ENCOUNTER — OFFICE VISIT (OUTPATIENT)
Age: 80
End: 2025-06-12
Payer: MEDICARE

## 2025-06-12 VITALS
SYSTOLIC BLOOD PRESSURE: 112 MMHG | WEIGHT: 130 LBS | HEART RATE: 94 BPM | DIASTOLIC BLOOD PRESSURE: 62 MMHG | HEIGHT: 61 IN | BODY MASS INDEX: 24.55 KG/M2

## 2025-06-12 DIAGNOSIS — Z98.890 S/P ABLATION OF ATRIAL FIBRILLATION: Primary | ICD-10-CM

## 2025-06-12 DIAGNOSIS — I10 ESSENTIAL HYPERTENSION: ICD-10-CM

## 2025-06-12 DIAGNOSIS — I48.0 PAF (PAROXYSMAL ATRIAL FIBRILLATION) (HCC): ICD-10-CM

## 2025-06-12 DIAGNOSIS — D68.69 HYPERCOAGULABLE STATE DUE TO PAROXYSMAL ATRIAL FIBRILLATION (HCC): ICD-10-CM

## 2025-06-12 DIAGNOSIS — Z86.79 S/P ABLATION OF ATRIAL FIBRILLATION: Primary | ICD-10-CM

## 2025-06-12 DIAGNOSIS — I48.0 HYPERCOAGULABLE STATE DUE TO PAROXYSMAL ATRIAL FIBRILLATION (HCC): ICD-10-CM

## 2025-06-12 DIAGNOSIS — Z95.818 PRESENCE OF WATCHMAN LEFT ATRIAL APPENDAGE CLOSURE DEVICE: ICD-10-CM

## 2025-06-12 PROCEDURE — 1159F MED LIST DOCD IN RCRD: CPT | Performed by: NURSE PRACTITIONER

## 2025-06-12 PROCEDURE — 1124F ACP DISCUSS-NO DSCNMKR DOCD: CPT | Performed by: NURSE PRACTITIONER

## 2025-06-12 PROCEDURE — 3074F SYST BP LT 130 MM HG: CPT | Performed by: NURSE PRACTITIONER

## 2025-06-12 PROCEDURE — 3078F DIAST BP <80 MM HG: CPT | Performed by: NURSE PRACTITIONER

## 2025-06-12 PROCEDURE — 99214 OFFICE O/P EST MOD 30 MIN: CPT | Performed by: NURSE PRACTITIONER

## 2025-06-12 PROCEDURE — 93000 ELECTROCARDIOGRAM COMPLETE: CPT | Performed by: NURSE PRACTITIONER

## 2025-06-12 ASSESSMENT — ENCOUNTER SYMPTOMS
SINUS PRESSURE: 0
COLOR CHANGE: 0
SHORTNESS OF BREATH: 0
BLOOD IN STOOL: 0
ABDOMINAL PAIN: 0
PHOTOPHOBIA: 0
BACK PAIN: 0
COUGH: 0
SINUS PAIN: 0
ABDOMINAL DISTENTION: 0

## 2025-06-12 NOTE — PROGRESS NOTES
Emergency. On 05/02/2025, while resting in her recliner, she experienced normal breathing but felt a constant sensation in her chest. This persisted throughout the day, leading her to seek medical attention at [facility]. She was discharged home later that night. A similar episode occurred on 05/05/2025. She has been on Pradaxa since January 2025 and has not experienced any falls since starting this medication. She expresses concern about the cost of Eliquis, which she finds unaffordable.    She also reports leg issues, which she believes are due to three falls at her sister's place. She has not had time to get any therapy done on either leg. The first fall occurred when she slipped on the driveway after it rained, the second fall happened when she was walking in the house during renovation and her right foot went through the paper while her left foot caught on something, causing her to fall. The third fall occurred when she got up at 5:00 AM to take her sister for a medical appointment, caught her foot in the bedspread, and ran into her nightstand, causing her knee to hit the floor.    MEDICATIONS  Current: Pradaxa       Pastmedical history:   Past Medical History:   Diagnosis Date    Abdominal pain     Anemia     Angioedema 04/07/2014    APC (atrial premature contractions) 02/2012    Infrequent APC's-Dr Bravo    Arrhythmia 04/07/2014    Arthritis     Brain aneurysm      referred to [facility] Dr Mary Kirkland    CKD (chronic kidney disease)     Sees Dr. YOLY Fontanez- \"stage 3\"    Depression with anxiety 2014    Dr. Spencer managing xanax and cymbalta    Diabetes mellitus (HCC)     \"pre- diabetic\"\"no medication yet\" per pt on 4/19/2018    Difficulty swallowing     \"Large Pills\"    Diffuse cystic mastopathy     Fibromyalgia     Rheumatology [facility]-  Dennis Monge\"have fibromyalgia in all 18 trigger points and my hands an feet are swollen all the time- been to [facility] 4 times for

## 2025-06-13 ENCOUNTER — TELEPHONE (OUTPATIENT)
Dept: CARDIOLOGY CLINIC | Age: 80
End: 2025-06-13

## 2025-06-13 DIAGNOSIS — I48.0 PAF (PAROXYSMAL ATRIAL FIBRILLATION) (HCC): Primary | ICD-10-CM
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
## 2025-06-13 NOTE — TELEPHONE ENCOUNTER
Called patient and given reassurance, advised patient to rest and start slow as she did a lot of running around yesterday after her apt. And was her first time out since her procedure. Patient extremely appreciative of all my help and stated she felt better since talking with me.

## 2025-06-13 NOTE — PROGRESS NOTES
MRN: [id number]  Name: Virgie Segura  : 1945    Insurance: Payor: [employer] /  /  /      Phone #: [phone]  Provider: Dylan Schwartz MD     Date of Visit: 2025    Reason for visit: 3 mo Recent Hospitalization Date:    Reason for Hospitalization:  ablation  Last EK25 amio  Type of Device:       Vitals BP HR O2% WT HT ORTHO BP LYING ORTHO BP SITTING ORTHO BP SITTING   Today's Findings           Patients work up- Check List     Testing Last Date Completed Date Expected  ([facility]) Additional Notes    MA to document For provider to complete Either MA or Provider    Carotid Duplex  STAT 1 WK 6 MTH       THIS WK 2 WK 1 YEAR     Cardiac CTA  STAT 1 WK 6 MTH       THIS WK 2 WK 1 YEAR     Cardiac CT Calcium scoring  STAT 1 WK 6 MTH       THIS WK 2 WK 1 YEAR     CTA Chest, Abdomen & Pelvis  STAT 1 WK 6 MTH       THIS WK 2 WK 1 YEAR     CT Chest IV w/ Contrast  STAT 1 WK 6 MTH       THIS WK 2 WK 1 YEAR     CT Chest w/o Contrast  STAT 1 WK 6 MTH       THIS WK 2 WK 1 YEAR     CXR  STAT 1 WK 6 MTH       THIS WK 2 WK 1 YEAR     ECHO  Stress Complete Limited     MRI- Cardiac  STAT 1 WK 6 MTH       THIS WK 2 WK 1 YEAR     MUGA Scan  STAT 1 WK 6 MTH       THIS WK 2 WK 1 YEAR     Nuclear Stress 2020 Lexiscan Cardiolite     PFT  STAT 1 WK 6 MTH       THIS WK 2 WK 1 YEAR     Treadmill Stress Test  STAT 1 WK 6 MTH       THIS WK 2 WK 1 YEAR     Vascular Duplex  Lower: Right Left Bilat       Upper: Right Left Bilat     Other Test Not Listed: ablation    Monitors Last Date Completed Day's Request/Ordered     Holter  Short term 24 hours 48 hours      Long term 3 days 7 days 14 days   Event  3/25 (1-30 days)      Procedures Last Date Performed Procedure Details Date Expected   Additional Notes    ASD Closure        Carotid Angio        Cardioversion        Heart Cath  R L R&L      Peripheral Angio  R L      PFO Closure        PTCA/PCI        CHARITO        CHARITO/Cardioversion        Venogram

## 2025-06-25 ENCOUNTER — TELEPHONE (OUTPATIENT)
Dept: CARDIOLOGY CLINIC | Age: 80
End: 2025-06-25

## 2025-06-25 NOTE — TELEPHONE ENCOUNTER
Spoke with Patient and advised her to use Ice on her thigh area, that she complains of being sore/achy; Advised to apply ice for 20 mins, every hour. Patient states she has been walking a lot, and going out to visit friends/neighbors. Patient has follow up appointment with Ros Conway NP on 7/3/25. Patient advised understanding.

## 2025-06-25 NOTE — TELEPHONE ENCOUNTER
Patent called she is having a lot of   Issues with her upper thigh / leg  Since her ablation and is asking   If this is normal .

## 2025-07-03 ENCOUNTER — CLINICAL SUPPORT (OUTPATIENT)
Age: 80
End: 2025-07-03

## 2025-07-03 ENCOUNTER — OFFICE VISIT (OUTPATIENT)
Age: 80
End: 2025-07-03
Payer: MEDICARE

## 2025-07-03 VITALS
HEIGHT: 61 IN | HEART RATE: 92 BPM | WEIGHT: 132 LBS | SYSTOLIC BLOOD PRESSURE: 126 MMHG | DIASTOLIC BLOOD PRESSURE: 72 MMHG | BODY MASS INDEX: 24.92 KG/M2

## 2025-07-03 DIAGNOSIS — Z95.818 PRESENCE OF WATCHMAN LEFT ATRIAL APPENDAGE CLOSURE DEVICE: Primary | ICD-10-CM

## 2025-07-03 DIAGNOSIS — I10 ESSENTIAL HYPERTENSION: ICD-10-CM

## 2025-07-03 DIAGNOSIS — I48.0 HYPERCOAGULABLE STATE DUE TO PAROXYSMAL ATRIAL FIBRILLATION (HCC): ICD-10-CM

## 2025-07-03 DIAGNOSIS — Z86.79 S/P ABLATION OF ATRIAL FIBRILLATION: ICD-10-CM

## 2025-07-03 DIAGNOSIS — Z98.890 S/P ABLATION OF ATRIAL FIBRILLATION: ICD-10-CM

## 2025-07-03 DIAGNOSIS — D68.69 HYPERCOAGULABLE STATE DUE TO PAROXYSMAL ATRIAL FIBRILLATION (HCC): ICD-10-CM

## 2025-07-03 PROCEDURE — 1159F MED LIST DOCD IN RCRD: CPT | Performed by: NURSE PRACTITIONER

## 2025-07-03 PROCEDURE — 3074F SYST BP LT 130 MM HG: CPT | Performed by: NURSE PRACTITIONER

## 2025-07-03 PROCEDURE — 1124F ACP DISCUSS-NO DSCNMKR DOCD: CPT | Performed by: NURSE PRACTITIONER

## 2025-07-03 PROCEDURE — 99214 OFFICE O/P EST MOD 30 MIN: CPT | Performed by: NURSE PRACTITIONER

## 2025-07-03 PROCEDURE — 93000 ELECTROCARDIOGRAM COMPLETE: CPT | Performed by: NURSE PRACTITIONER

## 2025-07-03 PROCEDURE — 3078F DIAST BP <80 MM HG: CPT | Performed by: NURSE PRACTITIONER

## 2025-07-03 RX ORDER — DILTIAZEM HYDROCHLORIDE 120 MG/1
120 CAPSULE, COATED, EXTENDED RELEASE ORAL DAILY
Qty: 90 CAPSULE | Refills: 1 | Status: SHIPPED | OUTPATIENT
Start: 2025-07-03

## 2025-07-03 ASSESSMENT — ENCOUNTER SYMPTOMS
COUGH: 0
COLOR CHANGE: 0
PHOTOPHOBIA: 0
BACK PAIN: 0
SINUS PAIN: 0
SHORTNESS OF BREATH: 0
BLOOD IN STOOL: 0
SINUS PRESSURE: 0
ABDOMINAL DISTENTION: 0
ABDOMINAL PAIN: 0

## 2025-07-03 NOTE — PROGRESS NOTES
Patient here in office and educated on 07/03/25, scheduled for Transesophageal Echocardiogram on 07/22/25 @ 1230, with arrival @ 1130, @ [facility]; consents signed. Copy of orders given for labs due 07/03/25 at [facility]. Instructions given to patient to :NPO after midnight including water the night before procedure. May take rest of morning medications day of procedure except the following;   Please continue to take Pradaxa as directed. If you are taking Furosemide (Lasix) please hold the morning of the procedure. Patient voiced understanding. Copies of consent & info scanned in chart.
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
## 2025-07-07 ENCOUNTER — HOSPITAL ENCOUNTER (OUTPATIENT)
Age: 80
Discharge: HOME | End: 2025-07-07
Payer: MEDICARE

## 2025-07-07 DIAGNOSIS — I48.0 PAF (PAROXYSMAL ATRIAL FIBRILLATION) (HCC): ICD-10-CM

## 2025-07-07 LAB
ANION GAP SERPL CALCULATED.3IONS-SCNC: 10 MMOL/L (ref 9–17)
BUN SERPL-MCNC: 26 MG/DL (ref 7–20)
CALCIUM SERPL-MCNC: 10 MG/DL (ref 8.3–10.6)
CHLORIDE SERPL-SCNC: 103 MMOL/L (ref 99–110)
CO2 SERPL-SCNC: 29 MMOL/L (ref 21–32)
CREAT SERPL-MCNC: 1.7 MG/DL (ref 0.6–1.2)
ERYTHROCYTE [DISTWIDTH] IN BLOOD BY AUTOMATED COUNT: 14.5 % (ref 11.7–14.9)
GFR, ESTIMATED: 29 ML/MIN/1.73M2
GLUCOSE SERPL-MCNC: 90 MG/DL (ref 74–99)
HCT VFR BLD AUTO: 39.1 % (ref 37–47)
HGB BLD-MCNC: 11.7 G/DL (ref 12.5–16)
INR PPP: 1.3
MAGNESIUM SERPL-MCNC: 2.7 MG/DL (ref 1.8–2.4)
MCH RBC QN AUTO: 28.1 PG (ref 27–31)
MCHC RBC AUTO-ENTMCNC: 29.9 G/DL (ref 32–36)
MCV RBC AUTO: 93.8 FL (ref 78–100)
PARTIAL THROMBOPLASTIN TIME: 45.6 SEC (ref 25.1–37.1)
PHOSPHATE SERPL-MCNC: 3.8 MG/DL (ref 2.5–4.9)
PLATELET # BLD AUTO: 210 K/UL (ref 140–440)
PMV BLD AUTO: 11.2 FL (ref 7.5–11.1)
POTASSIUM SERPL-SCNC: 4.9 MMOL/L (ref 3.5–5.1)
PROTHROMBIN TIME: 16.2 SEC (ref 11.7–14.5)
RBC # BLD AUTO: 4.17 M/UL (ref 4.2–5.4)
SODIUM SERPL-SCNC: 141 MMOL/L (ref 136–145)
WBC OTHER # BLD: 7.2 K/UL (ref 4–10.5)

## 2025-07-07 PROCEDURE — 84100 ASSAY OF PHOSPHORUS: CPT

## 2025-07-07 PROCEDURE — 85730 THROMBOPLASTIN TIME PARTIAL: CPT

## 2025-07-07 PROCEDURE — 85027 COMPLETE CBC AUTOMATED: CPT

## 2025-07-07 PROCEDURE — 83735 ASSAY OF MAGNESIUM: CPT

## 2025-07-07 PROCEDURE — 85610 PROTHROMBIN TIME: CPT

## 2025-07-07 PROCEDURE — 80048 BASIC METABOLIC PNL TOTAL CA: CPT

## 2025-07-15 ENCOUNTER — TELEPHONE (OUTPATIENT)
Dept: CARDIOLOGY CLINIC | Age: 80
End: 2025-07-15

## 2025-07-15 NOTE — TELEPHONE ENCOUNTER
Called patient, she stated she had a few seconds of a-fib after discussing her will. Advised patient of the 3 month blanking period after an ablation. Advised to call back if it continues and last longer. Patient stated understanding. No other c/o noted. Patient appreciative of call.

## 2025-07-22 ENCOUNTER — HOSPITAL ENCOUNTER (OUTPATIENT)
Dept: NON INVASIVE DIAGNOSTICS | Age: 80
Discharge: HOME | End: 2025-07-24
Attending: INTERNAL MEDICINE
Payer: MEDICARE

## 2025-07-22 VITALS
SYSTOLIC BLOOD PRESSURE: 150 MMHG | BODY MASS INDEX: 24.92 KG/M2 | RESPIRATION RATE: 21 BRPM | HEART RATE: 78 BPM | DIASTOLIC BLOOD PRESSURE: 64 MMHG | WEIGHT: 132 LBS | OXYGEN SATURATION: 100 % | HEIGHT: 61 IN

## 2025-07-22 DIAGNOSIS — I48.0 PAF (PAROXYSMAL ATRIAL FIBRILLATION) (HCC): ICD-10-CM

## 2025-07-22 LAB — ECHO BSA: 1.61 M2

## 2025-07-22 PROCEDURE — 6370000000 HC RX 637 (ALT 250 FOR IP): Performed by: INTERNAL MEDICINE

## 2025-07-22 PROCEDURE — 93313 ECHO TRANSESOPHAGEAL: CPT

## 2025-07-22 PROCEDURE — 6360000002 HC RX W HCPCS: Performed by: INTERNAL MEDICINE

## 2025-07-22 RX ORDER — MIDAZOLAM HYDROCHLORIDE 1 MG/ML
INJECTION, SOLUTION INTRAMUSCULAR; INTRAVENOUS PRN
Status: COMPLETED | OUTPATIENT
Start: 2025-07-22 | End: 2025-07-22

## 2025-07-22 RX ORDER — LIDOCAINE HYDROCHLORIDE 20 MG/ML
SOLUTION OROPHARYNGEAL PRN
Status: COMPLETED | OUTPATIENT
Start: 2025-07-22 | End: 2025-07-22

## 2025-07-22 RX ADMIN — BENZOCAINE 1 SPRAY: 200 SPRAY DENTAL; ORAL; PERIODONTAL at 12:50

## 2025-07-22 RX ADMIN — MIDAZOLAM 0.5 MG: 1 INJECTION INTRAMUSCULAR; INTRAVENOUS at 12:52

## 2025-07-22 RX ADMIN — MIDAZOLAM 1 MG: 1 INJECTION INTRAMUSCULAR; INTRAVENOUS at 13:01

## 2025-07-22 RX ADMIN — MIDAZOLAM 0.5 MG: 1 INJECTION INTRAMUSCULAR; INTRAVENOUS at 12:49

## 2025-07-22 RX ADMIN — MIDAZOLAM 1 MG: 1 INJECTION INTRAMUSCULAR; INTRAVENOUS at 12:48

## 2025-07-22 RX ADMIN — LIDOCAINE HYDROCHLORIDE 15 ML: 20 SOLUTION ORAL at 12:46

## 2025-07-22 RX ADMIN — MIDAZOLAM 1 MG: 1 INJECTION INTRAMUSCULAR; INTRAVENOUS at 12:58

## 2025-07-22 ASSESSMENT — ENCOUNTER SYMPTOMS
SINUS PRESSURE: 0
COUGH: 0
SHORTNESS OF BREATH: 0
PHOTOPHOBIA: 0
ABDOMINAL DISTENTION: 0
BACK PAIN: 0
SINUS PAIN: 0
BLOOD IN STOOL: 0
ABDOMINAL PAIN: 0
COLOR CHANGE: 0

## 2025-07-22 NOTE — PROCEDURES
PROCEDURE NOTE  Date: 7/22/2025   Name: Virgie Segura  YOB: 1945    Procedures    Procedure     Conscious sedation for CHARITO     ASA 3  Mallampati 3     After obtaining form consent patient was brought to the holding area and underwent conscious sedation with Versed , fentanyl, remained hemodynamically stable  Patient is hemodynamic status, neuro status was evaluated before conscious sedation    Patient's blood pressure, heart rate, pulse ox, neuro status was monitored for 30 minutes post procedure and she remained stable    The details of the conscious sedation is in the flowsheet in epic

## 2025-07-22 NOTE — DISCHARGE INSTRUCTIONS
Return Monday 7/28 at 9:15 to register for CHARITO.  Appointment is scheduled for 10:15 with anesthesia.
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
## 2025-07-25 ENCOUNTER — ANESTHESIA EVENT (OUTPATIENT)
Dept: NON INVASIVE DIAGNOSTICS | Age: 80
End: 2025-07-25
Payer: MEDICARE

## 2025-07-25 NOTE — ANESTHESIA PRE PROCEDURE
Department of Anesthesiology  Preprocedure Note       Name:  Virgie Segura   Age:  80 y.o.  :  1945                                          MRN:  [id number]         Date:  2025      Surgeon: * Surgery not found *    Procedure:     Medications prior to admission:   Prior to Admission medications    Medication Sig Start Date End Date Taking? Authorizing Provider   dilTIAZem (CARDIZEM CD) 120 MG extended release capsule Take 1 capsule by mouth daily 7/3/25   Ros Conway APRN - CNP   pantoprazole (PROTONIX) 40 MG tablet Take 1 tablet by mouth daily 6/3/25   Ros Conway APRN - CNP   amiodarone (CORDARONE) 200 MG tablet Take 0.5 tablets by mouth daily 6/3/25   Ros Conway APRN - CNP   PROLIA 60 MG/ML SOSY SC injection  25   Roney Rosen MD   ALPRAZolam (XANAX) 1 MG tablet 1 tablet nightly as needed.  Patient taking differently: 1 tablet nightly as needed. 1mg a night and .25 as needed during the day 25   Roney Rosen MD   lubiprostone (AMITIZA) 8 MCG CAPS capsule Take 1 capsule by mouth daily    Roney Rosen MD   dabigatran (PRADAXA) 75 MG capsule Take 1 capsule by mouth 2 times daily 25  Dylan Schwartz MD   potassium chloride (KLOR-CON M) 10 MEQ extended release tablet Take 2 tablets by mouth daily 2/3/25   Delroy Fontanez MD   furosemide (LASIX) 40 MG tablet TAKE ONE (1) TABLET BY MOUTH AS DIRECTED ON MON-WED-FRI 2/3/25   Delroy Fontanez MD   DULoxetine (CYMBALTA) 60 MG extended release capsule Take 1 capsule by mouth daily 24   Yusef Tillman MD   nortriptyline (PAMELOR) 10 MG capsule ONE CAPSULE BY MOUTH ONCE DAILY 24   Yusef Tillman MD   gabapentin (NEURONTIN) 300 MG capsule Take 1 capsule by mouth nightly. 24  Yusef Tillman MD   ESTRADIOL [facility] 0.2 g vaginally 2x weekly    Roney Rosen MD   clotrimazole (LOTRIMIN) 1 % cream PLEASE SEE ATTACHED FOR DETAILED DIRECTIONS 24   Provider,

## 2025-07-28 ENCOUNTER — HOSPITAL ENCOUNTER (OUTPATIENT)
Dept: NON INVASIVE DIAGNOSTICS | Age: 80
Discharge: HOME | End: 2025-07-30
Payer: MEDICARE

## 2025-07-28 ENCOUNTER — ANESTHESIA (OUTPATIENT)
Dept: NON INVASIVE DIAGNOSTICS | Age: 80
End: 2025-07-28
Payer: MEDICARE

## 2025-07-28 VITALS
HEART RATE: 71 BPM | RESPIRATION RATE: 27 BRPM | BODY MASS INDEX: 24.92 KG/M2 | WEIGHT: 132 LBS | DIASTOLIC BLOOD PRESSURE: 71 MMHG | SYSTOLIC BLOOD PRESSURE: 140 MMHG | HEIGHT: 61 IN | OXYGEN SATURATION: 100 %

## 2025-07-28 DIAGNOSIS — I48.91 A-FIB (HCC): ICD-10-CM

## 2025-07-28 LAB — ECHO BSA: 1.61 M2

## 2025-07-28 PROCEDURE — 93312 ECHO TRANSESOPHAGEAL: CPT

## 2025-07-28 PROCEDURE — 6360000002 HC RX W HCPCS

## 2025-07-28 PROCEDURE — 7100000010 HC PHASE II RECOVERY - FIRST 15 MIN: Performed by: INTERNAL MEDICINE

## 2025-07-28 PROCEDURE — 7100000011 HC PHASE II RECOVERY - ADDTL 15 MIN: Performed by: INTERNAL MEDICINE

## 2025-07-28 PROCEDURE — 3700000000 HC ANESTHESIA ATTENDED CARE: Performed by: INTERNAL MEDICINE

## 2025-07-28 RX ORDER — PROPOFOL 10 MG/ML
INJECTION, EMULSION INTRAVENOUS
Status: DISCONTINUED | OUTPATIENT
Start: 2025-07-28 | End: 2025-07-28 | Stop reason: SDUPTHER

## 2025-07-28 RX ORDER — LIDOCAINE HYDROCHLORIDE 20 MG/ML
INJECTION, SOLUTION EPIDURAL; INFILTRATION; INTRACAUDAL; PERINEURAL
Status: DISCONTINUED | OUTPATIENT
Start: 2025-07-28 | End: 2025-07-28 | Stop reason: SDUPTHER

## 2025-07-28 RX ADMIN — PROPOFOL 50 MG: 10 INJECTION, EMULSION INTRAVENOUS at 09:56

## 2025-07-28 RX ADMIN — PROPOFOL 20 MG: 10 INJECTION, EMULSION INTRAVENOUS at 09:57

## 2025-07-28 RX ADMIN — LIDOCAINE HYDROCHLORIDE 100 MG: 20 INJECTION, SOLUTION EPIDURAL; INFILTRATION; INTRACAUDAL; PERINEURAL at 09:56

## 2025-07-28 ASSESSMENT — ENCOUNTER SYMPTOMS
ABDOMINAL DISTENTION: 0
PHOTOPHOBIA: 0
ABDOMINAL PAIN: 0
BACK PAIN: 0
COUGH: 0
SHORTNESS OF BREATH: 0
SINUS PRESSURE: 0
COLOR CHANGE: 0
BLOOD IN STOOL: 0
SINUS PAIN: 0

## 2025-07-28 NOTE — H&P
Dr. Talamantes    Teeth missing     Upper And Lower    Thigh pain     UTI (urinary tract infection) 03/2018    Vitamin D deficiency     Wears glasses     Wears partial dentures     Upper       Surgical history :   Past Surgical History:   Procedure Laterality Date    BLADDER SURGERY  2000, 2008    Dr. CHOWDHURY- dilatation     BREAST BIOPSY Left 1993 And 1995    Benign    COLONOSCOPY  2011    Dr. Gibbs [facility] in [place];Grade 1 internal hemorrhoids    DENTAL SURGERY      Teeth Extracted In Past    ENDOSCOPY, COLON, DIAGNOSTIC  In Past    EP DEVICE PROCEDURE N/A 6/2/2025    Ablation A-fib w complete ep study @ 0700 performed by Luciano Romano MD at [facility] CARDIAC CATH LAB    EP DEVICE PROCEDURE N/A 6/2/2025    Watchman shelley closure device performed by Luciano Romano MD at [facility] CARDIAC CATH LAB    EYE SURGERY Left 11/2017    Cataract With Lens Implant    HYSTERECTOMY, TOTAL ABDOMINAL (CERVIX REMOVED)  01/2014    \"took everything    LEFT ATRIAL APPENDAGE OCCLUDER DEVICE  06/02/2025    Watchman #20 by Dr Romano    OTHER SURGICAL HISTORY  06/02/2025    A-Fib Ablation by Dr Romano    SHOULDER ARTHROSCOPY Right 11/2017    rotator cuff repair, SAd, distal clavicle excision, extensive debridement    SKIN CANCER EXCISION  In Past    Nose    TONSILLECTOMY  1960's       Family history:   Family History   Problem Relation Age of Onset    Arthritis Mother     Hearing Loss Mother     Heart Disease Mother         \"Aneurysm In Heart\"    Stroke Father     Heart Attack Father     Heart Disease Father         Heart Attack    Diabetes Father     Other Brother         \"Colon Polyps\"    Heart Disease Brother         \"Heart Surgery\"    Mental Illness Son     Breast Cancer Maternal Grandmother     Colon Cancer Maternal Uncle     Breast Cancer Maternal Aunt        Social history :  reports that she has never smoked. She has never used smokeless tobacco. She reports that she does not drink alcohol and does not use

## 2025-07-28 NOTE — ANESTHESIA POSTPROCEDURE EVALUATION
Department of Anesthesiology  Postprocedure Note    Patient: Virgie Segura  MRN: [id number]  YOB: 1945  Date of evaluation: 7/28/2025    Procedure Summary       Date: 07/28/25 Room / Location: [facility] Noninvasive Cardiology    Anesthesia Start: 0952 Anesthesia Stop: 1003    Procedure: CHARITO (PRN CONTRAST/BUBBLE/3D) Diagnosis: A-fib (HCC)    Scheduled Providers: Luciano Romano MD Responsible Provider: Harsha Freed MD    Anesthesia Type: MAC ASA Status: 3            Anesthesia Type: MAC    Norman Phase I: Norman Score: 10    Norman Phase II:      Anesthesia Post Evaluation    Patient location during evaluation: bedside  Patient participation: complete - patient participated  Level of consciousness: sleepy but conscious  Airway patency: patent  Nausea & Vomiting: no nausea and no vomiting  Cardiovascular status: hemodynamically stable  Respiratory status: acceptable, spontaneous ventilation and nasal cannula  Hydration status: stable  Pain management: adequate    No notable events documented.

## 2025-08-19 ENCOUNTER — OFFICE VISIT (OUTPATIENT)
Dept: CARDIOLOGY CLINIC | Age: 80
End: 2025-08-19
Payer: MEDICARE

## 2025-08-19 VITALS
DIASTOLIC BLOOD PRESSURE: 72 MMHG | WEIGHT: 132 LBS | SYSTOLIC BLOOD PRESSURE: 124 MMHG | BODY MASS INDEX: 24.92 KG/M2 | HEART RATE: 86 BPM | HEIGHT: 61 IN

## 2025-08-19 DIAGNOSIS — I38 VALVULAR HEART DISEASE: ICD-10-CM

## 2025-08-19 DIAGNOSIS — I10 ESSENTIAL HYPERTENSION: ICD-10-CM

## 2025-08-19 DIAGNOSIS — I48.0 PAROXYSMAL ATRIAL FIBRILLATION (HCC): Primary | ICD-10-CM

## 2025-08-19 DIAGNOSIS — R00.2 PALPITATIONS: ICD-10-CM

## 2025-08-19 DIAGNOSIS — R42 DIZZINESS AND GIDDINESS: ICD-10-CM

## 2025-08-19 PROCEDURE — 3078F DIAST BP <80 MM HG: CPT | Performed by: INTERNAL MEDICINE

## 2025-08-19 PROCEDURE — 1124F ACP DISCUSS-NO DSCNMKR DOCD: CPT | Performed by: INTERNAL MEDICINE

## 2025-08-19 PROCEDURE — 1159F MED LIST DOCD IN RCRD: CPT | Performed by: INTERNAL MEDICINE

## 2025-08-19 PROCEDURE — 99214 OFFICE O/P EST MOD 30 MIN: CPT | Performed by: INTERNAL MEDICINE

## 2025-08-19 PROCEDURE — 3074F SYST BP LT 130 MM HG: CPT | Performed by: INTERNAL MEDICINE

## (undated) DEVICE — CATHETER ULTRASOUND NUVISION ICE OD10 FR

## (undated) DEVICE — CATHETER EP 6FR L115CM 2-8-2MM SPC TIP 2MM 10 ELECTRD CSD

## (undated) DEVICE — PERCLOSE™ PROSTYLE™ SUTURE-MEDIATED CLOSURE AND REPAIR SYSTEM: Brand: PERCLOSE™ PROSTYLE™

## (undated) DEVICE — INTRODUCER SHTH 8.5FR L63CM 8.5FR DIL GWIRE L145CM

## (undated) DEVICE — 1 X VERSACROSS LARGE ACCESS TRANSSEPTAL DILATOR (INCLUDING 1 X J-TIP MECHANICAL GUIDEWIRE); 1 X VERSACROSS RF WIRE (INCLUDING 1 X CONNECTOR CABLE (SINGLE USE)); 1 X DISPERSIVE ELECTRODE: Brand: VERSACROSS LARGE ACCESS SOLUTION

## (undated) DEVICE — CATHETER ABLATION QDOT MICRO DF CURVE BIDIRECTIONAL THERMOCOUPLE

## (undated) DEVICE — CATHETER ANGIO 6FR L110CM ID0.056IN VENT PIG CRV ROBUST

## (undated) DEVICE — CHECK-FLO PERFORMER INTRODUCER: Brand: CHECK-FLO

## (undated) DEVICE — SHEATH INTRO 9FR L12CM GWIRE L150CM DIA0.038IN STD HEMSTAS

## (undated) DEVICE — INTRODUCER SHTH 8FR L12CM DIA0.038IN STD HEMSTAS CLOSE TOL

## (undated) DEVICE — ACCESS SHEATH WITH DILATOR: Brand: WATCHMAN FXD CURVE™ ACCESS SYSTEM

## (undated) DEVICE — CHECK-FLO PERFORMER INTRODUCER: Brand: PERFORMER

## (undated) DEVICE — Device

## (undated) DEVICE — CATHETER US GE COMPATIBILITY SOUNDSTAR ECO 10FR

## (undated) DEVICE — CATHETER EP 6FR L110CM 2-5-2MM SPC 4 ELECTRD A CRV NYL

## (undated) DEVICE — PATCH REF EXT FOR CARTO 3 SYS (EA = 6 PACKS)

## (undated) DEVICE — CATHETER MAP 7FR L115CM 2-6-2 SPC D CRV 22 ELECTRD PENTARAY

## (undated) DEVICE — SHEATH INTRO 7FR L12CM GWIRE L150CM DIA0.038IN STD HEMSTAS

## (undated) DEVICE — INTRODUCER SHTH 11FR CANN L11CM DIL TIP 45MM YEL TUNGSTEN

## (undated) DEVICE — INTRODUCER SHTH 6FR L12CM DIA0.038IN STD HEMSTAS CLOSE TOL

## (undated) DEVICE — TUBING PMP FOR CARTO SYS SMARTABLATE

## (undated) DEVICE — PERCUTANEOUS ENTRY THINWALL NEEDLE  ONE-PART: Brand: COOK